# Patient Record
Sex: MALE | Race: WHITE | Employment: FULL TIME | URBAN - METROPOLITAN AREA
[De-identification: names, ages, dates, MRNs, and addresses within clinical notes are randomized per-mention and may not be internally consistent; named-entity substitution may affect disease eponyms.]

---

## 2019-04-23 ENCOUNTER — HOSPITAL ENCOUNTER (EMERGENCY)
Age: 44
Discharge: HOME | End: 2019-04-23

## 2019-04-23 ENCOUNTER — APPOINTMENT (OUTPATIENT)
Dept: GENERAL RADIOLOGY | Age: 44
End: 2019-04-23

## 2019-04-23 VITALS
RESPIRATION RATE: 16 BRPM | HEIGHT: 68 IN | DIASTOLIC BLOOD PRESSURE: 83 MMHG | TEMPERATURE: 99.5 F | SYSTOLIC BLOOD PRESSURE: 125 MMHG | BODY MASS INDEX: 20.18 KG/M2 | OXYGEN SATURATION: 94 % | WEIGHT: 133.13 LBS | HEART RATE: 91 BPM

## 2019-04-23 DIAGNOSIS — J18.9 PNEUMONIA DUE TO ORGANISM: Primary | ICD-10-CM

## 2019-04-23 LAB
ALBUMIN SERPL-MCNC: 4.8 G/DL (ref 3.5–5.2)
ALP BLD-CCNC: 107 U/L (ref 40–129)
ALT SERPL-CCNC: 15 U/L (ref 0–40)
ANION GAP SERPL CALCULATED.3IONS-SCNC: 11 MMOL/L (ref 7–16)
AST SERPL-CCNC: 22 U/L (ref 0–39)
BASOPHILS ABSOLUTE: 0.1 E9/L (ref 0–0.2)
BASOPHILS RELATIVE PERCENT: 0.9 % (ref 0–2)
BILIRUB SERPL-MCNC: 0.4 MG/DL (ref 0–1.2)
BUN BLDV-MCNC: 11 MG/DL (ref 6–20)
CALCIUM SERPL-MCNC: 9.3 MG/DL (ref 8.6–10.2)
CHLORIDE BLD-SCNC: 100 MMOL/L (ref 98–107)
CO2: 28 MMOL/L (ref 22–29)
CREAT SERPL-MCNC: 0.9 MG/DL (ref 0.7–1.2)
EOSINOPHILS ABSOLUTE: 0.1 E9/L (ref 0.05–0.5)
EOSINOPHILS RELATIVE PERCENT: 0.9 % (ref 0–6)
GFR AFRICAN AMERICAN: >60
GFR NON-AFRICAN AMERICAN: >60 ML/MIN/1.73
GLUCOSE BLD-MCNC: 105 MG/DL (ref 74–99)
HCT VFR BLD CALC: 43.3 % (ref 37–54)
HEMOGLOBIN: 14.6 G/DL (ref 12.5–16.5)
LACTIC ACID: 0.8 MMOL/L (ref 0.5–2.2)
LYMPHOCYTES ABSOLUTE: 0.99 E9/L (ref 1.5–4)
LYMPHOCYTES RELATIVE PERCENT: 8.7 % (ref 20–42)
MCH RBC QN AUTO: 31.7 PG (ref 26–35)
MCHC RBC AUTO-ENTMCNC: 33.7 % (ref 32–34.5)
MCV RBC AUTO: 94.1 FL (ref 80–99.9)
MONOCYTES ABSOLUTE: 0.44 E9/L (ref 0.1–0.95)
MONOCYTES RELATIVE PERCENT: 3.5 % (ref 2–12)
NEUTROPHILS ABSOLUTE: 9.46 E9/L (ref 1.8–7.3)
NEUTROPHILS RELATIVE PERCENT: 86.1 % (ref 43–80)
PDW BLD-RTO: 13.2 FL (ref 11.5–15)
PLATELET # BLD: 191 E9/L (ref 130–450)
PMV BLD AUTO: 11.1 FL (ref 7–12)
POTASSIUM SERPL-SCNC: 4.4 MMOL/L (ref 3.5–5)
RBC # BLD: 4.6 E12/L (ref 3.8–5.8)
SODIUM BLD-SCNC: 139 MMOL/L (ref 132–146)
TOTAL PROTEIN: 8.4 G/DL (ref 6.4–8.3)
TROPONIN: <0.01 NG/ML (ref 0–0.03)
WBC # BLD: 11 E9/L (ref 4.5–11.5)

## 2019-04-23 PROCEDURE — 80053 COMPREHEN METABOLIC PANEL: CPT

## 2019-04-23 PROCEDURE — 84484 ASSAY OF TROPONIN QUANT: CPT

## 2019-04-23 PROCEDURE — 96365 THER/PROPH/DIAG IV INF INIT: CPT

## 2019-04-23 PROCEDURE — 99284 EMERGENCY DEPT VISIT MOD MDM: CPT

## 2019-04-23 PROCEDURE — 87040 BLOOD CULTURE FOR BACTERIA: CPT

## 2019-04-23 PROCEDURE — 6370000000 HC RX 637 (ALT 250 FOR IP): Performed by: PHYSICIAN ASSISTANT

## 2019-04-23 PROCEDURE — 83605 ASSAY OF LACTIC ACID: CPT

## 2019-04-23 PROCEDURE — 2580000003 HC RX 258: Performed by: PHYSICIAN ASSISTANT

## 2019-04-23 PROCEDURE — 36415 COLL VENOUS BLD VENIPUNCTURE: CPT

## 2019-04-23 PROCEDURE — 71045 X-RAY EXAM CHEST 1 VIEW: CPT

## 2019-04-23 PROCEDURE — 85025 COMPLETE CBC W/AUTO DIFF WBC: CPT

## 2019-04-23 PROCEDURE — 96366 THER/PROPH/DIAG IV INF ADDON: CPT

## 2019-04-23 PROCEDURE — 6360000002 HC RX W HCPCS: Performed by: PHYSICIAN ASSISTANT

## 2019-04-23 PROCEDURE — 93005 ELECTROCARDIOGRAM TRACING: CPT

## 2019-04-23 PROCEDURE — 96375 TX/PRO/DX INJ NEW DRUG ADDON: CPT

## 2019-04-23 RX ORDER — BENZONATATE 100 MG/1
100 CAPSULE ORAL 3 TIMES DAILY PRN
Qty: 21 CAPSULE | Refills: 0 | Status: SHIPPED | OUTPATIENT
Start: 2019-04-23 | End: 2019-04-30

## 2019-04-23 RX ORDER — CEPHALEXIN 500 MG/1
500 CAPSULE ORAL 3 TIMES DAILY
Qty: 21 CAPSULE | Refills: 0 | Status: SHIPPED | OUTPATIENT
Start: 2019-04-23 | End: 2019-04-30

## 2019-04-23 RX ORDER — 0.9 % SODIUM CHLORIDE 0.9 %
2000 INTRAVENOUS SOLUTION INTRAVENOUS ONCE
Status: COMPLETED | OUTPATIENT
Start: 2019-04-23 | End: 2019-04-23

## 2019-04-23 RX ORDER — ACETAMINOPHEN 500 MG
1000 TABLET ORAL EVERY 6 HOURS PRN
Qty: 120 TABLET | Refills: 3 | Status: SHIPPED | OUTPATIENT
Start: 2019-04-23 | End: 2019-04-30

## 2019-04-23 RX ORDER — IBUPROFEN 800 MG/1
800 TABLET ORAL EVERY 8 HOURS PRN
Qty: 21 TABLET | Refills: 3 | Status: SHIPPED | OUTPATIENT
Start: 2019-04-23 | End: 2019-04-30

## 2019-04-23 RX ORDER — GUAIFENESIN AND DEXTROMETHORPHAN HYDROBROMIDE 1200; 60 MG/1; MG/1
1 TABLET, EXTENDED RELEASE ORAL EVERY 12 HOURS PRN
Qty: 28 TABLET | Refills: 0 | Status: SHIPPED | OUTPATIENT
Start: 2019-04-23 | End: 2020-05-28

## 2019-04-23 RX ORDER — AZITHROMYCIN 500 MG/1
500 TABLET, FILM COATED ORAL DAILY
Qty: 7 TABLET | Refills: 0 | Status: SHIPPED | OUTPATIENT
Start: 2019-04-23 | End: 2019-04-30

## 2019-04-23 RX ORDER — ACETAMINOPHEN 500 MG
1000 TABLET ORAL ONCE
Status: COMPLETED | OUTPATIENT
Start: 2019-04-23 | End: 2019-04-23

## 2019-04-23 RX ADMIN — ACETAMINOPHEN 1000 MG: 500 TABLET, FILM COATED ORAL at 20:24

## 2019-04-23 RX ADMIN — WATER 1 G: 1 INJECTION INTRAMUSCULAR; INTRAVENOUS; SUBCUTANEOUS at 20:22

## 2019-04-23 RX ADMIN — SODIUM CHLORIDE 2000 ML: 9 INJECTION, SOLUTION INTRAVENOUS at 20:20

## 2019-04-23 RX ADMIN — AZITHROMYCIN MONOHYDRATE 500 MG: 500 INJECTION, POWDER, LYOPHILIZED, FOR SOLUTION INTRAVENOUS at 20:27

## 2019-04-23 ASSESSMENT — PAIN - FUNCTIONAL ASSESSMENT: PAIN_FUNCTIONAL_ASSESSMENT: PREVENTS OR INTERFERES SOME ACTIVE ACTIVITIES AND ADLS

## 2019-04-23 ASSESSMENT — PAIN DESCRIPTION - PAIN TYPE: TYPE: ACUTE PAIN

## 2019-04-23 ASSESSMENT — PAIN DESCRIPTION - ORIENTATION: ORIENTATION: RIGHT

## 2019-04-23 ASSESSMENT — PAIN DESCRIPTION - LOCATION: LOCATION: CHEST;RIB CAGE

## 2019-04-23 ASSESSMENT — PAIN SCALES - GENERAL
PAINLEVEL_OUTOF10: 6
PAINLEVEL_OUTOF10: 5
PAINLEVEL_OUTOF10: 10

## 2019-04-23 ASSESSMENT — PAIN DESCRIPTION - FREQUENCY: FREQUENCY: INTERMITTENT

## 2019-04-23 ASSESSMENT — PAIN DESCRIPTION - DESCRIPTORS: DESCRIPTORS: DISCOMFORT

## 2019-04-23 NOTE — ED PROVIDER NOTES
[facility]     Department of Emergency Medicine   ED  Provider Note  Admit Date/RoomTime: 4/23/2019  7:17 PM  ED Room: 24/24   Chief Complaint   Cough (cough sore throat diff breathing today pain rt chest area)    History of Present Illness   Source of history provided by:  patient. History/Exam Limitations: none. Dante Ryan is a 38 y.o. old male who has a past medical history of: History reviewed. No pertinent past medical history. presents to the emergency department by private vehicle, with complaints of gradual onset, still present, constant right sided pleuritic chest pain which began 2 hour(s) prior to arrival.  The symptoms are associated with chills, fatigue, night sweats and cough all of which started yesterday. He also c/o SOB due to not being alexander to take a deep breath in. He denies abdominal pain, calf pain, dizziness, leg swelling, palpitations or syncope. He has prior history of no history of pneumonia or bronchitis in the past.  Since onset the symptoms have been persistent and worsening and moderate to severe in severity. The symptoms are aggravated by deep inspiration and relieved by nothing. PERC Rule for PE:      Age ? 50 negative     HR ? 100 positive     O2 Sat on Room Air < 95% negative     Prior History of DVT/PE negative     Recent Trauma or Surgery negative     Hemoptysis negative     Exogenous Estrogen/Hormone Use negative     Unilateral Extgremity Swelling negative     * If ANY Criteria are positive, the PERC rule is not satisfied and cannot be used to rule out PE in this patient.     Wells' Criteria for PE (possible score 0 to 12.5)       Clinical Signs & Symptoms of DVT   No      PE is #1 Dx or Equally Likely   No      Heart Rate >100   Yes (+1.5)      Immobillization at least 3 days or     Surgery in past 4 Weeks   No      Previous Diagnosed PE or DVT   No      Hemoptysis   No      Malignancy w/Tx in Past 6 Months or     Palliative Tx   No      TOTAL SCORE 1.5     Low Risk Group: 0-1.5 =  1.3-3 % incidence of PE  Mod Risk Group: 2-3.5 =  16.2 % Incidence of PE  Mod Risk Group: > 4 = 28% Incidence of PE  High Risk Group >5 = 40.6 Incidence of    ROS   Pertinent positives and negatives are stated within HPI, all other systems reviewed and are negative. History reviewed. No pertinent surgical history. Social History:  reports that he has been smoking. He has been smoking about 1.00 pack per day. He has never used smokeless tobacco. He reports that he has current or past drug history. Drugs: Opiates  and IV. He reports that he does not drink alcohol. Family History: family history is not on file. Allergies: Patient has no known allergies. Physical Exam           ED Triage Vitals [04/23/19 1914]   BP Temp Temp Source Pulse Resp SpO2 Height Weight   132/74 101.2 °F (38.4 °C) Oral 104 16 95 % 5' 8\" (1.727 m) 133 lb 2 oz (60.4 kg)      Oxygen Saturation Interpretation: Normal.    Constitutional:  Alert, development consistent with age. HEENT:  NC/NT. Airway patent. Neck:  Normal ROM. Supple. Respiratory:  Breath sounds: equal bilaterally. Lung sounds: rales- 1/4 of the way up on the right. CV:  Regular rate and rhythm, normal heart sounds, without pathological murmurs, ectopy, gallops, or rubs. .  GI:  Abdomen Soft, nontender, good bowel sounds. No firm or pulsatile mass. Integument:  Normal turgor. Warm, dry, without visible rash. Lymphatic:  Edema:  none Bilateral lower extremity(s). Neurological:  Oriented. Motor functions intact.     Lab / Imaging Results   (All laboratory and radiology results have been personally reviewed by myself)  Labs:  Results for orders placed or performed during the hospital encounter of 04/23/19   CBC Auto Differential   Result Value Ref Range    WBC 11.0 4.5 - 11.5 E9/L    RBC 4.60 3.80 - 5.80 E12/L    Hemoglobin 14.6 12.5 - 16.5 g/dL    Hematocrit 43.3 37.0 - 54.0 %    MCV 94.1 80.0 - 99.9 fL    MCH 31.7 26.0 - 35.0 pg    MCHC 33.7 32.0 - 34.5 %    RDW 13.2 11.5 - 15.0 fL    Platelets 205 164 - 297 E9/L    MPV 11.1 7.0 - 12.0 fL    Neutrophils % 86.1 (H) 43.0 - 80.0 %    Lymphocytes % 8.7 (L) 20.0 - 42.0 %    Monocytes % 3.5 2.0 - 12.0 %    Eosinophils % 0.9 0.0 - 6.0 %    Basophils % 0.9 0.0 - 2.0 %    Neutrophils # 9.46 (H) 1.80 - 7.30 E9/L    Lymphocytes # 0.99 (L) 1.50 - 4.00 E9/L    Monocytes # 0.44 0.10 - 0.95 E9/L    Eosinophils # 0.10 0.05 - 0.50 E9/L    Basophils # 0.10 0.00 - 0.20 E9/L   Comprehensive Metabolic Panel   Result Value Ref Range    Sodium 139 132 - 146 mmol/L    Potassium 4.4 3.5 - 5.0 mmol/L    Chloride 100 98 - 107 mmol/L    CO2 28 22 - 29 mmol/L    Anion Gap 11 7 - 16 mmol/L    Glucose 105 (H) 74 - 99 mg/dL    BUN 11 6 - 20 mg/dL    CREATININE 0.9 0.7 - 1.2 mg/dL    GFR Non-African American >60 >=60 mL/min/1.73    GFR African American >60     Calcium 9.3 8.6 - 10.2 mg/dL    Total Protein 8.4 (H) 6.4 - 8.3 g/dL    Alb 4.8 3.5 - 5.2 g/dL    Total Bilirubin 0.4 0.0 - 1.2 mg/dL    Alkaline Phosphatase 107 40 - 129 U/L    ALT 15 0 - 40 U/L    AST 22 0 - 39 U/L   Troponin   Result Value Ref Range    Troponin <0.01 0.00 - 0.03 ng/mL   Lactic Acid, Plasma   Result Value Ref Range    Lactic Acid 0.8 0.5 - 2.2 mmol/L   EKG 12 Lead   Result Value Ref Range    Ventricular Rate 93 BPM    Atrial Rate 93 BPM    P-R Interval 142 ms    QRS Duration 72 ms    Q-T Interval 328 ms    QTc Calculation (Bazett) 407 ms    P Axis 50 degrees    R Axis 5 degrees    T Axis 18 degrees     Imaging: All Radiology results interpreted by Radiologist unless otherwise noted. XR CHEST PORTABLE   Final Result   1. Right basilar airspace disease is likely infectious.            ED Course / Medical Decision Making     Medications   azithromycin (ZITHROMAX) 500 mg in D5W 250ml Vial Mate (500 mg Intravenous New Bag 4/23/19 2027)   acetaminophen (TYLENOL) tablet 1,000 mg (1,000 mg Oral Given 4/23/19 2024)   0.9 % sodium chloride bolus (2,000 BENZONATATE (TESSALON PERLES) 100 MG CAPSULE    Take 1 capsule by mouth 3 times daily as needed for Cough    CEPHALEXIN (KEFLEX) 500 MG CAPSULE    Take 1 capsule by mouth 3 times daily for 7 days    DEXTROMETHORPHAN-GUAIFENESIN (MUCINEX DM MAXIMUM STRENGTH)  MG TB12    Take 1 tablet by mouth every 12 hours as needed (cough and congestion)    IBUPROFEN (ADVIL;MOTRIN) 800 MG TABLET    Take 1 tablet by mouth every 8 hours as needed for Pain or Fever     Electronically signed by Robert Lucia PA-C   DD: 4/23/19  **This report was transcribed using voice recognition software. Every effort was made to ensure accuracy; however, inadvertent computerized transcription errors may be present.   END OF ED PROVIDER NOTE        Robert Lucia PA-C  04/23/19 [id number]

## 2019-04-23 NOTE — ED NOTES
Pt. C/o cough and sore throat since yesterday. Pt. C/o chest pain and difficulty breathing today.      Tomi Stark RN  04/23/19 2050

## 2019-04-27 LAB
EKG ATRIAL RATE: 93 BPM
EKG P AXIS: 50 DEGREES
EKG P-R INTERVAL: 142 MS
EKG Q-T INTERVAL: 328 MS
EKG QRS DURATION: 72 MS
EKG QTC CALCULATION (BAZETT): 407 MS
EKG R AXIS: 5 DEGREES
EKG T AXIS: 18 DEGREES
EKG VENTRICULAR RATE: 93 BPM

## 2019-04-29 LAB
BLOOD CULTURE, ROUTINE: NORMAL
CULTURE, BLOOD 2: NORMAL

## 2020-04-09 ENCOUNTER — HOSPITAL ENCOUNTER (EMERGENCY)
Age: 45
Discharge: HOME | End: 2020-04-09
Attending: EMERGENCY MEDICINE
Payer: MEDICAID

## 2020-04-09 VITALS
BODY MASS INDEX: 20.16 KG/M2 | SYSTOLIC BLOOD PRESSURE: 119 MMHG | HEIGHT: 68 IN | TEMPERATURE: 97.4 F | DIASTOLIC BLOOD PRESSURE: 81 MMHG | RESPIRATION RATE: 15 BRPM | OXYGEN SATURATION: 96 % | HEART RATE: 90 BPM | WEIGHT: 133 LBS

## 2020-04-09 PROCEDURE — 99284 EMERGENCY DEPT VISIT MOD MDM: CPT

## 2020-04-09 ASSESSMENT — PAIN SCALES - GENERAL: PAINLEVEL_OUTOF10: 10

## 2020-04-09 ASSESSMENT — PAIN DESCRIPTION - LOCATION: LOCATION: ARM

## 2020-04-09 ASSESSMENT — PAIN DESCRIPTION - ORIENTATION: ORIENTATION: RIGHT

## 2020-04-09 ASSESSMENT — PAIN DESCRIPTION - PAIN TYPE: TYPE: ACUTE PAIN

## 2020-04-09 NOTE — CARE COORDINATION
Pt presents to the ED due to a drug overdose. He is a police hold. Bear Barnes from [facility] will meet with pt to provide emotional support and substance abuse resources.     Electronically signed by CARLOS Alvarado, GREGGW on 4/9/2020 at 1:36 PM

## 2020-04-09 NOTE — ED NOTES
Patient verbalized understanding of discharge instructions and will follow up with PCP/peer recovery support as needed or return to ED if symptoms worsen     Savannah Lozano RN  04/09/20 [id number]

## 2020-04-09 NOTE — ED PROVIDER NOTES
and/or participated in the history, exam, medical decision making, and procedures and agree with all pertinent clinical information unless otherwise noted. I have also reviewed and agree with the past medical, family and social history unless otherwise noted. I have discussed this patient in detail with the resident and provided the instruction and education regarding the evidence-based evaluation and treatment of [unfilled]  History: patient unintentionally overdosed on heroin. He admits to mild SOB and nausea. He denies SI/HI. My findings: Isela Wilcox is a 54 y.o. male whom is in no distress. Physical exam reveals well appearing. Heart RRR, lungs CTA, abdomen is soft and nontender. No peripheral edema. Ill kempt. My plan: Symptomatic and supportive care. Patient given narcan in route. Will continue to monitor while in the ED. Electronically signed by [id number] DO Maria C on 4/9/20 at 1:52 PM EDT        [JS]   [phone] Patient reassessed. He is resting comfortably in bed and states that he feels no drowsiness or excessive sleepiness. He has maintained an adequate SpO2 since arrival and he has not required any further doses of Narcan. He complains of some persistent pain in his right lateral upper arm and wonders if when he overdosed if he was laying on his right arm. He is completely able to move the right arm and has no erythema or edema. Patient is in no distress, again denies suicidal or homicidal ideation, and is stable for discharge. He is a police hold and they will be contacted. [BM]      ED Course User Index  [BM] Harrison Gates DO  [JS] [address] DO Maria C         Medical Decision Making:    Patient presents for accidental overdose on heroin. Denies SI/HI and denies any other drug or alcohol abuse. He is alert and oriented x 3 at time of exam and is protecting his airway.  He will be kept on telemetry monitoring for at least an hour to ensure no rebound narcosis. Counseling: The emergency provider has spoken with the patient and discussed todays results, in addition to providing specific details for the plan of care and counseling regarding the diagnosis and prognosis. Questions are answered at this time and they are agreeable with the plan.      --------------------------------- IMPRESSION AND DISPOSITION ---------------------------------    IMPRESSION  1.  Accidental overdose of heroin, initial encounter [facility])        DISPOSITION  Disposition: Discharge to [facility]  Patient condition is stable                 Marilyn Levine DO  04/09/20 1441

## 2020-05-28 ENCOUNTER — HOSPITAL ENCOUNTER (EMERGENCY)
Age: 45
Discharge: HOME | End: 2020-05-28
Payer: MEDICAID

## 2020-05-28 VITALS
TEMPERATURE: 97.7 F | DIASTOLIC BLOOD PRESSURE: 85 MMHG | OXYGEN SATURATION: 99 % | HEART RATE: 99 BPM | RESPIRATION RATE: 18 BRPM | SYSTOLIC BLOOD PRESSURE: 130 MMHG

## 2020-05-28 PROCEDURE — 12002 RPR S/N/AX/GEN/TRNK2.6-7.5CM: CPT

## 2020-05-28 PROCEDURE — 90471 IMMUNIZATION ADMIN: CPT | Performed by: STUDENT IN AN ORGANIZED HEALTH CARE EDUCATION/TRAINING PROGRAM

## 2020-05-28 PROCEDURE — 6360000002 HC RX W HCPCS: Performed by: STUDENT IN AN ORGANIZED HEALTH CARE EDUCATION/TRAINING PROGRAM

## 2020-05-28 PROCEDURE — 90715 TDAP VACCINE 7 YRS/> IM: CPT | Performed by: STUDENT IN AN ORGANIZED HEALTH CARE EDUCATION/TRAINING PROGRAM

## 2020-05-28 PROCEDURE — 99282 EMERGENCY DEPT VISIT SF MDM: CPT

## 2020-05-28 RX ADMIN — TETANUS TOXOID, REDUCED DIPHTHERIA TOXOID AND ACELLULAR PERTUSSIS VACCINE, ADSORBED 0.5 ML: 5; 2.5; 8; 8; 2.5 SUSPENSION INTRAMUSCULAR at 21:55

## 2020-05-28 ASSESSMENT — ENCOUNTER SYMPTOMS
VOMITING: 0
ABDOMINAL PAIN: 0
PHOTOPHOBIA: 0
CHEST TIGHTNESS: 0
SHORTNESS OF BREATH: 0
NAUSEA: 0

## 2020-05-28 ASSESSMENT — PAIN DESCRIPTION - DESCRIPTORS: DESCRIPTORS: ACHING

## 2020-05-28 ASSESSMENT — PAIN DESCRIPTION - ORIENTATION: ORIENTATION: LEFT

## 2020-05-28 ASSESSMENT — PAIN SCALES - GENERAL: PAINLEVEL_OUTOF10: 6

## 2020-05-28 ASSESSMENT — PAIN DESCRIPTION - LOCATION: LOCATION: ARM

## 2020-05-28 ASSESSMENT — PAIN DESCRIPTION - FREQUENCY: FREQUENCY: CONTINUOUS

## 2020-05-28 ASSESSMENT — PAIN DESCRIPTION - PROGRESSION: CLINICAL_PROGRESSION: NOT CHANGED

## 2020-05-28 ASSESSMENT — PAIN DESCRIPTION - PAIN TYPE: TYPE: ACUTE PAIN

## 2020-05-29 NOTE — ED PROVIDER NOTES
patient tolerated the procedure well. Complications: None    MDM  Number of Diagnoses or Management Options  Laceration of right forearm, initial encounter:   Diagnosis management comments: Patient is a 79-year-old male who presents to the emergency department for evaluation of laceration. Patient resting comfortably and in no apparent distress on exam.  3.5 cm laceration to the right midforearm with a small amount of venous and slightly pulsatile bleeding in the superficial edge of the wound. Pulsatile bleeding was controlled with one deep stitch. Wound was closed as described in procedure note above. Tetanus was updated. Symptoms for return to the emergency room were discussed with patient. Discharged home in stable condition.              --------------------------------------------- PAST HISTORY ---------------------------------------------  Past Medical History:  has no past medical history on file. Past Surgical History:  has no past surgical history on file. Social History:  reports that he has been smoking. He has been smoking about 1.00 pack per day. He has never used smokeless tobacco. He reports current drug use. Drugs: Opiates  and IV. He reports that he does not drink alcohol. Family History: family history is not on file. The patients home medications have been reviewed. Allergies: Patient has no known allergies. -------------------------------------------------- RESULTS -------------------------------------------------  Labs:  No results found for this visit on 05/28/20. Radiology:  No orders to display       ------------------------- NURSING NOTES AND VITALS REVIEWED ---------------------------  Date / Time Roomed:  5/28/2020  9:37 PM  ED Bed Assignment:  18/18    The nursing notes within the ED encounter and vital signs as below have been reviewed.    /85   Pulse 99   Temp 97.7 °F (36.5 °C) (Oral)   Resp 18   SpO2 99%   Oxygen Saturation Interpretation:

## 2020-06-25 ENCOUNTER — HOSPITAL ENCOUNTER (INPATIENT)
Age: 45
LOS: 8 days | Discharge: HOME | DRG: 469 | End: 2020-07-03
Attending: EMERGENCY MEDICINE | Admitting: INTERNAL MEDICINE
Payer: MEDICAID

## 2020-06-25 ENCOUNTER — APPOINTMENT (OUTPATIENT)
Dept: INTERVENTIONAL RADIOLOGY/VASCULAR | Age: 45
DRG: 469 | End: 2020-06-25
Payer: MEDICAID

## 2020-06-25 ENCOUNTER — APPOINTMENT (OUTPATIENT)
Dept: CT IMAGING | Age: 45
DRG: 469 | End: 2020-06-25
Payer: MEDICAID

## 2020-06-25 PROBLEM — F19.91 HISTORY OF DRUG USE: Status: ACTIVE | Noted: 2020-06-25

## 2020-06-25 PROBLEM — M62.82 NON-TRAUMATIC RHABDOMYOLYSIS: Status: ACTIVE | Noted: 2020-06-25

## 2020-06-25 PROBLEM — E87.5 HYPERKALEMIA: Status: ACTIVE | Noted: 2020-06-25

## 2020-06-25 PROBLEM — N17.9 ACUTE RENAL FAILURE (ARF) (HCC): Status: ACTIVE | Noted: 2020-06-25

## 2020-06-25 LAB
AMPHETAMINE SCREEN, URINE: NOT DETECTED
ANION GAP SERPL CALCULATED.3IONS-SCNC: 20 MMOL/L (ref 7–16)
ANION GAP SERPL CALCULATED.3IONS-SCNC: 20 MMOL/L (ref 7–16)
BACTERIA: NORMAL /HPF
BARBITURATE SCREEN URINE: NOT DETECTED
BASOPHILS ABSOLUTE: 0.02 E9/L (ref 0–0.2)
BASOPHILS RELATIVE PERCENT: 0.2 % (ref 0–2)
BENZODIAZEPINE SCREEN, URINE: NOT DETECTED
BILIRUBIN URINE: NEGATIVE
BLOOD, URINE: ABNORMAL
BUN BLDV-MCNC: 141 MG/DL (ref 6–20)
BUN BLDV-MCNC: 143 MG/DL (ref 6–20)
C-REACTIVE PROTEIN: 0.9 MG/DL (ref 0–0.4)
CALCIUM SERPL-MCNC: 8.7 MG/DL (ref 8.6–10.2)
CALCIUM SERPL-MCNC: 8.9 MG/DL (ref 8.6–10.2)
CANNABINOID SCREEN URINE: NOT DETECTED
CHLORIDE BLD-SCNC: 93 MMOL/L (ref 98–107)
CHLORIDE BLD-SCNC: 96 MMOL/L (ref 98–107)
CHLORIDE URINE RANDOM: 52 MMOL/L
CHP ED QC CHECK: YES
CLARITY: CLEAR
CO2: 18 MMOL/L (ref 22–29)
CO2: 18 MMOL/L (ref 22–29)
COCAINE METABOLITE SCREEN URINE: POSITIVE
COLOR: YELLOW
CREAT SERPL-MCNC: 10.8 MG/DL (ref 0.7–1.2)
CREAT SERPL-MCNC: 10.8 MG/DL (ref 0.7–1.2)
CREATININE URINE: 61 MG/DL (ref 40–278)
EOSINOPHIL, URINE: 0 % (ref 0–1)
EOSINOPHILS ABSOLUTE: 0.09 E9/L (ref 0.05–0.5)
EOSINOPHILS RELATIVE PERCENT: 1.1 % (ref 0–6)
FENTANYL SCREEN, URINE: POSITIVE
GFR AFRICAN AMERICAN: 6
GFR AFRICAN AMERICAN: 6
GFR NON-AFRICAN AMERICAN: 5 ML/MIN/1.73
GFR NON-AFRICAN AMERICAN: 5 ML/MIN/1.73
GLUCOSE BLD-MCNC: 100 MG/DL
GLUCOSE BLD-MCNC: 104 MG/DL (ref 74–99)
GLUCOSE BLD-MCNC: 109 MG/DL (ref 74–99)
GLUCOSE URINE: 100 MG/DL
HCT VFR BLD CALC: 42.6 % (ref 37–54)
HEMOGLOBIN: 14.9 G/DL (ref 12.5–16.5)
IMMATURE GRANULOCYTES #: 0.08 E9/L
IMMATURE GRANULOCYTES %: 0.9 % (ref 0–5)
INR BLD: 0.9
KETONES, URINE: NEGATIVE MG/DL
LEUKOCYTE ESTERASE, URINE: NEGATIVE
LYMPHOCYTES ABSOLUTE: 0.73 E9/L (ref 1.5–4)
LYMPHOCYTES RELATIVE PERCENT: 8.6 % (ref 20–42)
Lab: ABNORMAL
MCH RBC QN AUTO: 34.3 PG (ref 26–35)
MCHC RBC AUTO-ENTMCNC: 35 % (ref 32–34.5)
MCV RBC AUTO: 97.9 FL (ref 80–99.9)
METER GLUCOSE: 100 MG/DL (ref 74–99)
METER GLUCOSE: 107 MG/DL (ref 74–99)
METHADONE SCREEN, URINE: NOT DETECTED
MONOCYTES ABSOLUTE: 0.86 E9/L (ref 0.1–0.95)
MONOCYTES RELATIVE PERCENT: 10.1 % (ref 2–12)
NEUTROPHILS ABSOLUTE: 6.73 E9/L (ref 1.8–7.3)
NEUTROPHILS RELATIVE PERCENT: 79.1 % (ref 43–80)
NITRITE, URINE: NEGATIVE
OPIATE SCREEN URINE: NOT DETECTED
OSMOLALITY URINE: 342 MOSM/KG (ref 300–900)
OXYCODONE URINE: NOT DETECTED
PDW BLD-RTO: 13.1 FL (ref 11.5–15)
PH UA: 5.5 (ref 5–9)
PHENCYCLIDINE SCREEN URINE: NOT DETECTED
PLATELET # BLD: 127 E9/L (ref 130–450)
PMV BLD AUTO: 11.4 FL (ref 7–12)
POTASSIUM SERPL-SCNC: 7.1 MMOL/L (ref 3.5–5)
POTASSIUM SERPL-SCNC: 7.4 MMOL/L (ref 3.5–5)
PROTEIN PROTEIN: 20 MG/DL (ref 0–12)
PROTEIN UA: NEGATIVE MG/DL
PROTHROMBIN TIME: 10.4 SEC (ref 9.3–12.4)
RBC # BLD: 4.35 E12/L (ref 3.8–5.8)
RBC UA: NORMAL /HPF (ref 0–2)
SEDIMENTATION RATE, ERYTHROCYTE: 20 MM/HR (ref 0–15)
SODIUM BLD-SCNC: 131 MMOL/L (ref 132–146)
SODIUM BLD-SCNC: 134 MMOL/L (ref 132–146)
SODIUM URINE: 72 MMOL/L
SPECIFIC GRAVITY UA: 1.01 (ref 1–1.03)
TOTAL CK: ABNORMAL U/L (ref 20–200)
UROBILINOGEN, URINE: 0.2 E.U./DL
WBC # BLD: 8.5 E9/L (ref 4.5–11.5)
WBC UA: NORMAL /HPF (ref 0–5)

## 2020-06-25 PROCEDURE — 94640 AIRWAY INHALATION TREATMENT: CPT

## 2020-06-25 PROCEDURE — 02HV33Z INSERTION OF INFUSION DEVICE INTO SUPERIOR VENA CAVA, PERCUTANEOUS APPROACH: ICD-10-PCS | Performed by: INTERNAL MEDICINE

## 2020-06-25 PROCEDURE — 76937 US GUIDE VASCULAR ACCESS: CPT

## 2020-06-25 PROCEDURE — 85025 COMPLETE CBC W/AUTO DIFF WBC: CPT

## 2020-06-25 PROCEDURE — 84156 ASSAY OF PROTEIN URINE: CPT

## 2020-06-25 PROCEDURE — 84100 ASSAY OF PHOSPHORUS: CPT

## 2020-06-25 PROCEDURE — 77001 FLUOROGUIDE FOR VEIN DEVICE: CPT

## 2020-06-25 PROCEDURE — 83735 ASSAY OF MAGNESIUM: CPT

## 2020-06-25 PROCEDURE — 6360000002 HC RX W HCPCS: Performed by: INTERNAL MEDICINE

## 2020-06-25 PROCEDURE — 87040 BLOOD CULTURE FOR BACTERIA: CPT

## 2020-06-25 PROCEDURE — 2000000000 HC ICU R&B

## 2020-06-25 PROCEDURE — 82570 ASSAY OF URINE CREATININE: CPT

## 2020-06-25 PROCEDURE — 96375 TX/PRO/DX INJ NEW DRUG ADDON: CPT

## 2020-06-25 PROCEDURE — 87205 SMEAR GRAM STAIN: CPT

## 2020-06-25 PROCEDURE — 2500000003 HC RX 250 WO HCPCS: Performed by: EMERGENCY MEDICINE

## 2020-06-25 PROCEDURE — 6360000002 HC RX W HCPCS: Performed by: EMERGENCY MEDICINE

## 2020-06-25 PROCEDURE — 36415 COLL VENOUS BLD VENIPUNCTURE: CPT

## 2020-06-25 PROCEDURE — C1769 GUIDE WIRE: HCPCS

## 2020-06-25 PROCEDURE — 99223 1ST HOSP IP/OBS HIGH 75: CPT | Performed by: INTERNAL MEDICINE

## 2020-06-25 PROCEDURE — 82436 ASSAY OF URINE CHLORIDE: CPT

## 2020-06-25 PROCEDURE — 96374 THER/PROPH/DIAG INJ IV PUSH: CPT

## 2020-06-25 PROCEDURE — 84300 ASSAY OF URINE SODIUM: CPT

## 2020-06-25 PROCEDURE — 2580000003 HC RX 258: Performed by: EMERGENCY MEDICINE

## 2020-06-25 PROCEDURE — 86225 DNA ANTIBODY NATIVE: CPT

## 2020-06-25 PROCEDURE — 6370000000 HC RX 637 (ALT 250 FOR IP): Performed by: INTERNAL MEDICINE

## 2020-06-25 PROCEDURE — 93005 ELECTROCARDIOGRAM TRACING: CPT | Performed by: INTERNAL MEDICINE

## 2020-06-25 PROCEDURE — 83874 ASSAY OF MYOGLOBIN: CPT

## 2020-06-25 PROCEDURE — 99285 EMERGENCY DEPT VISIT HI MDM: CPT

## 2020-06-25 PROCEDURE — 82962 GLUCOSE BLOOD TEST: CPT

## 2020-06-25 PROCEDURE — 5A1D70Z PERFORMANCE OF URINARY FILTRATION, INTERMITTENT, LESS THAN 6 HOURS PER DAY: ICD-10-PCS | Performed by: INTERNAL MEDICINE

## 2020-06-25 PROCEDURE — 85651 RBC SED RATE NONAUTOMATED: CPT

## 2020-06-25 PROCEDURE — 94644 CONT INHLJ TX 1ST HOUR: CPT

## 2020-06-25 PROCEDURE — 2580000003 HC RX 258: Performed by: INTERNAL MEDICINE

## 2020-06-25 PROCEDURE — 83605 ASSAY OF LACTIC ACID: CPT

## 2020-06-25 PROCEDURE — 80048 BASIC METABOLIC PNL TOTAL CA: CPT

## 2020-06-25 PROCEDURE — B5181ZZ FLUOROSCOPY OF SUPERIOR VENA CAVA USING LOW OSMOLAR CONTRAST: ICD-10-PCS | Performed by: INTERNAL MEDICINE

## 2020-06-25 PROCEDURE — 80307 DRUG TEST PRSMV CHEM ANLYZR: CPT

## 2020-06-25 PROCEDURE — 83935 ASSAY OF URINE OSMOLALITY: CPT

## 2020-06-25 PROCEDURE — 81001 URINALYSIS AUTO W/SCOPE: CPT

## 2020-06-25 PROCEDURE — 86140 C-REACTIVE PROTEIN: CPT

## 2020-06-25 PROCEDURE — 85610 PROTHROMBIN TIME: CPT

## 2020-06-25 PROCEDURE — 36556 INSERT NON-TUNNEL CV CATH: CPT

## 2020-06-25 PROCEDURE — 82010 KETONE BODYS QUAN: CPT

## 2020-06-25 PROCEDURE — 72192 CT PELVIS W/O DYE: CPT

## 2020-06-25 PROCEDURE — 90935 HEMODIALYSIS ONE EVALUATION: CPT

## 2020-06-25 PROCEDURE — 82550 ASSAY OF CK (CPK): CPT

## 2020-06-25 PROCEDURE — 6370000000 HC RX 637 (ALT 250 FOR IP): Performed by: EMERGENCY MEDICINE

## 2020-06-25 RX ORDER — SODIUM CHLORIDE 0.9 % (FLUSH) 0.9 %
10 SYRINGE (ML) INJECTION EVERY 12 HOURS SCHEDULED
Status: DISCONTINUED | OUTPATIENT
Start: 2020-06-25 | End: 2020-07-03 | Stop reason: HOSPADM

## 2020-06-25 RX ORDER — DEXTROSE MONOHYDRATE 25 G/50ML
12.5 INJECTION, SOLUTION INTRAVENOUS PRN
Status: DISCONTINUED | OUTPATIENT
Start: 2020-06-25 | End: 2020-07-03 | Stop reason: HOSPADM

## 2020-06-25 RX ORDER — POLYETHYLENE GLYCOL 3350 17 G/17G
17 POWDER, FOR SOLUTION ORAL DAILY PRN
Status: DISCONTINUED | OUTPATIENT
Start: 2020-06-25 | End: 2020-07-03 | Stop reason: HOSPADM

## 2020-06-25 RX ORDER — CALCIUM GLUCONATE 94 MG/ML
1 INJECTION, SOLUTION INTRAVENOUS ONCE
Status: DISCONTINUED | OUTPATIENT
Start: 2020-06-25 | End: 2020-06-25 | Stop reason: CLARIF

## 2020-06-25 RX ORDER — NICOTINE POLACRILEX 4 MG
15 LOZENGE BUCCAL PRN
Status: DISCONTINUED | OUTPATIENT
Start: 2020-06-25 | End: 2020-07-03 | Stop reason: HOSPADM

## 2020-06-25 RX ORDER — PROMETHAZINE HYDROCHLORIDE 25 MG/1
12.5 TABLET ORAL EVERY 6 HOURS PRN
Status: DISCONTINUED | OUTPATIENT
Start: 2020-06-25 | End: 2020-07-03 | Stop reason: HOSPADM

## 2020-06-25 RX ORDER — ACETAMINOPHEN 325 MG/1
650 TABLET ORAL EVERY 6 HOURS PRN
Status: DISCONTINUED | OUTPATIENT
Start: 2020-06-25 | End: 2020-07-03 | Stop reason: HOSPADM

## 2020-06-25 RX ORDER — SODIUM CHLORIDE 0.9 % (FLUSH) 0.9 %
10 SYRINGE (ML) INJECTION PRN
Status: DISCONTINUED | OUTPATIENT
Start: 2020-06-25 | End: 2020-07-03 | Stop reason: HOSPADM

## 2020-06-25 RX ORDER — ACETAMINOPHEN 650 MG/1
650 SUPPOSITORY RECTAL EVERY 6 HOURS PRN
Status: DISCONTINUED | OUTPATIENT
Start: 2020-06-25 | End: 2020-07-03 | Stop reason: HOSPADM

## 2020-06-25 RX ORDER — ONDANSETRON 2 MG/ML
4 INJECTION INTRAMUSCULAR; INTRAVENOUS EVERY 6 HOURS PRN
Status: DISCONTINUED | OUTPATIENT
Start: 2020-06-25 | End: 2020-07-03 | Stop reason: HOSPADM

## 2020-06-25 RX ORDER — DEXTROSE MONOHYDRATE 50 MG/ML
100 INJECTION, SOLUTION INTRAVENOUS PRN
Status: DISCONTINUED | OUTPATIENT
Start: 2020-06-25 | End: 2020-07-03 | Stop reason: HOSPADM

## 2020-06-25 RX ORDER — DEXTROSE MONOHYDRATE 25 G/50ML
25 INJECTION, SOLUTION INTRAVENOUS ONCE
Status: COMPLETED | OUTPATIENT
Start: 2020-06-25 | End: 2020-06-25

## 2020-06-25 RX ORDER — HEPARIN SODIUM 5000 [USP'U]/ML
5000 INJECTION, SOLUTION INTRAVENOUS; SUBCUTANEOUS 2 TIMES DAILY
Status: DISCONTINUED | OUTPATIENT
Start: 2020-06-25 | End: 2020-07-03 | Stop reason: HOSPADM

## 2020-06-25 RX ORDER — FENTANYL CITRATE 50 UG/ML
50 INJECTION, SOLUTION INTRAMUSCULAR; INTRAVENOUS
Status: DISCONTINUED | OUTPATIENT
Start: 2020-06-25 | End: 2020-06-28

## 2020-06-25 RX ORDER — SODIUM CHLORIDE 9 MG/ML
INJECTION, SOLUTION INTRAVENOUS EVERY 12 HOURS
Status: DISCONTINUED | OUTPATIENT
Start: 2020-06-26 | End: 2020-07-03 | Stop reason: HOSPADM

## 2020-06-25 RX ORDER — SODIUM CHLORIDE 0.9 % (FLUSH) 0.9 %
10 SYRINGE (ML) INJECTION PRN
Status: CANCELLED | OUTPATIENT
Start: 2020-06-25

## 2020-06-25 RX ORDER — SODIUM CHLORIDE 9 MG/ML
INJECTION, SOLUTION INTRAVENOUS CONTINUOUS
Status: DISCONTINUED | OUTPATIENT
Start: 2020-06-25 | End: 2020-07-02

## 2020-06-25 RX ORDER — SODIUM POLYSTYRENE SULFONATE 15 G/60ML
15 SUSPENSION ORAL; RECTAL ONCE
Status: COMPLETED | OUTPATIENT
Start: 2020-06-25 | End: 2020-06-25

## 2020-06-25 RX ADMIN — HEPARIN SODIUM 5000 UNITS: 5000 INJECTION, SOLUTION INTRAVENOUS; SUBCUTANEOUS at 21:58

## 2020-06-25 RX ADMIN — ACETAMINOPHEN 650 MG: 325 TABLET, FILM COATED ORAL at 21:56

## 2020-06-25 RX ADMIN — FENTANYL CITRATE 50 MCG: 50 INJECTION, SOLUTION INTRAMUSCULAR; INTRAVENOUS at 13:16

## 2020-06-25 RX ADMIN — SODIUM BICARBONATE 50 MEQ: 84 INJECTION, SOLUTION INTRAVENOUS at 16:15

## 2020-06-25 RX ADMIN — ALBUTEROL SULFATE 10 MG: 2.5 SOLUTION RESPIRATORY (INHALATION) at 16:31

## 2020-06-25 RX ADMIN — FENTANYL CITRATE 50 MCG: 50 INJECTION, SOLUTION INTRAMUSCULAR; INTRAVENOUS at 21:59

## 2020-06-25 RX ADMIN — SODIUM CHLORIDE: 9 INJECTION, SOLUTION INTRAVENOUS at 20:06

## 2020-06-25 RX ADMIN — DEXTROSE MONOHYDRATE 25 G: 25 INJECTION, SOLUTION INTRAVENOUS at 16:09

## 2020-06-25 RX ADMIN — PIPERACILLIN AND TAZOBACTAM 3.38 G: 3; .375 INJECTION, POWDER, FOR SOLUTION INTRAVENOUS at 21:58

## 2020-06-25 RX ADMIN — INSULIN HUMAN 10 UNITS: 100 INJECTION, SOLUTION PARENTERAL at 16:12

## 2020-06-25 RX ADMIN — CALCIUM GLUCONATE 1 G: 98 INJECTION, SOLUTION INTRAVENOUS at 16:22

## 2020-06-25 RX ADMIN — SODIUM POLYSTYRENE SULFONATE 15 G: 15 SUSPENSION ORAL; RECTAL at 16:25

## 2020-06-25 RX ADMIN — ONDANSETRON 4 MG: 2 INJECTION INTRAMUSCULAR; INTRAVENOUS at 13:15

## 2020-06-25 RX ADMIN — VANCOMYCIN HYDROCHLORIDE 1000 MG: 1 INJECTION, POWDER, LYOPHILIZED, FOR SOLUTION INTRAVENOUS at 20:11

## 2020-06-25 RX ADMIN — Medication 10 ML: at 20:06

## 2020-06-25 ASSESSMENT — PAIN DESCRIPTION - PROGRESSION
CLINICAL_PROGRESSION: NOT CHANGED
CLINICAL_PROGRESSION: NOT CHANGED

## 2020-06-25 ASSESSMENT — PAIN SCALES - GENERAL
PAINLEVEL_OUTOF10: 6
PAINLEVEL_OUTOF10: 6
PAINLEVEL_OUTOF10: 8
PAINLEVEL_OUTOF10: 7
PAINLEVEL_OUTOF10: 3
PAINLEVEL_OUTOF10: 8

## 2020-06-25 ASSESSMENT — PAIN DESCRIPTION - ORIENTATION: ORIENTATION: LEFT

## 2020-06-25 ASSESSMENT — PAIN DESCRIPTION - DESCRIPTORS: DESCRIPTORS: ACHING

## 2020-06-25 ASSESSMENT — PAIN DESCRIPTION - LOCATION: LOCATION: HIP

## 2020-06-25 ASSESSMENT — PAIN DESCRIPTION - FREQUENCY: FREQUENCY: CONTINUOUS

## 2020-06-25 ASSESSMENT — PAIN DESCRIPTION - ONSET: ONSET: ON-GOING

## 2020-06-25 NOTE — BRIEF OP NOTE
Brief Postoperative Note      Patient: Renea Ledesma  YOB: 1975  MRN: [id number]    Date of Procedure: 6/25/2020    Pre-Op Diagnosis: Renal Failure    Post-Op Diagnosis: Same       Procedure: Insert temporary hemodialysis catheter. Assistant:  IR tech   Anesthesia: local    Estimated Blood Loss (mL): minimal    Complications: none    Specimens:   none  Implants:  14 Fr by 20 cm length temporary hemodialysis catheter      Drains: * No LDAs found *    Findings: Catheter is ready for hemodialysis now.     Electronically signed by Radha Perry on 6/25/2020 at 4:52 PM
Allergy;

## 2020-06-25 NOTE — ED PROVIDER NOTES
History of Present Illness     Patient Identification  Boone Tran is a 54 y.o. male. Patient information was obtained from patient. History/Exam limitations: none. Patient presented to the Emergency Department by private vehicle. Chief Complaint   Hip Pain (pain started saturday and also has foot pain with swelling)      Patient presents for evaluation of a possible skin infection. Onset of symptoms was gradual 5 days ago, with gradually worsening symptoms since that time. Symptoms include chills. Patient reports increasing pain and swelling. There is not a history of trauma to the area. Treatment to date has included OTC analgesics with no relief. Patient is a heroin addict. He denies injecting heroin to  his buttock. Reports generalized malaise and fatigue as well. History reviewed. No pertinent past medical history. History reviewed. No pertinent family history.   Current Facility-Administered Medications   Medication Dose Route Frequency Provider Last Rate Last Dose    fentaNYL (SUBLIMAZE) injection 50 mcg  50 mcg Intravenous Q2H PRN Guy López MD   50 mcg at 06/25/20 1316    ondansetron (ZOFRAN) injection 4 mg  4 mg Intravenous Q6H PRN Guy López MD   4 mg at 06/25/20 1315    insulin regular (HUMULIN R;NOVOLIN R) injection 10 Units  10 Units Intravenous Once Guy López MD        And    dextrose 50 % IV solution  25 g Intravenous Once Guy López MD        glucose (GLUTOSE) 40 % oral gel 15 g  15 g Oral PRN Guy López MD        dextrose 50 % IV solution  12.5 g Intravenous PRN Guy López MD        glucagon (rDNA) injection 1 mg  1 mg Intramuscular PRN Guy López MD        dextrose 5 % solution  100 mL/hr Intravenous PRN Guy López MD        sodium bicarbonate 8.4 % injection 50 mEq  50 mEq Intravenous Once Guy López MD        albuterol (PROVENTIL) nebulizer solution 10 mg  10 mg Nebulization Once Summer Hess MD        sodium polystyrene (KAYEXALATE) 15 GM/60ML suspension 15 g  15 g Oral Once Summer Hess MD         No current outpatient medications on file. No Known Allergies  Social History     Socioeconomic History    Marital status: Single     Spouse name: Not on file    Number of children: Not on file    Years of education: Not on file    Highest education level: Not on file   Occupational History    Not on file   Social Needs    Financial resource strain: Not on file    Food insecurity     Worry: Not on file     Inability: Not on file    Transportation needs     Medical: Not on file     Non-medical: Not on file   Tobacco Use    Smoking status: Current Every Day Smoker     Packs/day: 1.00    Smokeless tobacco: Never Used   Substance and Sexual Activity    Alcohol use: No    Drug use: Yes     Types: Opiates , IV     Comment: heroin    Sexual activity: Not on file   Lifestyle    Physical activity     Days per week: Not on file     Minutes per session: Not on file    Stress: Not on file   Relationships    Social connections     Talks on phone: Not on file     Gets together: Not on file     Attends Evangelical service: Not on file     Active member of club or organization: Not on file     Attends meetings of clubs or organizations: Not on file     Relationship status: Not on file    Intimate partner violence     Fear of current or ex partner: Not on file     Emotionally abused: Not on file     Physically abused: Not on file     Forced sexual activity: Not on file   Other Topics Concern    Not on file   Social History Narrative    Not on file       Review of Systems  Pertinent items are noted in HPI.      Physical Exam     /85   Pulse 67   Temp 98.5 °F (36.9 °C) (Oral)   Resp 16   SpO2 100%   /85   Pulse 67   Temp 98.5 °F (36.9 °C) (Oral)   Resp 16   SpO2 100%     General Appearance:    Alert, cooperative, no distress, appears stated age   Head: Normocephalic, without obvious abnormality, atraumatic   Eyes:    PERRL, conjunctiva/corneas clear, EOM's intact, fundi     benign, both eyes        Ears:    Normal TM's and external ear canals, both ears   Nose:   Nares normal, septum midline, mucosa normal, no drainage    or sinus tenderness   Throat:   Lips, mucosa, and tongue normal; teeth and gums normal   Neck:   Supple, symmetrical, trachea midline, no adenopathy;        thyroid:  No enlargement/tenderness/nodules; no carotid    bruit or JVD   Back:     Symmetric, no curvature, ROM normal, no CVA tenderness   Lungs:     Clear to auscultation bilaterally, respirations unlabored   Chest wall:    No tenderness or deformity   Heart:    Regular rate and rhythm, S1 and S2 normal, no murmur, rub   or gallop   Abdomen:     Soft, non-tender, bowel sounds active all four quadrants,     no masses, no organomegaly           Extremities:   Extremities normal, atraumatic, no cyanosis or edema, tenderness and swelling to left buttock without warmth or erythema. Pulses:   2+ and symmetric all extremities   Skin:   Skin color, texture, turgor normal, no rashes or lesions   Lymph nodes:   Cervical, supraclavicular, and axillary nodes normal   Neurologic:   CNII-XII intact. Normal strength, sensation and reflexes       throughout       ED Course          --------------------------------------------- PAST HISTORY ---------------------------------------------  Past Medical History:  has no past medical history on file. Past Surgical History:  has no past surgical history on file. Social History:  reports that he has been smoking. He has been smoking about 1.00 pack per day. He has never used smokeless tobacco. He reports current drug use. Drugs: Opiates  and IV. He reports that he does not drink alcohol. Family History: family history is not on file. The patients home medications have been reviewed.     Allergies: Patient has no known allergies. -------------------------------------------------- RESULTS -------------------------------------------------    Lab  Results for orders placed or performed during the hospital encounter of 06/25/20   CBC Auto Differential   Result Value Ref Range    WBC 8.5 4.5 - 11.5 E9/L    RBC 4.35 3.80 - 5.80 E12/L    Hemoglobin 14.9 12.5 - 16.5 g/dL    Hematocrit 42.6 37.0 - 54.0 %    MCV 97.9 80.0 - 99.9 fL    MCH 34.3 26.0 - 35.0 pg    MCHC 35.0 (H) 32.0 - 34.5 %    RDW 13.1 11.5 - 15.0 fL    Platelets 346 (L) 374 - 450 E9/L    MPV 11.4 7.0 - 12.0 fL    Neutrophils % 79.1 43.0 - 80.0 %    Immature Granulocytes % 0.9 0.0 - 5.0 %    Lymphocytes % 8.6 (L) 20.0 - 42.0 %    Monocytes % 10.1 2.0 - 12.0 %    Eosinophils % 1.1 0.0 - 6.0 %    Basophils % 0.2 0.0 - 2.0 %    Neutrophils Absolute 6.73 1.80 - 7.30 E9/L    Immature Granulocytes # 0.08 E9/L    Lymphocytes Absolute 0.73 (L) 1.50 - 4.00 E9/L    Monocytes Absolute 0.86 0.10 - 0.95 E9/L    Eosinophils Absolute 0.09 0.05 - 0.50 E9/L    Basophils Absolute 0.02 0.00 - 0.20 E3/A   Basic Metabolic Panel   Result Value Ref Range    Sodium 131 (L) 132 - 146 mmol/L    Potassium 7.4 (HH) 3.5 - 5.0 mmol/L    Chloride 93 (L) 98 - 107 mmol/L    CO2 18 (L) 22 - 29 mmol/L    Anion Gap 20 (H) 7 - 16 mmol/L    Glucose 109 (H) 74 - 99 mg/dL     (H) 6 - 20 mg/dL    CREATININE 10.8 (HH) 0.7 - 1.2 mg/dL    GFR Non-African American 5 >=60 mL/min/1.73    GFR African American 6     Calcium 8.9 8.6 - 10.2 mg/dL   Sedimentation Rate   Result Value Ref Range    Sed Rate 20 (H) 0 - 15 mm/Hr       Radiology  CT PELVIS WO CONTRAST Additional Contrast? None   Final Result   1. No significant change in subcutaneous stranding involving the left flank,   left groin, left hip, left buttock, and proximal left thigh. Considerations   include cellulitis, edema, and hemorrhage.    2. Increased stranding or fluid in the presacral fascia and inferior left   posterior pararenal fascia, likely

## 2020-06-25 NOTE — H&P
[address] [place], [place] [address]ist Group   History and Physical      CHIEF COMPLAINT: Left buttock pain    History of Present Illness:  54 y.o. male with a history of heroin abuse presents with left buttock pain. Six days ago he experienced left leg numbness, right arm pain, which has subsequently resolved. Over the last day he has noticed left buttock pain. In the emergency department, labs notable for creatinine of 10.8, potassium 7.4 (moderately hemolyzed), CK 11,090. Informant(s) for H&P:patient    REVIEW OF SYSTEMS:  no fevers, chills, cp, sob, n/v, ha, vision/hearing changes, wt changes, hot/cold flashes, other open skin lesions, diarrhea, constipation, dysuria/hematuria unless noted in HPI. Complete ROS performed with the patient and is otherwise negative. PMH:  History reviewed. No pertinent past medical history. Surgical History:  History reviewed. No pertinent surgical history. Medications Prior to Admission:    Prior to Admission medications    Not on File       Allergies:    Patient has no known allergies. Social History:    reports that he has been smoking. He has been smoking about 1.00 pack per day. He has never used smokeless tobacco. He reports current drug use. Drugs: Opiates  and IV. He reports that he does not drink alcohol. Family History:   family history includes Diabetes in his mother.      PHYSICAL EXAM:  Vitals:  BP (!) 149/84   Pulse 68   Temp 97.6 °F (36.4 °C) (Oral)   Resp 18   SpO2 100%     General Appearance: alert and oriented to person, place and time and in no acute distress  Skin: warm and dry, scratches on the back, no rash  Head: normocephalic and atraumatic  Eyes: pupils equal, round, and reactive to light, extraocular eye movements intact, conjunctivae normal  Neck: neck supple and non tender without mass   Pulmonary/Chest: clear to auscultation bilaterally- no wheezes, rales or rhonchi, normal air movement, no respiratory distress  Cardiovascular: normal rate, normal S1 and S2 and no carotid bruits  Abdomen: soft, non-tender, non-distended, normal bowel sounds, no masses or organomegaly  Extremities: left buttock erythematous, indurated, tender  Neurologic: no cranial nerve deficit and speech normal    LABS:  Recent Labs     06/25/20  1253 06/25/20  1505 06/25/20  1513   *  --  134   K 7.4*  --  7.1*   CL 93*  --  96*   CO2 18*  --  18*   *  --  143*   CREATININE 10.8*  --  10.8*   GLUCOSE 109* 100 104*   CALCIUM 8.9  --  8.7       Recent Labs     06/25/20  1253   WBC 8.5   RBC 4.35   HGB 14.9   HCT 42.6   MCV 97.9   MCH 34.3   MCHC 35.0*   RDW 13.1   *   MPV 11.4       No results for input(s): POCGLU in the last 72 hours. CBC:   Lab Results   Component Value Date    WBC 8.5 06/25/2020    RBC 4.35 06/25/2020    HGB 14.9 06/25/2020    HCT 42.6 06/25/2020    MCV 97.9 06/25/2020    MCH 34.3 06/25/2020    MCHC 35.0 06/25/2020    RDW 13.1 06/25/2020     06/25/2020    MPV 11.4 06/25/2020     CMP:    Lab Results   Component Value Date     06/25/2020    K 7.1 06/25/2020    CL 96 06/25/2020    CO2 18 06/25/2020     06/25/2020    CREATININE 10.8 06/25/2020    GFRAA 6 06/25/2020    LABGLOM 5 06/25/2020    GLUCOSE 104 06/25/2020    PROT 8.4 04/23/2019    LABALBU 4.8 04/23/2019    CALCIUM 8.7 06/25/2020    BILITOT 0.4 04/23/2019    ALKPHOS 107 04/23/2019    AST 22 04/23/2019    ALT 15 04/23/2019     CK 11,090    Radiology: No results found. EKG: sinus rhythm with peaked T waves in the precordial leads     ASSESSMENT/PLAN:      Principal Problem:    Acute renal failure (ARF) (HCC)  Active Problems:    Non-traumatic rhabdomyolysis    Hyperkalemia    History of drug use  Resolved Problems:    * No resolved hospital problems. *      1.  Acute renal failure secondary to nontraumatic rhabdomyolysis   -admit to ICU  -bicarbonate drip   -serial chemistries   -nephrology consulted for urgent dialysis, spoke with Dr. Kelly Whitfield    2. Nontraumatic rhabdomyolysis   -as above    3. Hyperkalemia with EKG changes   -insulin protocol started  -nephrology consulted for dialysis     4. Left hip erythema and induration   -concern for intramuscular abscess on CT  -general surgery consulted     5. Drug abuse  -urine toxicology screen, patient is not very forthcoming about use history, states he hasn't used in [place] long time\"     Code Status: FULL CODE  DVT prophylaxis: subcutaneous heparin    NOTE: This report was transcribed using voice recognition software.  Every effort was made to ensure accuracy; however, inadvertent computerized transcription errors may be present.     Electronically signed by Kanika Leigh DO on 6/25/2020 at 5:19 PM

## 2020-06-25 NOTE — CONSULTS
Patient seen and examined. Consult dictated. Patient is a 41-year-old gentleman with history of intravenous drug use including intravenous heroin and cocaine who now presents with pain in the left gluteal region with possible abscess. Patient also with acute kidney injury, , hyperkalemia and metabolic acidosis with rhabdomyolysis. CPK level of 11,090 is not typical of the higher levels which are usually associated with acute kidney injury secondary to pigment induced renal failure. Patient has been using large amount of nonsteroidal anti-inflammatory drugs at home in the form of ibuprofen over-the-counter 4 tablets 4 times a day and obviously is volume depleted and that may have played a role in the patient's renal failure. In addition we need to consider possibility of other entities which could lead to renal failure in this patient with intravenous drug use such as immune mediated glomerulonephritis as well as amyloidosis. Unfortunately patient has not given us a urine sample. Patient to be started on hemodialysis because of severe hyperkalemia and renal failure. Dr. Antoinette Sims , Thank You for allowing me to participate in the care of this patient. Will follow the patient with you.     Imtiaz Barrientos MD  Nephrology    Electronically signed by Petrona Wilcox MD on 6/25/2020 at 6:03 PM

## 2020-06-25 NOTE — LETTER
[facility] 4 MED SURG TELE  [address] [facility]  Phone: [phone]    No name on file. July 3, 2020     Patient: Pamela Christopher   YOB: 1975   Date of Visit: 6/25/2020       To Whom It May Concern:     Waqas Leung was admitted into the hospital 6/25/20 and discharged 7/3/20    If you have any questions or concerns, please don't hesitate to call.     Sincerely,        Jasvir Abdi RN

## 2020-06-25 NOTE — PROGRESS NOTES
Pharmacy Consultation Note  (Antibiotic Dosing and Monitoring)    Initial consult date:   Consulting physician: Dr Adolfo Xiong  Drug(s): Vancomycin IV  Indication: Intramuscular abscess in IVDA    Ht Readings from Last 1 Encounters:   20 5' 8\" (1.727 m)     Wt Readings from Last 1 Encounters:   20 133 lb (60.3 kg)       Age/  Gender ActualBW IBW  Allergy Information   54 y.o.     male 68 kg 68.4 kg  Patient has no known allergies. Date  WBC BUN/CR UOP Drug/Dose Time   Given Level(s)   (Time) Comments     (#1) 8.5 143/10.8 -- Vancomycin 1,000 mg one time during last hour of dialysis <2000>       (#2)      Random AM level =       (#3)            (#4)            (#5)            (#6)            (#7)            CrCl cannot be calculated (Unknown ideal weight.). UOP over the past 24 hours:     No intake or output data in the 24 hours ending 20 1825    Temp max: Temp (24hrs), Av.9 °F (36.6 °C), Min:97.5 °F (36.4 °C), Max:98.5 °F (36.9 °C)      Antibiotic Regimen:  Antibiotic Dose Date Initiated   Zosyn   3.375 g IV Q12H      Cultures:  available culture and sensitivity results were reviewed in EPIC  Cultures sent and are pending. Culture Date Result    Blood cx #1    In process   Blood cx #2    In process     Assessment:  · Consulted by Dr. Adolfo Xiong to dose/monitor vancomycin  · Goal trough level:  15-20 mcg/mL  · Pt is a 55 y/o M who presented with concern of a possible skin infection to the buttock. Reports gradually worsening pain and swelling at site and some chills. Hx drug abuse but reports no recent use. Denies injecting into the buttock. Ct concerning for intramuscular abscess.  No history of renal disease; presenting in ARF secondary to rhabdo  · Serum creatinine today: 10.8 ;CrCl ~ 8 mL/min; baseline Scr ~ 0.9  · : nephrology consulted; patient receiving 3 hours HD    Plan:  · Vancomycin 1000 mg one time; dose by levels  · Random level with morning labs  · Follow renal function  · Pharmacist will follow HD schedule and monitor/adjust dosing as necessary      Thank you for the consult,    Destin Day PharmD 6/25/2020 6:57 PM   [phone]

## 2020-06-25 NOTE — LETTER
[facility] 4 MED SURG TELE  [address] [address]  Phone: [phone]          June 29, 2020      Christiane Reddy is a patient admitted under my care. He will be in the hospital for the unseeable future. Please excuse from any court related process at this time.       Sincerely,        Celanese CorporationDINH.

## 2020-06-25 NOTE — LETTER
[facility] 4 MED SURG TELE  [address] [facility]  Phone: [phone]    No name on file. June 29, 2020      Demetra Wagner is a patient admitted under my care. He will be in the hospital for the foreseeable future. Please excuse him from any court related process at this time.                Sincerely,        Alix Sneed D.O.

## 2020-06-26 LAB
ALBUMIN SERPL-MCNC: 3.2 G/DL (ref 3.5–5.2)
ALP BLD-CCNC: 64 U/L (ref 40–129)
ALT SERPL-CCNC: 265 U/L (ref 0–40)
ANION GAP SERPL CALCULATED.3IONS-SCNC: 11 MMOL/L (ref 7–16)
ANION GAP SERPL CALCULATED.3IONS-SCNC: 13 MMOL/L (ref 7–16)
ANION GAP SERPL CALCULATED.3IONS-SCNC: 14 MMOL/L (ref 7–16)
ANION GAP SERPL CALCULATED.3IONS-SCNC: 15 MMOL/L (ref 7–16)
AST SERPL-CCNC: 226 U/L (ref 0–39)
BASOPHILS ABSOLUTE: 0.03 E9/L (ref 0–0.2)
BASOPHILS RELATIVE PERCENT: 0.4 % (ref 0–2)
BETA-HYDROXYBUTYRATE: 0.06 MMOL/L (ref 0.02–0.27)
BILIRUB SERPL-MCNC: 0.5 MG/DL (ref 0–1.2)
BUN BLDV-MCNC: 40 MG/DL (ref 6–20)
BUN BLDV-MCNC: 63 MG/DL (ref 6–20)
BUN BLDV-MCNC: 65 MG/DL (ref 6–20)
BUN BLDV-MCNC: 66 MG/DL (ref 6–20)
C3 COMPLEMENT: 88 MG/DL (ref 90–180)
C4 COMPLEMENT: 27 MG/DL (ref 10–40)
CALCIUM SERPL-MCNC: 8 MG/DL (ref 8.6–10.2)
CALCIUM SERPL-MCNC: 8.2 MG/DL (ref 8.6–10.2)
CALCIUM SERPL-MCNC: 8.3 MG/DL (ref 8.6–10.2)
CALCIUM SERPL-MCNC: 8.3 MG/DL (ref 8.6–10.2)
CHLORIDE BLD-SCNC: 102 MMOL/L (ref 98–107)
CHLORIDE BLD-SCNC: 96 MMOL/L (ref 98–107)
CHLORIDE BLD-SCNC: 96 MMOL/L (ref 98–107)
CHLORIDE BLD-SCNC: 97 MMOL/L (ref 98–107)
CO2: 25 MMOL/L (ref 22–29)
CO2: 26 MMOL/L (ref 22–29)
CREAT SERPL-MCNC: 4.8 MG/DL (ref 0.7–1.2)
CREAT SERPL-MCNC: 6.2 MG/DL (ref 0.7–1.2)
CREAT SERPL-MCNC: 6.6 MG/DL (ref 0.7–1.2)
CREAT SERPL-MCNC: 6.7 MG/DL (ref 0.7–1.2)
EOSINOPHILS ABSOLUTE: 0.12 E9/L (ref 0.05–0.5)
EOSINOPHILS RELATIVE PERCENT: 1.7 % (ref 0–6)
GFR AFRICAN AMERICAN: 11
GFR AFRICAN AMERICAN: 11
GFR AFRICAN AMERICAN: 12
GFR AFRICAN AMERICAN: 16
GFR NON-AFRICAN AMERICAN: 10 ML/MIN/1.73
GFR NON-AFRICAN AMERICAN: 13 ML/MIN/1.73
GFR NON-AFRICAN AMERICAN: 9 ML/MIN/1.73
GFR NON-AFRICAN AMERICAN: 9 ML/MIN/1.73
GLUCOSE BLD-MCNC: 109 MG/DL (ref 74–99)
GLUCOSE BLD-MCNC: 192 MG/DL (ref 74–99)
GLUCOSE BLD-MCNC: 90 MG/DL (ref 74–99)
GLUCOSE BLD-MCNC: 96 MG/DL (ref 74–99)
HCT VFR BLD CALC: 36.6 % (ref 37–54)
HEMOGLOBIN: 13.3 G/DL (ref 12.5–16.5)
IMMATURE GRANULOCYTES #: 0.08 E9/L
IMMATURE GRANULOCYTES %: 1.1 % (ref 0–5)
LACTIC ACID: 1.4 MMOL/L (ref 0.5–2.2)
LYMPHOCYTES ABSOLUTE: 0.93 E9/L (ref 1.5–4)
LYMPHOCYTES RELATIVE PERCENT: 12.8 % (ref 20–42)
MAGNESIUM: 2.1 MG/DL (ref 1.6–2.6)
MAGNESIUM: 2.1 MG/DL (ref 1.6–2.6)
MCH RBC QN AUTO: 34.5 PG (ref 26–35)
MCHC RBC AUTO-ENTMCNC: 36.3 % (ref 32–34.5)
MCV RBC AUTO: 94.8 FL (ref 80–99.9)
MONOCYTES ABSOLUTE: 0.9 E9/L (ref 0.1–0.95)
MONOCYTES RELATIVE PERCENT: 12.4 % (ref 2–12)
NEUTROPHILS ABSOLUTE: 5.21 E9/L (ref 1.8–7.3)
NEUTROPHILS RELATIVE PERCENT: 71.6 % (ref 43–80)
PDW BLD-RTO: 12.9 FL (ref 11.5–15)
PHOSPHORUS: 4.8 MG/DL (ref 2.5–4.5)
PHOSPHORUS: 6.6 MG/DL (ref 2.5–4.5)
PLATELET # BLD: 125 E9/L (ref 130–450)
PMV BLD AUTO: 11.3 FL (ref 7–12)
POTASSIUM SERPL-SCNC: 3.8 MMOL/L (ref 3.5–5)
POTASSIUM SERPL-SCNC: 4.5 MMOL/L (ref 3.5–5)
POTASSIUM SERPL-SCNC: 4.6 MMOL/L (ref 3.5–5)
POTASSIUM SERPL-SCNC: 5.1 MMOL/L (ref 3.5–5)
RBC # BLD: 3.86 E12/L (ref 3.8–5.8)
SODIUM BLD-SCNC: 135 MMOL/L (ref 132–146)
SODIUM BLD-SCNC: 135 MMOL/L (ref 132–146)
SODIUM BLD-SCNC: 137 MMOL/L (ref 132–146)
SODIUM BLD-SCNC: 138 MMOL/L (ref 132–146)
TOTAL CK: 5652 U/L (ref 20–200)
TOTAL CK: 6656 U/L (ref 20–200)
TOTAL PROTEIN: 5.7 G/DL (ref 6.4–8.3)
VANCOMYCIN RANDOM: 14.7 MCG/ML (ref 5–40)
WBC # BLD: 7.3 E9/L (ref 4.5–11.5)

## 2020-06-26 PROCEDURE — 36415 COLL VENOUS BLD VENIPUNCTURE: CPT

## 2020-06-26 PROCEDURE — 2580000003 HC RX 258: Performed by: INTERNAL MEDICINE

## 2020-06-26 PROCEDURE — 80074 ACUTE HEPATITIS PANEL: CPT

## 2020-06-26 PROCEDURE — 6370000000 HC RX 637 (ALT 250 FOR IP): Performed by: INTERNAL MEDICINE

## 2020-06-26 PROCEDURE — 86705 HEP B CORE ANTIBODY IGM: CPT

## 2020-06-26 PROCEDURE — 82550 ASSAY OF CK (CPK): CPT

## 2020-06-26 PROCEDURE — 80048 BASIC METABOLIC PNL TOTAL CA: CPT

## 2020-06-26 PROCEDURE — 86704 HEP B CORE ANTIBODY TOTAL: CPT

## 2020-06-26 PROCEDURE — 6360000002 HC RX W HCPCS: Performed by: INTERNAL MEDICINE

## 2020-06-26 PROCEDURE — 87340 HEPATITIS B SURFACE AG IA: CPT

## 2020-06-26 PROCEDURE — 86160 COMPLEMENT ANTIGEN: CPT

## 2020-06-26 PROCEDURE — 86706 HEP B SURFACE ANTIBODY: CPT

## 2020-06-26 PROCEDURE — 80053 COMPREHEN METABOLIC PANEL: CPT

## 2020-06-26 PROCEDURE — 99254 IP/OBS CNSLTJ NEW/EST MOD 60: CPT | Performed by: SURGERY

## 2020-06-26 PROCEDURE — 86038 ANTINUCLEAR ANTIBODIES: CPT

## 2020-06-26 PROCEDURE — 2060000000 HC ICU INTERMEDIATE R&B

## 2020-06-26 PROCEDURE — 86255 FLUORESCENT ANTIBODY SCREEN: CPT

## 2020-06-26 PROCEDURE — 90935 HEMODIALYSIS ONE EVALUATION: CPT

## 2020-06-26 PROCEDURE — 83735 ASSAY OF MAGNESIUM: CPT

## 2020-06-26 PROCEDURE — 83516 IMMUNOASSAY NONANTIBODY: CPT

## 2020-06-26 PROCEDURE — 84100 ASSAY OF PHOSPHORUS: CPT

## 2020-06-26 PROCEDURE — 86803 HEPATITIS C AB TEST: CPT

## 2020-06-26 PROCEDURE — 85025 COMPLETE CBC W/AUTO DIFF WBC: CPT

## 2020-06-26 PROCEDURE — 86162 COMPLEMENT TOTAL (CH50): CPT

## 2020-06-26 PROCEDURE — 99232 SBSQ HOSP IP/OBS MODERATE 35: CPT | Performed by: INTERNAL MEDICINE

## 2020-06-26 PROCEDURE — 80202 ASSAY OF VANCOMYCIN: CPT

## 2020-06-26 PROCEDURE — 6360000002 HC RX W HCPCS: Performed by: EMERGENCY MEDICINE

## 2020-06-26 RX ORDER — NICOTINE 21 MG/24HR
1 PATCH, TRANSDERMAL 24 HOURS TRANSDERMAL DAILY
Status: DISCONTINUED | OUTPATIENT
Start: 2020-06-26 | End: 2020-07-03 | Stop reason: HOSPADM

## 2020-06-26 RX ADMIN — SODIUM CHLORIDE: 9 INJECTION, SOLUTION INTRAVENOUS at 12:29

## 2020-06-26 RX ADMIN — FENTANYL CITRATE 50 MCG: 50 INJECTION, SOLUTION INTRAMUSCULAR; INTRAVENOUS at 17:13

## 2020-06-26 RX ADMIN — PIPERACILLIN AND TAZOBACTAM 3.38 G: 3; .375 INJECTION, POWDER, FOR SOLUTION INTRAVENOUS at 08:07

## 2020-06-26 RX ADMIN — ACETAMINOPHEN 650 MG: 325 TABLET, FILM COATED ORAL at 12:37

## 2020-06-26 RX ADMIN — VANCOMYCIN HYDROCHLORIDE 750 MG: 1 INJECTION, POWDER, LYOPHILIZED, FOR SOLUTION INTRAVENOUS at 16:34

## 2020-06-26 RX ADMIN — PIPERACILLIN AND TAZOBACTAM 3.38 G: 3; .375 INJECTION, POWDER, FOR SOLUTION INTRAVENOUS at 21:32

## 2020-06-26 RX ADMIN — ACETAMINOPHEN 650 MG: 325 TABLET, FILM COATED ORAL at 05:57

## 2020-06-26 RX ADMIN — FENTANYL CITRATE 50 MCG: 50 INJECTION, SOLUTION INTRAMUSCULAR; INTRAVENOUS at 05:57

## 2020-06-26 RX ADMIN — Medication 10 ML: at 20:13

## 2020-06-26 RX ADMIN — Medication 10 ML: at 08:06

## 2020-06-26 RX ADMIN — FENTANYL CITRATE 50 MCG: 50 INJECTION, SOLUTION INTRAMUSCULAR; INTRAVENOUS at 22:45

## 2020-06-26 RX ADMIN — FENTANYL CITRATE 50 MCG: 50 INJECTION, SOLUTION INTRAMUSCULAR; INTRAVENOUS at 12:28

## 2020-06-26 RX ADMIN — ACETAMINOPHEN 650 MG: 325 TABLET, FILM COATED ORAL at 19:58

## 2020-06-26 RX ADMIN — FENTANYL CITRATE 50 MCG: 50 INJECTION, SOLUTION INTRAMUSCULAR; INTRAVENOUS at 19:58

## 2020-06-26 RX ADMIN — HEPARIN SODIUM 5000 UNITS: 5000 INJECTION, SOLUTION INTRAVENOUS; SUBCUTANEOUS at 08:07

## 2020-06-26 RX ADMIN — HEPARIN SODIUM 5000 UNITS: 5000 INJECTION, SOLUTION INTRAVENOUS; SUBCUTANEOUS at 21:30

## 2020-06-26 ASSESSMENT — PAIN SCALES - GENERAL
PAINLEVEL_OUTOF10: 4
PAINLEVEL_OUTOF10: 0
PAINLEVEL_OUTOF10: 7
PAINLEVEL_OUTOF10: 7
PAINLEVEL_OUTOF10: 0
PAINLEVEL_OUTOF10: 4
PAINLEVEL_OUTOF10: 7
PAINLEVEL_OUTOF10: 4
PAINLEVEL_OUTOF10: 7
PAINLEVEL_OUTOF10: 7
PAINLEVEL_OUTOF10: 0
PAINLEVEL_OUTOF10: 7
PAINLEVEL_OUTOF10: 0

## 2020-06-26 ASSESSMENT — PAIN DESCRIPTION - LOCATION: LOCATION: HIP;BUTTOCKS

## 2020-06-26 ASSESSMENT — PAIN DESCRIPTION - PAIN TYPE: TYPE: ACUTE PAIN

## 2020-06-26 ASSESSMENT — PAIN DESCRIPTION - ONSET: ONSET: ON-GOING

## 2020-06-26 ASSESSMENT — PAIN DESCRIPTION - ORIENTATION: ORIENTATION: LEFT

## 2020-06-26 ASSESSMENT — PAIN DESCRIPTION - FREQUENCY: FREQUENCY: CONTINUOUS

## 2020-06-26 ASSESSMENT — PAIN DESCRIPTION - PROGRESSION: CLINICAL_PROGRESSION: NOT CHANGED

## 2020-06-26 ASSESSMENT — PAIN DESCRIPTION - DESCRIPTORS: DESCRIPTORS: SORE

## 2020-06-26 NOTE — CONSULTS
[address]                                  CONSULTATION    PATIENT NAME: Licha Lovett               :        1975  MED REC NO:   [id number]                            ROOM:       06  ACCOUNT NO:   [de-identified]                           ADMIT DATE: 2020  PROVIDER:     Rodriguez Iyer MD    CONSULT DATE:  2020    ADMITTING PHYSICIAN:  Alma Worley DO    REASON FOR CONSULTATION:  Acute kidney injury and hyperkalemia. HISTORY OF PRESENT ILLNESS:  The patient is being seen in consultation  at the request of Dr. Yoan Topete. The patient is a 43-year-old gentleman  with a prior history of heroin and cocaine use. He presents to the  emergency room at [facility] with chief  complaint of pain in the left gluteal region. The patient states that  he has been having progressively increasing pain in the left gluteal  region and he also believes that he may have a low-grade fever. He  states that he was taking four over-the-counter ibuprofen tablets four  times a day over the last many days prior to presentation. He is not a  very good historian. He does admit to having poor oral intake for the  last one week. Upon presentation, the patient was found to have a serum  potassium of 7.4 mmol/L with a BUN and creatinine of 141 mg/dL and 10.8  mg/dL respectively. His baseline serum creatinine is 0.9 mg/dL from  last year. He does have history of heroin and cocaine use, but states  that he has not used cocaine or heroin for the last few weeks. His CPK  total was 11,090. He has limited urinary output. He is refusing to  have the Ortiz catheter placed. He did have a CT scan of the abdomen  and pelvis without contrast, which revealed enlarged left gluteus  muscle. Chest x-ray was unremarkable.   It is of note that the patient  had presented last month with the fall, had laceration, but at that time  labs were not done. PAST MEDICAL HISTORY:  Essentially unremarkable except for a history of  drug abuse. PAST SURGICAL HISTORY:  Negative. HOME MEDICATIONS:  None except for over-the-counter ibuprofen four  tablets four times a day. ALLERGIES:  No known drug allergies. SOCIAL HISTORY:  The patient has been a smoker and smoke one-pack of  cigarettes a day for the last 20 years. He does use heroin and cocaine  intravenously. He denies any alcohol use. FAMILY HISTORY:  Significant for presence of diabetes mellitus in his  mother who is living. His father is , though the patient is  unaware of his father's health history. REVIEW OF SYSTEMS:  Significant for fever as mentioned above. Denies  any chest pain or palpitations. No shortness of breath. No cough or  wheezing. No hemoptysis. No skin rashes. No abdominal pain,  constipation, or diarrhea, but he has had limited intake and poor  appetite. No dysuria. He has not noticed changes in his urinary output  or frequency. He states that he last passed urine in the emergency  room. He has had pain in the left gluteal region. Rest of the review  of system is negative. PHYSICAL EXAMINATION:  GENERAL:  The patient is awake and alert, in no acute distress. VITAL SIGNS:  Blood pressure 130/84, pulse is 73, respiratory rate is  14, and temperature 98.1 degrees Fahrenheit. HEENT:  Head is normocephalic and atraumatic. Eyes:  Sclerae  nonicteric. Pupils are equal and reactive to light and accommodation. Fundus examination deferred. Mouth and throat:  Dry oral mucosa without  any mucosal ulceration or exudate. NECK:  Supple. No distention. No carotid bruits. No palpable masses. CHEST:  Symmetrical.  LUNGS:  Vesicular breath sounds equal bilaterally. No rales or rhonchi. HEART:  Regular rate and rhythm without any pericardial rub or gallop.   ABDOMEN:  Soft, nondistended, nontender, normal bowel sounds, no rebound  tenderness. No palpable masses. EXTREMITIES:  The patient has no pedal edema. The patient has an  indurated tender area in the left gluteal region. LABORATORY DATA:  From today, sodium 134 mmol/L, potassium 7.1 mmol/L,  chloride 96 mmol/L, CO2 18 mmol/L.  mg/dL, creatinine 10.8  mg/dL. Calcium 8.6 mg/dL. Hemoglobin 14.9 gm and hematocrit 42.6%. Total CPK 11,090. Urine studies have not been done. IMPRESSION:  1. Acute kidney injury. Acute kidney injury in this patient probably  secondary to unknown factors, but probably secondary to volume depletion  with significant use of nonsteroidal antiinflammatory drugs. In  addition, the patient does have rhabdomyolysis, which needs to be  considered possible factor in the patient's renal failure, although the  magnitude of the rhabdomyolysis is usually much more severe than the  current CPK level when associated with renal failure unless we are  catching the current CPK levels after a peak has been reached. We will  avoid volume removal, hydrate the patient. Follow serial CPK levels. In the meantime, we will check serum serology. Eventually, the patient  may need renal biopsy if the renal function does not improve. The  patient does have history of intravenous drug use, which can be  associated with various renal pathologies leading to renal failure  including acute glomerulonephritis mediated through an immune mechanism  as well as possibility of an entity such as amyloidosis. This was  discussed with the patient in detail. 2.  Severe hyperkalemia. Hyperkalemia is secondary to acute kidney  injury. We will intervene with dialysis. 3.  Metabolic acidosis. The patient with increased anion gap metabolic  acidosis with a delta anion gap to delta bicarbonate ratio close to 1. We will adjust dialysate. Check beta-hydroxybutyrate and CPK and  phenylacetic acid level. Follow serial bicarbonate levels. 4.  Rhabdomyolysis. Rhabdomyolysis of unknown etiology but given his   history of heroin use however suspect this is related to heroin use. He does not give any history of injury. We may be having this current   CPK level after a peak. We will follow serial CPK levels. Hydrate the  patient. No fluid removal on dialysis. PLAN:  Plan is to proceed with renal replacement therapy. Follow serial  electrolytes, serum serology to be checked. Check beta-hydroxybutyrate  and lactic acid levels. Rest of the plan as per orders. The patient's  condition also discussed with Dr. Denice Harrington, the intensivist and Dr. Loida Segura. Thank you for allowing me to participate in the care of your patient. We will follow the patient with you.         Sarah Lovell MD    D: 06/25/2020 18:13:45       T: 06/25/2020 19:40:22     [email or web address]  Job#: [id number]     Doc#: [id number]    CC:

## 2020-06-26 NOTE — PROGRESS NOTES
Bailee Briscoe MD  Nephrology    Hemodialysis/Progress note. Patient seen and examined on hemodialyisis. He is feeling better. Main complaint remains pain in the left gluteal region. He has no shortness of breath. No nausea vomiting or dysgeusia. He has a good urinary output. His drug screen was positive for cocaine and fentanyl. Awake and alert . In no acute distress. Vital SignsBP 121/79   Pulse 72   Temp 98.2 °F (36.8 °C) (Oral)   Resp 16   Ht 5' 8\" (1.727 m)   Wt 160 lb 0.9 oz (72.6 kg)   SpO2 96%   BMI 24.34 kg/m²   24HR INTAKE/OUTPUT:    Intake/Output Summary (Last 24 hours) at 6/26/2020 1022  Last data filed at 6/26/2020 0545  Gross per 24 hour   Intake 920 ml   Output 1180 ml   Net -260 ml          Physical  Exam      Neck: No JVD  Lungs: Breath sounds decreased at the bases. No rales or ronchi. Heart: Regular rate and rhythm. No S3 gallop. No murmrur. Abdomen: Soft non distended, non tender and normal bowel sounds. Extremeties: No pedal edema.   There is erythematous indurated and tender area in the left gluteal region        Medications:  Current Facility-Administered Medications   Medication Dose Route Frequency Provider Last Rate Last Dose    fentaNYL (SUBLIMAZE) injection 50 mcg  50 mcg Intravenous Q2H PRN Kari Dove MD   50 mcg at 06/26/20 0557    ondansetron (ZOFRAN) injection 4 mg  4 mg Intravenous Q6H PRN Kari Dove MD   4 mg at 06/25/20 1315    glucose (GLUTOSE) 40 % oral gel 15 g  15 g Oral PRN Kari Dove MD        dextrose 50 % IV solution  12.5 g Intravenous PRN Kari Dove MD        glucagon (rDNA) injection 1 mg  1 mg Intramuscular PRDOUG Dove MD        dextrose 5 % solution  100 mL/hr Intravenous PRDOUG Dove MD        sodium chloride flush 0.9 % injection 10 mL  10 mL Intravenous 2 times per day Rashard Worley, DO   10 mL at 06/26/20 0806    sodium chloride flush 0.9 % injection 10 mL  10 mL Intravenous PRN [employer], DO        acetaminophen (TYLENOL) tablet 650 mg  650 mg Oral Q6H PRN Rashard Worley, DO   650 mg at 06/26/20 9636    Or    acetaminophen (TYLENOL) suppository 650 mg  650 mg Rectal Q6H PRN Rashard Worley, DO        polyethylene glycol (GLYCOLAX) packet 17 g  17 g Oral Daily PRN Rashard Worley, DO        promethazine (PHENERGAN) tablet 12.5 mg  12.5 mg Oral Q6H PRN Rashard Worley, DO        Or    ondansetron (ZOFRAN) injection 4 mg  4 mg Intravenous Q6H PRN Rashard Worley, DO        heparin (porcine) injection 5,000 Units  5,000 Units Subcutaneous BID Rashard Worley, DO   5,000 Units at 06/26/20 0807    0.9 % sodium chloride infusion   Intravenous Continuous Efrain Rise MD Mustapha 70 mL/hr at 06/26/20 0800      vancomycin (VANCOCIN) intermittent dosing (placeholder)   Other RX Placeholder Rashard Worley, DO        piperacillin-tazobactam (ZOSYN) 3.375 g in dextrose 5 % 50 mL IVPB extended infusion (mini-bag)  3.375 g Intravenous Q12H Rashard Worley, DO 12.5 mL/hr at 06/26/20 0807 3.375 g at 06/26/20 9120    And    0.9 % sodium chloride infusion   Intravenous Q12H Rashard Worley, DO           Labs:    CBC:   Recent Labs     06/25/20  1253 06/26/20  0539   WBC 8.5 7.3   HGB 14.9 13.3   * 125*       BMP:   Recent Labs     06/25/20  1513 06/25/20  2353 06/26/20  0539    135 135  137   K 7.1* 3.8 4.6  4.5   CL 96* 96* 96*  97*   CO2 18* 25 26 25   * 63* 65*  66*   CREATININE 10.8* 6.2* 6.7*  6.6*   GLUCOSE 104* 192* 96  90       Phos and Calcium:  Lab Results   Component Value Date    CALCIUM 8.2 (L) 06/26/2020    CALCIUM 8.3 (L) 06/26/2020    PHOS 6.6 (H) 06/26/2020          Hemodialysis Note  Procedure: Hemodialysis  Access:  R IJ Temporary Hemodialysis Catheter  Dialysate: 3  K     2.5  Ca  Indication: Acute kidney injury and hyperkalemia   Goal: Diffuse dialytic support and ultrafilteration    Assessment/Plan:    1.    Acute kidney injury. Acute kidney injury secondary to multiple factors including volume depletion in the setting of use of large amount of nonsteroidal anti-inflammatory drugs along with possible component of rhabdomyolysis contributing the patient's renal failure. Patient has had improved urinary output. Will reassess tomorrow morning for further need for ongoing hemodialysis. 2.   Hyperkalemia. Hyperkalemia secondary to acute kidney injury. Improved. .    3.   Metabolic acidosis. Patient with increased anion gap metabolic acidosis with a delta anion gap to delta bicarbonate ratio close to 1. This was secondary to acute kidney injury. Bicarbonate levels are now normal..    4.   Rhabdomyolysis. Rhabdomyolysis most likely due to his cocaine use. CPK levels lower. 5.  Hyperphosphatemia. This reflects acute kidney injury and rhabdomyolysis. Expect phosphorus levels to improve with increased solute clearance. No indication for use of binders at this time. Discussed with intensivist Dr. Maryann Blackwell. Vallorie Krabbe MD  Nephrology  Electronically signed by Vallorie Krabbe, MD on 6/26/2020 at 10:20 AM    Note: This report was completed utilizing computer voice recognition software. Every effort has been made to ensure accuracy, however; inadvertent computerized transcription errors may be present.

## 2020-06-26 NOTE — CONSULTS
1 Saurabh Myles MD    Patient's Name/Date of Birth: Nighat Mcallister / 1975    Date: June 26, 2020     Consulting Surgeon: Dewey Cerda MD    PCP: Sb Whitehead DO     Chief Complaint: L buttock erythema    HPI:   Nighat Mcallister is a 54 y.o. male who presents for evaluation of rhabdomyolysis. CT suggested possible intramuscular left buttock abscess. I was consulted for evaluation and possible I&D. Patient complains of some mild tenderness on the buttock. He was started on vancomycin in his symptoms are improving. Patient Active Problem List   Diagnosis    Acute renal failure (ARF) ([facility] [address]    History of drug use    Non-traumatic rhabdomyolysis    Hyperkalemia       No Known Allergies    History reviewed. No pertinent past medical history. History reviewed. No pertinent surgical history.     Social History     Socioeconomic History    Marital status: Single     Spouse name: Not on file    Number of children: Not on file    Years of education: Not on file    Highest education level: Not on file   Occupational History    Not on file   Social Needs    Financial resource strain: Not on file    Food insecurity     Worry: Not on file     Inability: Not on file    Transportation needs     Medical: Not on file     Non-medical: Not on file   Tobacco Use    Smoking status: Current Every Day Smoker     Packs/day: 1.00    Smokeless tobacco: Never Used   Substance and Sexual Activity    Alcohol use: No    Drug use: Yes     Types: Opiates , IV     Comment: heroin    Sexual activity: Not on file   Lifestyle    Physical activity     Days per week: Not on file     Minutes per session: Not on file    Stress: Not on file   Relationships    Social connections     Talks on phone: Not on file     Gets together: Not on file     Attends Synagogue service: Not on file     Active member of club or organization: Not on file     Attends meetings of clubs or organizations: Not on file     Relationship status: Not on file    Intimate partner violence     Fear of current or ex partner: Not on file     Emotionally abused: Not on file     Physically abused: Not on file     Forced sexual activity: Not on file   Other Topics Concern    Not on file   Social History Narrative    Not on file       The patient has a family history that is negative for severe cardiovascular or respiratory issues, negative for reaction to anesthesia. Recent Labs     06/25/20  1253 06/26/20  0539   WBC 8.5 7.3   HGB 14.9 13.3   HCT 42.6 36.6*   MCV 97.9 94.8   * 125*       Recent Labs     06/25/20  1513 06/25/20  2353 06/26/20  0539    135 135  137   K 7.1* 3.8 4.6  4.5   CO2 18* 25 26 25   PHOS  --  4.8* 6.6*   * 63* 65*  66*   CREATININE 10.8* 6.2* 6.7*  6.6*       Recent Labs     06/25/20  1513 06/26/20  0539   PROT  --  5.7*   INR 0.9  --          Intake/Output Summary (Last 24 hours) at 6/26/2020 1514  Last data filed at 6/26/2020 1418  Gross per 24 hour   Intake 1445 ml   Output 1830 ml   Net -385 ml       I have reviewed relevant labs from this admission and interpretation is included in my assessment and plan      Review of Systems  A complete 10 system review was performed and are otherwise negative unless mentioned in the above HPI. Specific negatives are listed below but may not include all those reviewed.     General ROS: negative obtundation, AMS  ENT ROS: negative rhinorrhea, epistaxis  Allergy and Immunology ROS: negative itchy/watery eyes or nasal congestion  Hematological and Lymphatic ROS: negative spontaneous bleeding or bruising  Endocrine ROS: negative  lethargy, mood swings, palpitations or polydipsia/polyuria  Respiratory ROS: negative sputum changes, stridor, tachypnea or wheezing  Cardiovascular ROS: negative for - loss of consciousness, murmur or orthopnea  Gastrointestinal ROS: negative for - hematochezia or hematemesis  Genito-Urinary ROS: negative for -  genital discharge or hematuria  Musculoskeletal ROS: negative for - focal weakness, gangrene  Psych/Neuro ROS: negative for - visual or auditory hallucinations, suicidal ideation      Physical exam:   BP (!) 105/54   Pulse 73   Temp 98.2 °F (36.8 °C) (Oral)   Resp 14   Ht 5' 8\" (1.727 m)   Wt 159 lb 13.3 oz (72.5 kg)   SpO2 98%   BMI 24.30 kg/m²   General appearance:  NAD, appears stated age  Head: NCAT, PERRLA, EOMI, red conjunctiva  Neck: supple, no masses, trachea midline  Lungs: Equal chest rise bilateral, no retractions, no wheezing  Heart: Reg rate  Abdomen: soft, NT, ND  Skin; warm and dry, no cyanosis. No induration or fluctuance in the buttocks. Mild erythema. Gu: no cva tenderness  Extremities: atraumatic, no focal motor deficits, no open wounds  Psych: No tremor, visual hallucinations    Pathology: n/a    Radiology: I reviewed relevant abdominal imaging from this admission and that available in the EMR including CT pel from 6/25/20. My assessment is left buttock stranding    Assessment:  Antoni Rojas is a 54 y.o. male with left buttock erythema, rule out abscess    Patient Active Problem List   Diagnosis    [facility] failure ([facility]F) ([facility] [address]    History of drug use    Non-traumatic rhabdomyolysis    Hyperkalemia       Plan:  No clinical evidence of abscess at this time. We will continue antibiotics and reexamine the patient over the upcoming days. Will follow   Time spent reviewing past medical, surgical, social and family history, vitals, nursing assessment and images. Time spent face to face with patient and family counciling and discussing care exceeded 50% of the time of the consult. Additional time spent reviewing images and labs, discussing case with nursing, support staff and other physicians; as well as coordinating care.         Physician Signature: Electronically signed by Dr. Joesph Han

## 2020-06-26 NOTE — FLOWSHEET NOTE
06/25/20 2119   Vital Signs   /72   Temp 98.3 °F (36.8 °C)   Pulse 75   Resp 21   SpO2 100 %   Weight 159 lb 13.3 oz (72.5 kg)   Weight Method Bedside scale   Percent Weight Change 6.56   Post-Hemodialysis Assessment   Post-Treatment Procedures Blood returned;Catheter capped, clamped and heparinized x 2 ports   Machine Disinfection Process Acid/Vinegar Clean;Exterior Machine Disinfection;Bleach   Rinseback Volume (ml) 200 ml   Dialyzer Clearance Lightly streaked   Duration of Treatment (minutes) 180 minutes   NET Removed (ml) 0 ml   Tolerated Treatment Good   Patient Response to Treatment Tolerated well   Bilateral Breath Sounds Clear   Edema Generalized

## 2020-06-26 NOTE — FLOWSHEET NOTE
06/26/20 1157   Vital Signs   /84   Temp 98.2 °F (36.8 °C)   Pulse 69   Resp 14   Weight 159 lb 13.3 oz (72.5 kg)   Weight Method Bed scale   Percent Weight Change -0.14   Pain Assessment   Pain Assessment 0-10   Pain Level 0   Post-Hemodialysis Assessment   Post-Treatment Procedures Blood returned;Catheter capped, clamped and heparinized x 2 ports   Machine Disinfection Process Acid/Vinegar Clean;Bleach; Exterior Machine Disinfection   Rinseback Volume (ml) 300 ml   Duration of Treatment (minutes) 180 minutes   NET Removed (ml) 394 ml   Tolerated Treatment Good   Patient Response to Treatment tolerated well   Bilateral Breath Sounds Clear   Edema None   Edema Generalized None

## 2020-06-26 NOTE — CARE COORDINATION
SS NOTE: SW met with pt today. Pt relates that he resides with his Aunts- sometimes with Kelsey Villeda and other times with his [address] in their homes. Pt relates that he was I pta with adls and ialds. Pt admits to the cocaine and heroin addictions. He relates to having been to detox in March of 2020 at [facility]. He is ready to try Recovery again and approves speaking with Chelsea Walker of [facility]. Pt related that before his relapse he was planning to go to NA meetings- but never started them. Pt at this time does not have a PCP and will need choice and set up for one at [facility]. He approves using the [facility] PCP program.   SW made contact with Chelsea Walker of [facility] and he will see pt for support and recovery programming. Alex Hooks. 6/26/2020.1:56 PM.

## 2020-06-26 NOTE — PROGRESS NOTES
[address] [place], [place] [address]ist   Progress Note    Admitting Date and Time: 6/25/2020 10:54 AM  Admit Dx: Acute renal failure (ARF) (HCC) [N17.9]  Acute renal failure (ARF) (HCC) [N17.9]    Subjective:    Pt feels well this morning. Still has soreness of his left buttock. Had dialysis yesterday evening, tolerated well. This am BUN 65, creatinine 6.7, potassium 4.6, CK 5,652. ROS: denies fever, chills, cp, sob, n/v, HA unless stated above.      sodium chloride flush  10 mL Intravenous 2 times per day    heparin (porcine)  5,000 Units Subcutaneous BID    vancomycin (VANCOCIN) intermittent dosing (placeholder)   Other RX Placeholder    piperacillin-tazobactam  3.375 g Intravenous Q12H     fentanNYL, 50 mcg, Q2H PRN  ondansetron, 4 mg, Q6H PRN  glucose, 15 g, PRN  dextrose, 12.5 g, PRN  glucagon (rDNA), 1 mg, PRN  dextrose, 100 mL/hr, PRN  sodium chloride flush, 10 mL, PRN  acetaminophen, 650 mg, Q6H PRN    Or  acetaminophen, 650 mg, Q6H PRN  polyethylene glycol, 17 g, Daily PRN  promethazine, 12.5 mg, Q6H PRN    Or  ondansetron, 4 mg, Q6H PRN         Objective:    /75   Pulse 68   Temp 98.2 °F (36.8 °C) (Oral)   Resp 16   Ht 5' 8\" (1.727 m)   Wt 160 lb 15 oz (73 kg)   SpO2 96%   BMI 24.47 kg/m²   General Appearance: alert and oriented to person, place and time and in no acute distress  Skin: warm and dry, scratches on the back, no rash  Head: normocephalic and atraumatic  Eyes: pupils equal, round, and reactive to light, extraocular eye movements intact, conjunctivae normal  Neck: neck supple and non tender without mass   Pulmonary/Chest: clear to auscultation bilaterally- no wheezes, rales or rhonchi, normal air movement, no respiratory distress  Cardiovascular: normal rate, normal S1 and S2 and no carotid bruits  Abdomen: soft, non-tender, non-distended, normal bowel sounds, no masses or organomegaly  Extremities: left buttock erythematous, indurated, tender  Neurologic: no cranial nerve deficit and speech normal         Recent Labs     06/25/20  1513 06/25/20  2353 06/26/20  0539    135 135  137   K 7.1* 3.8 4.6  4.5   CL 96* 96* 96*  97*   CO2 18* 25 26 25   * 63* 65*  66*   CREATININE 10.8* 6.2* 6.7*  6.6*   GLUCOSE 104* 192* 96  90   CALCIUM 8.7 8.3* 8.2*  8.3*       Recent Labs     06/26/20  0539   ALKPHOS 64   PROT 5.7*   LABALBU 3.2*   BILITOT 0.5   *   *       Recent Labs     06/25/20  1253 06/26/20  0539   WBC 8.5 7.3   RBC 4.35 3.86   HGB 14.9 13.3   HCT 42.6 36.6*   MCV 97.9 94.8   MCH 34.3 34.5   MCHC 35.0* 36.3*   RDW 13.1 12.9   * 125*   MPV 11.4 11.3       CBC:   Lab Results   Component Value Date    WBC 7.3 06/26/2020    RBC 3.86 06/26/2020    HGB 13.3 06/26/2020    HCT 36.6 06/26/2020    MCV 94.8 06/26/2020    MCH 34.5 06/26/2020    MCHC 36.3 06/26/2020    RDW 12.9 06/26/2020     06/26/2020    MPV 11.3 06/26/2020     BMP:    Lab Results   Component Value Date     06/26/2020     06/26/2020    K 4.6 06/26/2020    K 4.5 06/26/2020    CL 96 06/26/2020    CL 97 06/26/2020    CO2 26 06/26/2020    CO2 25 06/26/2020    BUN 65 06/26/2020    BUN 66 06/26/2020    LABALBU 3.2 06/26/2020    CREATININE 6.7 06/26/2020    CREATININE 6.6 06/26/2020    CALCIUM 8.2 06/26/2020    CALCIUM 8.3 06/26/2020    GFRAA 11 06/26/2020    GFRAA 11 06/26/2020    LABGLOM 9 06/26/2020    LABGLOM 9 06/26/2020    GLUCOSE 96 06/26/2020    GLUCOSE 90 06/26/2020      CK 5,652     Radiology:   CT PELVIS WO CONTRAST Additional Contrast? None   Final Result   Limited evaluation without intravenous contrast.      Left buttock inflammatory changes. No subcutaneous discrete fluid collection. Enlarged left gluteus beverly muscle likely related to infection. An   intramuscular abscess is not excluded.          IR FLUORO GUIDED CVA DEVICE PLMT/REPLACE/REMOVAL   Final Result   Satisfactory insertion of a temporary hemodialysis catheter; the catheter is ready for use. IR ULTRASOUND GUIDANCE VASCULAR ACCESS   Final Result   Satisfactory insertion of a temporary hemodialysis catheter; the catheter is   ready for use. Assessment/Plan:  Principal Problem:    Acute renal failure (ARF) (HCC)  Active Problems:    Non-traumatic rhabdomyolysis    Hyperkalemia    History of drug use  Resolved Problems:    * No resolved hospital problems. *      1. CHERY  -admit to ICU  -IVF NS at 70mL/hr per nephrology   -serial chemistries   -intermittent HD per nephrology   -probable component of rhabdomyolysis and other possible etiologies  -MEEK, ANCA, total complement, anti GBM per nephrology     2. Nontraumatic rhabdomyolysis   -as above     3. Hyperkalemia with EKG changes   -improved to 4.6 with HD     4. Left buttock erythema and induration   -concern for intramuscular abscess on CT  -on empiric IV vancomycin and Zosyn  -general surgery consulted      5. Drug abuse  -urine toxicology screen, patient is not very forthcoming about use history, states he hasn't used in [place] long time\"   -urine toxicology positive for cocaine and fentanyl   -counseled about drug use     NOTE: This report was transcribed using voice recognition software. Every effort was made to ensure accuracy; however, inadvertent computerized transcription errors may be present.      Electronically signed by Celanese CorporationDO on 6/26/2020 at 8:06 AM

## 2020-06-26 NOTE — CONSULTS
file   Lifestyle    Physical activity     Days per week: Not on file     Minutes per session: Not on file    Stress: Not on file   Relationships    Social connections     Talks on phone: Not on file     Gets together: Not on file     Attends Druze service: Not on file     Active member of club or organization: Not on file     Attends meetings of clubs or organizations: Not on file     Relationship status: Not on file    Intimate partner violence     Fear of current or ex partner: Not on file     Emotionally abused: Not on file     Physically abused: Not on file     Forced sexual activity: Not on file   Other Topics Concern    Not on file   Social History Narrative    Not on file       MEDICAL HISTORY:  History reviewed. No pertinent past medical history. MEDICATIONS:   vancomycin  750 mg Intravenous Once    sodium chloride flush  10 mL Intravenous 2 times per day    heparin (porcine)  5,000 Units Subcutaneous BID    vancomycin (VANCOCIN) intermittent dosing (placeholder)   Other RX Placeholder    piperacillin-tazobactam  3.375 g Intravenous Q12H      dextrose      sodium chloride 70 mL/hr at 06/26/20 1229    sodium chloride       fentanNYL, ondansetron, glucose, dextrose, glucagon (rDNA), dextrose, sodium chloride flush, acetaminophen **OR** acetaminophen, polyethylene glycol, promethazine **OR** ondansetron    REVIEW OF SYSTEMS:  Constitutional: Denies fever, weight loss, night sweats, and fatigue  Skin: Denies pigmentation, dark lesions, and rashes   HEENT: Denies hearing loss, tinnitus, ear drainage, epistaxis, sore throat, and hoarseness. Cardiovascular: Denies palpitations, chest pain, and chest pressure. Respiratory: Denies cough, dyspnea at rest, hemoptysis, apnea, and choking.   Gastrointestinal: Denies nausea, vomiting, poor appetite, diarrhea, heartburn or reflux  Genitourinary: Denies dysuria, frequency, urgency or hematuria  Musculoskeletal: Denies myalgias, muscle weakness, and bone pain  Neurological: Denies dizziness, vertigo, headache, and focal weakness  Psychological: Denies anxiety and depression  Endocrine: Denies heat intolerance and cold intolerance  Hematopoietic/Lymphatic: Denies bleeding problems and blood transfusions    PHYSICAL EXAM:  Vitals:    06/26/20 1145   BP: 137/73   Pulse: 70   Resp:    Temp:    SpO2:            O2 Device: None (Room air)    Constitutional: No fever, chills, diaphoresis  Skin: No skin rash, no skin breakdown. There is no area of distinct fluctuance at the left buttocks but there is some slight firmness there. HEENT: Unremarkable  Neck: No JVD, lymphadenopathy, thyromegaly  Cardiovascular: S1, S2 normal.  No S3, S4 murmurs or rubs present  Respiratory: Clear to auscultation bilaterally  Gastrointestinal: Soft, flat, nontender  Genitourinary: No CVA tenderness, urine slightly pink-tinged  Extremities: No clubbing, cyanosis, or edema  Neurological:, Alert, oriented x3. No evidence of focal motor or sensory deficits.   No evidence of asterixis  Psychological: Affect somewhat flat but no evidence of confusion or delirium    LABS:  WBC   Date Value Ref Range Status   06/26/2020 7.3 4.5 - 11.5 E9/L Final   06/25/2020 8.5 4.5 - 11.5 E9/L Final   04/23/2019 11.0 4.5 - 11.5 E9/L Final     Hemoglobin   Date Value Ref Range Status   06/26/2020 13.3 12.5 - 16.5 g/dL Final   06/25/2020 14.9 12.5 - 16.5 g/dL Final   04/23/2019 14.6 12.5 - 16.5 g/dL Final     Hematocrit   Date Value Ref Range Status   06/26/2020 36.6 (L) 37.0 - 54.0 % Final   06/25/2020 42.6 37.0 - 54.0 % Final   04/23/2019 43.3 37.0 - 54.0 % Final     MCV   Date Value Ref Range Status   06/26/2020 94.8 80.0 - 99.9 fL Final   06/25/2020 97.9 80.0 - 99.9 fL Final   04/23/2019 94.1 80.0 - 99.9 fL Final     Platelets   Date Value Ref Range Status   06/26/2020 125 (L) 130 - 450 E9/L Final   06/25/2020 127 (L) 130 - 450 E9/L Final   04/23/2019 191 130 - 450 E9/L Final     Sodium   Date Value Ref Range Status   06/26/2020 135 132 - 146 mmol/L Final   06/26/2020 137 132 - 146 mmol/L Final   06/25/2020 135 132 - 146 mmol/L Final     Potassium   Date Value Ref Range Status   06/26/2020 4.6 3.5 - 5.0 mmol/L Final   06/26/2020 4.5 3.5 - 5.0 mmol/L Final   06/25/2020 3.8 3.5 - 5.0 mmol/L Final     Chloride   Date Value Ref Range Status   06/26/2020 96 (L) 98 - 107 mmol/L Final   06/26/2020 97 (L) 98 - 107 mmol/L Final   06/25/2020 96 (L) 98 - 107 mmol/L Final     CO2   Date Value Ref Range Status   06/26/2020 26 22 - 29 mmol/L Final   06/26/2020 25 22 - 29 mmol/L Final   06/25/2020 25 22 - 29 mmol/L Final     BUN   Date Value Ref Range Status   06/26/2020 65 (H) 6 - 20 mg/dL Final   06/26/2020 66 (H) 6 - 20 mg/dL Final   06/25/2020 63 (H) 6 - 20 mg/dL Final     CREATININE   Date Value Ref Range Status   06/26/2020 6.7 (H) 0.7 - 1.2 mg/dL Final   06/26/2020 6.6 (H) 0.7 - 1.2 mg/dL Final   06/25/2020 6.2 (H) 0.7 - 1.2 mg/dL Final     Glucose   Date Value Ref Range Status   06/26/2020 96 74 - 99 mg/dL Final   06/26/2020 90 74 - 99 mg/dL Final   06/25/2020 192 (H) 74 - 99 mg/dL Final     Calcium   Date Value Ref Range Status   06/26/2020 8.2 (L) 8.6 - 10.2 mg/dL Final   06/26/2020 8.3 (L) 8.6 - 10.2 mg/dL Final   06/25/2020 8.3 (L) 8.6 - 10.2 mg/dL Final     Total Protein   Date Value Ref Range Status   06/26/2020 5.7 (L) 6.4 - 8.3 g/dL Final   04/23/2019 8.4 (H) 6.4 - 8.3 g/dL Final     Alb   Date Value Ref Range Status   06/26/2020 3.2 (L) 3.5 - 5.2 g/dL Final   04/23/2019 4.8 3.5 - 5.2 g/dL Final     Total Bilirubin   Date Value Ref Range Status   06/26/2020 0.5 0.0 - 1.2 mg/dL Final   04/23/2019 0.4 0.0 - 1.2 mg/dL Final     Alkaline Phosphatase   Date Value Ref Range Status   06/26/2020 64 40 - 129 U/L Final   04/23/2019 107 40 - 129 U/L Final     AST   Date Value Ref Range Status   06/26/2020 226 (H) 0 - 39 U/L Final   04/23/2019 22 0 - 39 U/L Final     ALT   Date Value Ref Range Status   06/26/2020 265 (H) 0 - 40 U/L Final   04/23/2019 15 0 - 40 U/L Final     GFR Non-   Date Value Ref Range Status   06/26/2020 9 >=60 mL/min/1.73 Final     Comment:     Chronic Kidney Disease: less than 60 ml/min/1.73 sq.m. Kidney Failure: less than 15 ml/min/1.73 sq.m. Results valid for patients 18 years and older. 06/26/2020 9 >=60 mL/min/1.73 Final     Comment:     Chronic Kidney Disease: less than 60 ml/min/1.73 sq.m. Kidney Failure: less than 15 ml/min/1.73 sq.m. Results valid for patients 18 years and older. 06/25/2020 10 >=60 mL/min/1.73 Final     Comment:     Chronic Kidney Disease: less than 60 ml/min/1.73 sq.m. Kidney Failure: less than 15 ml/min/1.73 sq.m. Results valid for patients 18 years and older. GFR    Date Value Ref Range Status   06/26/2020 11  Final   06/26/2020 11  Final   06/25/2020 12  Final     Magnesium   Date Value Ref Range Status   06/26/2020 2.1 1.6 - 2.6 mg/dL Final   06/25/2020 2.1 1.6 - 2.6 mg/dL Final     Phosphorus   Date Value Ref Range Status   06/26/2020 6.6 (H) 2.5 - 4.5 mg/dL Final   06/25/2020 4.8 (H) 2.5 - 4.5 mg/dL Final     No results for input(s): PH, PO2, PCO2, HCO3, BE, O2SAT in the last 72 hours. RADIOLOGY:  CT PELVIS WO CONTRAST Additional Contrast? None   Final Result   Limited evaluation without intravenous contrast.      Left buttock inflammatory changes. No subcutaneous discrete fluid collection. Enlarged left gluteus beverly muscle likely related to infection. An   intramuscular abscess is not excluded. IR FLUORO GUIDED CVA DEVICE PLMT/REPLACE/REMOVAL   Final Result   Satisfactory insertion of a temporary hemodialysis catheter; the catheter is   ready for use. IR ULTRASOUND GUIDANCE VASCULAR ACCESS   Final Result   Satisfactory insertion of a temporary hemodialysis catheter; the catheter is   ready for use. IMPRESSION:  1.  Acute kidney injury possibly secondary to rhabdomyolysis  2. Extensive history of drug abuse including fentanyl, cocaine, and perhaps more remotely heroin  3. Left buttocks contusion and/or early abscess possibly secondary to drug use  4. Elevated liver enzymes-must consider the possibility of hepatitis C  5. Nicotine addiction    PLAN:  1. Continue to dialyze per nephrology service  2. Dose of vancomycin after dialysis  3. May transfer to regular medical floor  4. Monitor renal and hepatic function closely    ATTESTATION:  ICU Staff Physician note of personal involvement in Care  As the attending physician, I certify that I personally reviewed the patients history and personally examined the patient to confirm the physical findings described above,  And that I reviewed the relevant imaging studies and available reports. I also discussed the differential diagnosis and all of the proposed management plans with the patient and individuals accompanying the patient to this visit. They had the opportunity to ask questions about the proposed management plans and to have those questions answered. This patient has a high probability of sudden, clinically significant deterioration, which requires the highest level of physician preparedness to intervene urgently. I managed/supervised life or organ supporting interventions that required frequent physician assessment. I devoted my full attention to the direct care of this patient for the amount of time indicated below. Time I spent with the family or surrogate(s) is included only if the patient was incapable of providing the necessary information or participating in medical decisions - Time devoted to teaching and to any procedures I billed separately is not included.     CRITICAL CARE TIME:  33 minutes    Electronically signed by Juana Malik MD on 6/26/2020 at 12:49 PM

## 2020-06-26 NOTE — PROGRESS NOTES
Pharmacy Consultation Note  (Antibiotic Dosing and Monitoring)    Initial consult date:   Consulting physician: Dr Jasvir Girard  Drug(s): Vancomycin IV  Indication: Intramuscular abscess in IVDA    Ht Readings from Last 1 Encounters:   20 5' 8\" (1.727 m)     Wt Readings from Last 1 Encounters:   20 160 lb 0.9 oz (72.6 kg)       Age/  Gender ActualBW IBW  Allergy Information   54 y.o.     male 68 kg 68.4 kg  Patient has no known allergies. Date  WBC BUN/CR UOP Drug/Dose Time   Given Level(s)   (Time) Comments     (#1) 8.5 143/10.8 -- Vancomycin 1,000 mg one time during last hour of dialysis <2000>       (#2) 7.3 65/6.7 0.68 mL/kg/hr Vancomycin 750 mg IV once <1300> Random AM level = 14.7 mcg/mL       (#3)            (#4)            (#5)            (#6)            (#7)            Estimated Creatinine Clearance: 13 mL/min (A) (based on SCr of 6.7 mg/dL (H)). UOP over the past 24 hours:       Intake/Output Summary (Last 24 hours) at 2020 1203  Last data filed at 2020 0545  Gross per 24 hour   Intake 920 ml   Output 1180 ml   Net -260 ml       Temp max: Temp (24hrs), Av.9 °F (36.6 °C), Min:96.8 °F (36 °C), Max:98.3 °F (36.8 °C)      Antibiotic Regimen:  Antibiotic Dose Date Initiated   Zosyn   3.375 g IV Q12H      Cultures:  available culture and sensitivity results were reviewed in EPIC  Cultures sent and are pending. Culture Date Result    Blood cx #1    In process   Blood cx #2    In process     Assessment:  · Consulted by Dr. Jasvir Girard to dose/monitor vancomycin  · Goal trough level:  15-20 mcg/mL  · Pt is a 55 y/o M who presented with concern of a possible skin infection to the buttock. Reports gradually worsening pain and swelling at site and some chills. Hx drug abuse but reports no recent use. Denies injecting into the buttock. Ct concerning for intramuscular abscess.  No history of renal disease; presenting in ARF secondary to rhabdo  · Serum creatinine today: 6.7 ;CrCl ~ 13 mL/min; baseline Scr ~ 0.9  · 6/25: nephrology consulted; patient receiving 3 hours HD  · 6/26: Pre-dialysis level = 14.7 mcg/mL; receiving 3 hours HD.  Patient is producing urine as well    Plan:  · Vancomycin 750 mg one time  · Repeat random level tomorrow with morning labs  · Follow renal function  · Pharmacist will follow HD schedule and monitor/adjust dosing as necessary      Thank you for the consult,    Joaquim Hernández, PharmD 6/26/2020 12:03 PM   [phone]

## 2020-06-27 ENCOUNTER — APPOINTMENT (OUTPATIENT)
Dept: CT IMAGING | Age: 45
DRG: 469 | End: 2020-06-27
Payer: MEDICAID

## 2020-06-27 LAB
ALBUMIN SERPL-MCNC: 3.3 G/DL (ref 3.5–5.2)
ALP BLD-CCNC: 59 U/L (ref 40–129)
ALT SERPL-CCNC: 199 U/L (ref 0–40)
ANION GAP SERPL CALCULATED.3IONS-SCNC: 12 MMOL/L (ref 7–16)
ANION GAP SERPL CALCULATED.3IONS-SCNC: 13 MMOL/L (ref 7–16)
AST SERPL-CCNC: 131 U/L (ref 0–39)
BASOPHILS ABSOLUTE: 0.05 E9/L (ref 0–0.2)
BASOPHILS RELATIVE PERCENT: 0.5 % (ref 0–2)
BILIRUB SERPL-MCNC: 0.6 MG/DL (ref 0–1.2)
BUN BLDV-MCNC: 32 MG/DL (ref 6–20)
BUN BLDV-MCNC: 43 MG/DL (ref 6–20)
CALCIUM SERPL-MCNC: 8.3 MG/DL (ref 8.6–10.2)
CALCIUM SERPL-MCNC: 8.6 MG/DL (ref 8.6–10.2)
CHLORIDE BLD-SCNC: 101 MMOL/L (ref 98–107)
CHLORIDE BLD-SCNC: 103 MMOL/L (ref 98–107)
CO2: 25 MMOL/L (ref 22–29)
CO2: 25 MMOL/L (ref 22–29)
CREAT SERPL-MCNC: 4 MG/DL (ref 0.7–1.2)
CREAT SERPL-MCNC: 5.1 MG/DL (ref 0.7–1.2)
EKG ATRIAL RATE: 59 BPM
EKG P AXIS: 50 DEGREES
EKG P-R INTERVAL: 156 MS
EKG Q-T INTERVAL: 378 MS
EKG QRS DURATION: 80 MS
EKG QTC CALCULATION (BAZETT): 374 MS
EKG R AXIS: 42 DEGREES
EKG T AXIS: 16 DEGREES
EKG VENTRICULAR RATE: 59 BPM
EOSINOPHILS ABSOLUTE: 0.27 E9/L (ref 0.05–0.5)
EOSINOPHILS RELATIVE PERCENT: 2.9 % (ref 0–6)
GFR AFRICAN AMERICAN: 15
GFR AFRICAN AMERICAN: 20
GFR NON-AFRICAN AMERICAN: 12 ML/MIN/1.73
GFR NON-AFRICAN AMERICAN: 16 ML/MIN/1.73
GLUCOSE BLD-MCNC: 96 MG/DL (ref 74–99)
GLUCOSE BLD-MCNC: 98 MG/DL (ref 74–99)
HCT VFR BLD CALC: 39.7 % (ref 37–54)
HEMOGLOBIN: 13.8 G/DL (ref 12.5–16.5)
IMMATURE GRANULOCYTES #: 0.12 E9/L
IMMATURE GRANULOCYTES %: 1.3 % (ref 0–5)
LYMPHOCYTES ABSOLUTE: 1.23 E9/L (ref 1.5–4)
LYMPHOCYTES RELATIVE PERCENT: 13.3 % (ref 20–42)
MAGNESIUM: 1.9 MG/DL (ref 1.6–2.6)
MCH RBC QN AUTO: 34.1 PG (ref 26–35)
MCHC RBC AUTO-ENTMCNC: 34.8 % (ref 32–34.5)
MCV RBC AUTO: 98 FL (ref 80–99.9)
MONOCYTES ABSOLUTE: 1.32 E9/L (ref 0.1–0.95)
MONOCYTES RELATIVE PERCENT: 14.2 % (ref 2–12)
NEUTROPHILS ABSOLUTE: 6.28 E9/L (ref 1.8–7.3)
NEUTROPHILS RELATIVE PERCENT: 67.8 % (ref 43–80)
PDW BLD-RTO: 13.3 FL (ref 11.5–15)
PHOSPHORUS: 5.1 MG/DL (ref 2.5–4.5)
PLATELET # BLD: 127 E9/L (ref 130–450)
PMV BLD AUTO: 10.3 FL (ref 7–12)
POTASSIUM SERPL-SCNC: 4.4 MMOL/L (ref 3.5–5)
POTASSIUM SERPL-SCNC: 5.1 MMOL/L (ref 3.5–5)
RBC # BLD: 4.05 E12/L (ref 3.8–5.8)
SODIUM BLD-SCNC: 138 MMOL/L (ref 132–146)
SODIUM BLD-SCNC: 141 MMOL/L (ref 132–146)
TOTAL CK: 2365 U/L (ref 20–200)
TOTAL PROTEIN: 5.6 G/DL (ref 6.4–8.3)
VANCOMYCIN RANDOM: 20.9 MCG/ML (ref 5–40)
WBC # BLD: 9.3 E9/L (ref 4.5–11.5)

## 2020-06-27 PROCEDURE — 99232 SBSQ HOSP IP/OBS MODERATE 35: CPT | Performed by: SURGERY

## 2020-06-27 PROCEDURE — 6360000002 HC RX W HCPCS: Performed by: INTERNAL MEDICINE

## 2020-06-27 PROCEDURE — 1200000000 HC SEMI PRIVATE

## 2020-06-27 PROCEDURE — 2580000003 HC RX 258: Performed by: INTERNAL MEDICINE

## 2020-06-27 PROCEDURE — 80202 ASSAY OF VANCOMYCIN: CPT

## 2020-06-27 PROCEDURE — 6360000002 HC RX W HCPCS: Performed by: EMERGENCY MEDICINE

## 2020-06-27 PROCEDURE — 83735 ASSAY OF MAGNESIUM: CPT

## 2020-06-27 PROCEDURE — 36415 COLL VENOUS BLD VENIPUNCTURE: CPT

## 2020-06-27 PROCEDURE — 99233 SBSQ HOSP IP/OBS HIGH 50: CPT | Performed by: INTERNAL MEDICINE

## 2020-06-27 PROCEDURE — 80053 COMPREHEN METABOLIC PANEL: CPT

## 2020-06-27 PROCEDURE — 80048 BASIC METABOLIC PNL TOTAL CA: CPT

## 2020-06-27 PROCEDURE — 90935 HEMODIALYSIS ONE EVALUATION: CPT | Performed by: INTERNAL MEDICINE

## 2020-06-27 PROCEDURE — 6370000000 HC RX 637 (ALT 250 FOR IP): Performed by: INTERNAL MEDICINE

## 2020-06-27 PROCEDURE — 84100 ASSAY OF PHOSPHORUS: CPT

## 2020-06-27 PROCEDURE — 72192 CT PELVIS W/O DYE: CPT

## 2020-06-27 PROCEDURE — 85025 COMPLETE CBC W/AUTO DIFF WBC: CPT

## 2020-06-27 PROCEDURE — 82550 ASSAY OF CK (CPK): CPT

## 2020-06-27 PROCEDURE — 93010 ELECTROCARDIOGRAM REPORT: CPT | Performed by: INTERNAL MEDICINE

## 2020-06-27 RX ADMIN — SODIUM CHLORIDE: 9 INJECTION, SOLUTION INTRAVENOUS at 01:53

## 2020-06-27 RX ADMIN — FENTANYL CITRATE 50 MCG: 50 INJECTION, SOLUTION INTRAMUSCULAR; INTRAVENOUS at 01:12

## 2020-06-27 RX ADMIN — FENTANYL CITRATE 50 MCG: 50 INJECTION, SOLUTION INTRAMUSCULAR; INTRAVENOUS at 05:46

## 2020-06-27 RX ADMIN — FENTANYL CITRATE 50 MCG: 50 INJECTION, SOLUTION INTRAMUSCULAR; INTRAVENOUS at 14:47

## 2020-06-27 RX ADMIN — PIPERACILLIN AND TAZOBACTAM 3.38 G: 3; .375 INJECTION, POWDER, FOR SOLUTION INTRAVENOUS at 10:17

## 2020-06-27 RX ADMIN — FENTANYL CITRATE 50 MCG: 50 INJECTION, SOLUTION INTRAMUSCULAR; INTRAVENOUS at 07:53

## 2020-06-27 RX ADMIN — FENTANYL CITRATE 50 MCG: 50 INJECTION, SOLUTION INTRAMUSCULAR; INTRAVENOUS at 03:28

## 2020-06-27 RX ADMIN — PIPERACILLIN AND TAZOBACTAM 3.38 G: 3; .375 INJECTION, POWDER, FOR SOLUTION INTRAVENOUS at 21:29

## 2020-06-27 RX ADMIN — HEPARIN SODIUM 5000 UNITS: 5000 INJECTION, SOLUTION INTRAVENOUS; SUBCUTANEOUS at 08:23

## 2020-06-27 RX ADMIN — Medication 10 ML: at 20:09

## 2020-06-27 RX ADMIN — SODIUM CHLORIDE: 9 INJECTION, SOLUTION INTRAVENOUS at 01:09

## 2020-06-27 RX ADMIN — SODIUM CHLORIDE: 9 INJECTION, SOLUTION INTRAVENOUS at 14:47

## 2020-06-27 RX ADMIN — FENTANYL CITRATE 50 MCG: 50 INJECTION, SOLUTION INTRAMUSCULAR; INTRAVENOUS at 18:43

## 2020-06-27 RX ADMIN — SODIUM CHLORIDE: 9 INJECTION, SOLUTION INTRAVENOUS at 20:09

## 2020-06-27 RX ADMIN — FENTANYL CITRATE 50 MCG: 50 INJECTION, SOLUTION INTRAMUSCULAR; INTRAVENOUS at 23:58

## 2020-06-27 RX ADMIN — FENTANYL CITRATE 50 MCG: 50 INJECTION, SOLUTION INTRAMUSCULAR; INTRAVENOUS at 21:24

## 2020-06-27 RX ADMIN — FENTANYL CITRATE 50 MCG: 50 INJECTION, SOLUTION INTRAMUSCULAR; INTRAVENOUS at 12:26

## 2020-06-27 RX ADMIN — Medication 10 ML: at 09:00

## 2020-06-27 RX ADMIN — FENTANYL CITRATE 50 MCG: 50 INJECTION, SOLUTION INTRAMUSCULAR; INTRAVENOUS at 10:22

## 2020-06-27 RX ADMIN — VANCOMYCIN HYDROCHLORIDE 750 MG: 1 INJECTION, POWDER, LYOPHILIZED, FOR SOLUTION INTRAVENOUS at 17:04

## 2020-06-27 RX ADMIN — HEPARIN SODIUM 5000 UNITS: 5000 INJECTION, SOLUTION INTRAVENOUS; SUBCUTANEOUS at 21:24

## 2020-06-27 ASSESSMENT — PAIN DESCRIPTION - ONSET
ONSET: ON-GOING

## 2020-06-27 ASSESSMENT — PAIN DESCRIPTION - ORIENTATION
ORIENTATION: LEFT

## 2020-06-27 ASSESSMENT — PAIN DESCRIPTION - LOCATION
LOCATION: BUTTOCKS;HIP
LOCATION: BUTTOCKS;HIP
LOCATION: HIP
LOCATION: BUTTOCKS;HIP
LOCATION: BUTTOCKS;HIP

## 2020-06-27 ASSESSMENT — PAIN DESCRIPTION - PROGRESSION
CLINICAL_PROGRESSION: GRADUALLY IMPROVING
CLINICAL_PROGRESSION: GRADUALLY WORSENING
CLINICAL_PROGRESSION: GRADUALLY WORSENING
CLINICAL_PROGRESSION: NOT CHANGED
CLINICAL_PROGRESSION: GRADUALLY IMPROVING

## 2020-06-27 ASSESSMENT — PAIN SCALES - GENERAL
PAINLEVEL_OUTOF10: 7
PAINLEVEL_OUTOF10: 5
PAINLEVEL_OUTOF10: 7
PAINLEVEL_OUTOF10: 4
PAINLEVEL_OUTOF10: 7
PAINLEVEL_OUTOF10: 6
PAINLEVEL_OUTOF10: 7
PAINLEVEL_OUTOF10: 8
PAINLEVEL_OUTOF10: 7

## 2020-06-27 ASSESSMENT — PAIN DESCRIPTION - PAIN TYPE
TYPE: ACUTE PAIN

## 2020-06-27 ASSESSMENT — PAIN DESCRIPTION - FREQUENCY
FREQUENCY: CONTINUOUS

## 2020-06-27 ASSESSMENT — PAIN DESCRIPTION - DESCRIPTORS
DESCRIPTORS: SORE
DESCRIPTORS: SORE
DESCRIPTORS: DISCOMFORT
DESCRIPTORS: SORE

## 2020-06-27 ASSESSMENT — PAIN - FUNCTIONAL ASSESSMENT: PAIN_FUNCTIONAL_ASSESSMENT: PREVENTS OR INTERFERES SOME ACTIVE ACTIVITIES AND ADLS

## 2020-06-27 NOTE — PROGRESS NOTES
Progress Note  6/27/2020 11:21 AM  Subjective:   Admit Date: 6/25/2020  PCP: Alejo Heredia,   Interval History: Patient examined doing well feels ok     Diet: DIET RENAL;    Data:   Scheduled Meds:   nicotine  1 patch Transdermal Daily    sodium chloride flush  10 mL Intravenous 2 times per day    heparin (porcine)  5,000 Units Subcutaneous BID    vancomycin (VANCOCIN) intermittent dosing (placeholder)   Other RX Placeholder    piperacillin-tazobactam  3.375 g Intravenous Q12H     Continuous Infusions:   dextrose      sodium chloride 70 mL/hr at 06/27/20 0800    sodium chloride Stopped (06/27/20 0354)     PRN Meds:fentanNYL, ondansetron, glucose, dextrose, glucagon (rDNA), dextrose, sodium chloride flush, acetaminophen **OR** acetaminophen, polyethylene glycol, promethazine **OR** ondansetron  I/O last 3 completed shifts: In: 9111 [I.V.:1604; IV Piggyback:50]  Out: 2333 [Urine:1450]  I/O this shift:  In: 290 [P.O.:240; IV Piggyback:50]  Out: 400 [Urine:400]    Intake/Output Summary (Last 24 hours) at 6/27/2020 1121  Last data filed at 6/27/2020 1017  Gross per 24 hour   Intake 1944 ml   Output 1850 ml   Net 94 ml     CBC:   Recent Labs     06/25/20  1253 06/26/20  0539 06/27/20  0534   WBC 8.5 7.3 9.3   HGB 14.9 13.3 13.8   * 125* 127*     BMP:    Recent Labs     06/26/20  0539 06/26/20  2241 06/27/20  0534     137 138 141   K 4.6  4.5 5.1* 5.1*   CL 96*  97* 102 103   CO2 26 25 25 25   BUN 65*  66* 40* 43*   CREATININE 6.7*  6.6* 4.8* 5.1*   GLUCOSE 96  90 109* 98     Hepatic:   Recent Labs     06/26/20  0539 06/27/20  0534   * 131*   * 199*   BILITOT 0.5 0.6   ALKPHOS 64 59     Troponin: No results for input(s): TROPONINI in the last 72 hours. BNP: No results for input(s): BNP in the last 72 hours. Lipids: No results for input(s): CHOL, HDL in the last 72 hours.     Invalid input(s): LDLCALCU  ABGs: No results found for: PHART, PO2ART, [id number]  INR:   Recent Labs 06/25/20  1513   INR 0.9       -----------------------------------------------------------------  RAD: Ct Pelvis Wo Contrast Additional Contrast? None    Result Date: 6/25/2020  EXAMINATION: CT OF THE PELVIS WITHOUT CONTRAST, 6/25/2020 4:41 pm TECHNIQUE: CT of the pelvis was performed without the administration of intravenous contrast.  Multiplanar reformatted images are provided for review. Dose modulation, iterative reconstruction, and/or weight based adjustment of the mA/kV was utilized to reduce the radiation dose to as low as reasonably achievable. COMPARISON: None HISTORY: ORDERING SYSTEM PROVIDED HISTORY: Possible left buttock abscess. TECHNOLOGIST PROVIDED HISTORY: Reason for exam:->Possible left buttock abscess. Additional Contrast?->None FINDINGS: Evaluation is limited by motion. No free fluid within the pelvis. No bladder calculus. No evidence of bowel obstruction. Subcutaneous inflammatory changes along the left buttock region. No focal drainable subcutaneous fluid collection. Left gluteus beverly muscle is enlarged. Degenerative changes within the lower lumbar spine. Hip joint spaces are grossly preserved. Limited evaluation without intravenous contrast. Left buttock inflammatory changes. No subcutaneous discrete fluid collection. Enlarged left gluteus beverly muscle likely related to infection. An intramuscular abscess is not excluded. Ir Dean Irene Device Plmt/replace/removal    Result Date: 6/25/2020  PROCEDURE: IR FLUOROSCOPY GUIDED CENTRAL VENOUS ACCESS DEVICE PLACEMENT; US GUIDED VASCULAR ACCESS MODERATE CONSCIOUS SEDATION 6/25/2020 HISTORY: ORDERING SYSTEM PROVIDED HISTORY: temporary dialysis catheter placement TECHNOLOGIST PROVIDED HISTORY: Reason for exam:->temporary dialysis catheter placement renal failure with elevated creatinine and potassium. History of drug use.  TECHNIQUE: Ultrasound guided insertion of a temporary hemodialysis catheter via the right internal jugular vein. CONTRAST: No iodinated contrast was utilized during the study. SEDATION: No intravenous sedation. FLUOROSCOPY DOSE AND TYPE OR TIME AND EXPOSURES: 0.4 minutes of fluoroscopy time, DAP 4558 mGy cm squared. Air Kerma 12 mGy. DESCRIPTION OF PROCEDURE: I discussed the alternatives, benefits and risks of the procedure with Mr. Tricia Villegas; all of his questions were answered and he consents. We discussed the nature of kidney function, renal failure, and the need for hemodialysis currently. The patient was placed supine on the angiography table and patient identification time out was performed. The right side of the neck was evaluated, the internal jugular vein is widely patent and images were obtained; an appropriate area of the skin was marked, the skin was prepared and draped with sterile technique. Maximum sterile barrier technique was utilized. The skin and subcutaneous tissues were anesthetized with 1% lidocaine using a 25 gauge needle. A 3 mm skin nick was made. Using ultrasound guidance, a 21 gauge needle was placed into the right internal jugular vein and there was blood return. A 0.018 inch guidewire was inserted through the needle, fluoroscopy confirms satisfactory position into the superior vena cava. The needle was exchanged for a micropuncture kit. The wire was advanced to the SVC/RA junction and the appropriate length for a temporary hemodialysis catheter was measured at 20 cm. The catheter was exchanged over a 0.035 inch Bentson guidewire, the tract was dilated and a 14 [place] by 20 cm dual lumen hemodialysis catheter was inserted until its tip was in the central superior vena cava, near the junction with the right atrium. There was low resistance aspiration and injection of fluid, satisfactory flow for hemodialysis. Each lumen was flushed with 10 mL of heparinized saline then instilled with heparin 1000 units/mL: 1.5 mL into the \"arterial\" red lumen and 1.6 mL into the blue \"venous\" lumen. The catheter was sutured into place using 2 stitches of 2-0 Ethilon. There are no signs of swelling or bleeding at the puncture site. A dry sterile dressing was applied. Follow-up radiograph demonstrates satisfactory positioning of the catheter with the tip overlying the SVC/RA junction. No tortuosity or kinking detected. Mr. Yazmin Frias tolerated the procedure well and was transferred in satisfactory condition. FINDINGS: Widely patent right internal jugular vein. Follow-up fluoroscopic spot image demonstrates satisfactory positioning of the 14 [place] by 20 cm temporary hemodialysis catheter. Satisfactory insertion of a temporary hemodialysis catheter; the catheter is ready for use. Ir Ultrasound Guidance Vascular Access    Result Date: 6/25/2020  PROCEDURE: IR FLUOROSCOPY GUIDED CENTRAL VENOUS ACCESS DEVICE PLACEMENT; US GUIDED VASCULAR ACCESS MODERATE CONSCIOUS SEDATION 6/25/2020 HISTORY: ORDERING SYSTEM PROVIDED HISTORY: temporary dialysis catheter placement TECHNOLOGIST PROVIDED HISTORY: Reason for exam:->temporary dialysis catheter placement renal failure with elevated creatinine and potassium. History of drug use. TECHNIQUE: Ultrasound guided insertion of a temporary hemodialysis catheter via the right internal jugular vein. CONTRAST: No iodinated contrast was utilized during the study. SEDATION: No intravenous sedation. FLUOROSCOPY DOSE AND TYPE OR TIME AND EXPOSURES: 0.4 minutes of fluoroscopy time, DAP 4558 mGy cm squared. Air Kerma 12 mGy. DESCRIPTION OF PROCEDURE: I discussed the alternatives, benefits and risks of the procedure with Mr. Yazmin Frias; all of his questions were answered and he consents. We discussed the nature of kidney function, renal failure, and the need for hemodialysis currently. The patient was placed supine on the angiography table and patient identification time out was performed.   The right side of the neck was evaluated, the internal jugular vein is widely patent and images time.       Haroon Mercedes

## 2020-06-27 NOTE — PROGRESS NOTES
renal disease; presenting in ARF secondary to rhabdo  · Serum creatinine today: 5.1; CrCl ~ 18 mL/min; baseline Scr ~ 0.9  · 6/25: nephrology consulted; patient receiving 3 hours HD  · 6/26: Pre-dialysis level = 14.7 mcg/mL; receiving 3 hours HD. Patient is producing urine as well  · 6/27: Pre-dialysis level 20.9 mcg/mL; receiving another 3 hours dialysis today.     Plan:  · Vancomycin 750 mg one time; administer over last hour of dialysis  · Repeat random level tomorrow with morning labs  · Follow renal function  · Pharmacist will follow HD schedule and monitor/adjust dosing as necessary      Thank you for the consult,    Mary Collier, PharmD 6/27/2020 11:10 AM   [phone]

## 2020-06-27 NOTE — PROGRESS NOTES
[address] [place], [place] [address]ist   Progress Note    Admitting Date and Time: 6/25/2020 10:54 AM  Admit Dx: Acute renal failure (ARF) (HCC) [N17.9]  Acute renal failure (ARF) (HCC) [N17.9]    Subjective:    Pt feels well this morning. Still has soreness of his left buttock. Per general surgery repeat CT pelvis ordered. Creatinine 5.1, potassium 5.1 this morning. Patient to be transferred to floor. ROS: denies fever, chills, cp, sob, n/v, HA unless stated above.      nicotine  1 patch Transdermal Daily    sodium chloride flush  10 mL Intravenous 2 times per day    heparin (porcine)  5,000 Units Subcutaneous BID    vancomycin (VANCOCIN) intermittent dosing (placeholder)   Other RX Placeholder    piperacillin-tazobactam  3.375 g Intravenous Q12H     fentanNYL, 50 mcg, Q2H PRN  ondansetron, 4 mg, Q6H PRN  glucose, 15 g, PRN  dextrose, 12.5 g, PRN  glucagon (rDNA), 1 mg, PRN  dextrose, 100 mL/hr, PRN  sodium chloride flush, 10 mL, PRN  acetaminophen, 650 mg, Q6H PRN    Or  acetaminophen, 650 mg, Q6H PRN  polyethylene glycol, 17 g, Daily PRN  promethazine, 12.5 mg, Q6H PRN    Or  ondansetron, 4 mg, Q6H PRN         Objective:    /88   Pulse 83   Temp 98.6 °F (37 °C) (Oral)   Resp 17   Ht 5' 8\" (1.727 m)   Wt 160 lb 15 oz (73 kg)   SpO2 96%   BMI 24.47 kg/m²   General Appearance: alert and oriented to person, place and time and in no acute distress  Skin: warm and dry, scratches on the back, no rash  Head: normocephalic and atraumatic  Eyes: pupils equal, round, and reactive to light, extraocular eye movements intact, conjunctivae normal  Neck: neck supple and non tender without mass   Pulmonary/Chest: clear to auscultation bilaterally- no wheezes, rales or rhonchi, normal air movement, no respiratory distress  Cardiovascular: normal rate, normal S1 and S2 and no carotid bruits  Abdomen: soft, non-tender, non-distended, normal bowel sounds, no masses or organomegaly  Extremities: left buttock erythematous, indurated, tender  Neurologic: no cranial nerve deficit and speech normal         Recent Labs     06/26/20  0539 06/26/20  2241 06/27/20  0534     137 138 141   K 4.6  4.5 5.1* 5.1*   CL 96*  97* 102 103   CO2 26 25 25 25   BUN 65*  66* 40* 43*   CREATININE 6.7*  6.6* 4.8* 5.1*   GLUCOSE 96  90 109* 98   CALCIUM 8.2*  8.3* 8.0* 8.6       Recent Labs     06/26/20  0539 06/27/20  0534   ALKPHOS 64 59   PROT 5.7* 5.6*   LABALBU 3.2* 3.3*   BILITOT 0.5 0.6   * 131*   * 199*       Recent Labs     06/25/20  1253 06/26/20  0539 06/27/20  0534   WBC 8.5 7.3 9.3   RBC 4.35 3.86 4.05   HGB 14.9 13.3 13.8   HCT 42.6 36.6* 39.7   MCV 97.9 94.8 98.0   MCH 34.3 34.5 34.1   MCHC 35.0* 36.3* 34.8*   RDW 13.1 12.9 13.3   * 125* 127*   MPV 11.4 11.3 10.3       CBC:   Lab Results   Component Value Date    WBC 9.3 06/27/2020    RBC 4.05 06/27/2020    HGB 13.8 06/27/2020    HCT 39.7 06/27/2020    MCV 98.0 06/27/2020    MCH 34.1 06/27/2020    MCHC 34.8 06/27/2020    RDW 13.3 06/27/2020     06/27/2020    MPV 10.3 06/27/2020     BMP:    Lab Results   Component Value Date     06/27/2020    K 5.1 06/27/2020     06/27/2020    CO2 25 06/27/2020    BUN 43 06/27/2020    LABALBU 3.3 06/27/2020    CREATININE 5.1 06/27/2020    CALCIUM 8.6 06/27/2020    GFRAA 15 06/27/2020    LABGLOM 12 06/27/2020    GLUCOSE 98 06/27/2020      CK 5,652     Radiology:   CT PELVIS WO CONTRAST Additional Contrast? None   Final Result   Limited evaluation without intravenous contrast.      Left buttock inflammatory changes. No subcutaneous discrete fluid collection. Enlarged left gluteus beverly muscle likely related to infection. An   intramuscular abscess is not excluded. IR FLUORO GUIDED CVA DEVICE PLMT/REPLACE/REMOVAL   Final Result   Satisfactory insertion of a temporary hemodialysis catheter; the catheter is   ready for use.          IR ULTRASOUND GUIDANCE VASCULAR ACCESS Final Result   Satisfactory insertion of a temporary hemodialysis catheter; the catheter is   ready for use. CT PELVIS WO CONTRAST Additional Contrast? None    (Results Pending)       Assessment/Plan:  Principal Problem:    Acute renal failure (ARF) (HCC)  Active Problems:    Non-traumatic rhabdomyolysis    Hyperkalemia    History of drug use  Resolved Problems:    * No resolved hospital problems. *      1. CHERY  -improved with HD  -urine output  1,450mL yesterday  -nephrology to reassess dialysis need     2. Nontraumatic rhabdomyolysis   -as above     3. Hyperkalemia with EKG changes   -resolved with HD      4. Left buttock erythema and induration   -no clinical signs of abscess per general surgery   -on antibiotic coverage   -repeat CT for comparison per general surgery      5. Drug abuse  -urine toxicology screen, patient is not very forthcoming about use history, states he hasn't used in [place] long time\"   -urine toxicology positive for cocaine and fentanyl   -counseled about drug use     NOTE: This report was transcribed using voice recognition software. Every effort was made to ensure accuracy; however, inadvertent computerized transcription errors may be present.      Electronically signed by Silvia Storey DO on 6/27/2020 at 8:40 AM

## 2020-06-27 NOTE — H&P
General Surgery Progress Note  [facility]    Patient's Name/Date of Birth: Brittni Hanna / 1975    Date: June 27, 2020     Surgeon: Mirella Youssef MD    Chief Complaint: L buttock swelling    Patient Active Problem List   Diagnosis    Acute renal failure (ARF) ([address]    History of drug use    Non-traumatic rhabdomyolysis    Hyperkalemia       Subjective: Buttock feels the same but better overall    Objective:  /78   Pulse 78   Temp 98.6 °F (37 °C) (Oral)   Resp 13   Ht 5' 8\" (1.727 m)   Wt 160 lb 15 oz (73 kg)   SpO2 96%   BMI 24.47 kg/m²   Labs:  Recent Labs     06/25/20  1253 06/26/20  0539 06/27/20  0534   WBC 8.5 7.3 9.3   HGB 14.9 13.3 13.8   HCT 42.6 36.6* 39.7     Lab Results   Component Value Date    CREATININE 5.1 (H) 06/27/2020    BUN 43 (H) 06/27/2020     06/27/2020    K 5.1 (H) 06/27/2020     06/27/2020    CO2 25 06/27/2020     No results for input(s): LIPASE, AMYLASE in the last 72 hours. CBC with Differential:    Lab Results   Component Value Date    WBC 9.3 06/27/2020    RBC 4.05 06/27/2020    HGB 13.8 06/27/2020    HCT 39.7 06/27/2020     06/27/2020    MCV 98.0 06/27/2020    MCH 34.1 06/27/2020    MCHC 34.8 06/27/2020    RDW 13.3 06/27/2020    LYMPHOPCT 13.3 06/27/2020    MONOPCT 14.2 06/27/2020    BASOPCT 0.5 06/27/2020    MONOSABS 1.32 06/27/2020    LYMPHSABS 1.23 06/27/2020    EOSABS 0.27 06/27/2020    BASOSABS 0.05 06/27/2020       General appearance:  NAD  Head: NCAT, PERRLA, EOMI, red conjunctiva  Neck: supple, no masses  Lungs: CTAB, equal chest rise bilateral  Heart: Reg rate  Abdomen: soft, nondistended  Buttock: Persistent edema and induration of buttock without warmth, erythema or fluctuance.   Skin; no lesions  Gu: no cva tenderness  Extremities: extremities normal, atraumatic, no cyanosis or edema      Assessment/Plan:  Brittni Hanna is a 54 y.o. male with L buttock and hip swelling likely from rhabdomyolysis    CT pelvis

## 2020-06-27 NOTE — FLOWSHEET NOTE
06/27/20 1807   Vital Signs   /88   Temp 98.2 °F (36.8 °C)   Pulse 76   Resp 16   Weight 157 lb 13.6 oz (71.6 kg)   Weight Method Bed scale   Percent Weight Change -1.92   Post-Hemodialysis Assessment   Post-Treatment Procedures Blood returned;Catheter capped, clamped and heparinized x 2 ports   Machine Disinfection Process Acid/Vinegar Clean;Exterior Machine Disinfection;Machine Absence of Bleach Machine;Bleach   Rinseback Volume (ml) 300 ml   Total Liters Processed (l/min) 46.1 l/min   Dialyzer Clearance Lightly streaked   Duration of Treatment (minutes) 180 minutes   Heparin amount administered during treatment (units) 0 units   Hemodialysis Intake (ml) 900 ml   Hemodialysis Output (ml) 1400 ml   NET Removed (ml) 500 ml   Tolerated Treatment Good   Patient Response to Treatment Completed prescribed treatment w/o complications. Alert/responsive at tx's end, no c/o.

## 2020-06-27 NOTE — PROGRESS NOTES
Subjective:     CO left buttock pain for the last few days, worse with touch, better with analgesia, associated nausea, no fever, chills, sob      PMH,Social Hx and Family Hx : Reviewed; no changes from initial note      Review of Systems    All other systems reviewed and are otherwise negative. Objective:   Data Review:  Vital Signs: /88   Pulse 83   Temp 98.6 °F (37 °C) (Oral)   Resp 17   Ht 5' 8\" (1.727 m)   Wt 160 lb 15 oz (73 kg)   SpO2 96%   BMI 24.47 kg/m²     24 Hour I&O Review:     Intake/Output Summary (Last 24 hours) at 6/27/2020 0845  Last data filed at 6/27/2020 0830  Gross per 24 hour   Intake 1894 ml   Output 1850 ml   Net 44 ml       Physical Exam   VS: reviewed, trend noted  Constitutional: Alert and oriented. No distress. HENT: No oropharyngeal exudate. No ulceration . NC AT   Eyes: Pupils are equal, round, and reactive to light. Neck: Supple, No LAP. No tracheal deviation present. Cardiovascular: Normal rate, regular rhythm, S1&S2    Pulmonary/Chest: Effort normal .No stridor. No respiratory distress. No wheezes, + bibasilar crackles   Abdominal: Soft. Bowel sounds are normal, ND, no tenderness to palpation. Musculoskeletal: Normal range of motion. Left buttock/tenderness.    Neurological: Non focal. normal speech  Extremities:no edema BL LE no clubbing or cyanosis  Skin: warm and dry  PSY: no depression, normal affect      Continuous Infusions:   dextrose      sodium chloride 70 mL/hr at 06/27/20 0800    sodium chloride Stopped (06/27/20 0354)         Current Medications: Current Facility-Administered Medications: nicotine (NICODERM CQ) 21 MG/24HR 1 patch, 1 patch, Transdermal, Daily  fentaNYL (SUBLIMAZE) injection 50 mcg, 50 mcg, Intravenous, Q2H PRN  ondansetron (ZOFRAN) injection 4 mg, 4 mg, Intravenous, Q6H PRN  glucose (GLUTOSE) 40 % oral gel 15 g, 15 g, Oral, PRN  dextrose 50 % IV solution, 12.5 g, Intravenous, PRN  glucagon (rDNA) injection 1 mg, 1 mg, Intramuscular, PRN  dextrose 5 % solution, 100 mL/hr, Intravenous, PRN  sodium chloride flush 0.9 % injection 10 mL, 10 mL, Intravenous, 2 times per day  sodium chloride flush 0.9 % injection 10 mL, 10 mL, Intravenous, PRN  acetaminophen (TYLENOL) tablet 650 mg, 650 mg, Oral, Q6H PRN **OR** acetaminophen (TYLENOL) suppository 650 mg, 650 mg, Rectal, Q6H PRN  polyethylene glycol (GLYCOLAX) packet 17 g, 17 g, Oral, Daily PRN  promethazine (PHENERGAN) tablet 12.5 mg, 12.5 mg, Oral, Q6H PRN **OR** ondansetron (ZOFRAN) injection 4 mg, 4 mg, Intravenous, Q6H PRN  heparin (porcine) injection 5,000 Units, 5,000 Units, Subcutaneous, BID  0.9 % sodium chloride infusion, , Intravenous, Continuous  vancomycin (VANCOCIN) intermittent dosing (placeholder), , Other, RX Placeholder  piperacillin-tazobactam (ZOSYN) 3.375 g in dextrose 5 % 50 mL IVPB extended infusion (mini-bag), 3.375 g, Intravenous, Q12H **AND** 0.9 % sodium chloride infusion, , Intravenous, Q12H    Ventilator Settings:Vent Information  SpO2: 96 %   CBC:  Recent Labs     06/25/20  1253 06/26/20  0539 06/27/20  0534   WBC 8.5 7.3 9.3   RBC 4.35 3.86 4.05   HGB 14.9 13.3 13.8   HCT 42.6 36.6* 39.7   * 125* 127*   MCV 97.9 94.8 98.0   MCH 34.3 34.5 34.1   MCHC 35.0* 36.3* 34.8*   RDW 13.1 12.9 13.3      BMP:  Recent Labs     06/26/20  0539 06/26/20  2241 06/27/20  0534     137 138 141   K 4.6  4.5 5.1* 5.1*   CL 96*  97* 102 103   CO2 26  25 25 25   BUN 65*  66* 40* 43*   CREATININE 6.7*  6.6* 4.8* 5.1*   CALCIUM 8.2*  8.3* 8.0* 8.6   GLUCOSE 96  90 109* 98      ABG:  No results found for: PH, PCO2, PO2, HCO3, O2SAT    Cultures:  Recent Labs     06/25/20  1253   BC 24 Hours no growth     Recent Labs     06/25/20  1253   BLOODCULT2 24 Hours no growth     No results for input(s): CULTRESP in the last 72 hours. Radiology Review:  Pertinent images / reports were reviewed as a part of this visit.    CT PELVIS WO CONTRAST Additional Contrast? None Final Result   Limited evaluation without intravenous contrast.      Left buttock inflammatory changes. No subcutaneous discrete fluid collection. Enlarged left gluteus bevrely muscle likely related to infection. An   intramuscular abscess is not excluded. IR FLUORO GUIDED CVA DEVICE PLMT/REPLACE/REMOVAL   Final Result   Satisfactory insertion of a temporary hemodialysis catheter; the catheter is   ready for use. IR ULTRASOUND GUIDANCE VASCULAR ACCESS   Final Result   Satisfactory insertion of a temporary hemodialysis catheter; the catheter is   ready for use.          CT PELVIS WO CONTRAST Additional Contrast? None    (Results Pending)       Other Diagnostic Testing:      Assessment:   Rhabdomyolysis  Acute kidney failure requiring HD  Hyperkalemia  Left buttock pain/erythema 2/2 contusion, RO abscess      Plan:     Monitor UO, RFP  FU nephrology recs  Cont abx renally dose  FU surg recs    Discussed with RN re management        Electronically signed by Tonya Eli MD on 6/27/2020 at 8:45 AM

## 2020-06-27 NOTE — PROGRESS NOTES
Patients mom (cherelle) called in very upset that her son was in icu and that no physician had called her to provide her with an update regarding his failing kidneys. I explained to her about confidentiality and that her son was 54 yr/old and  has a phone available and could call her to provide her with an update whenever he wants. Sergio Johnston requested at that time to be transferred into the patients room to speak with him.

## 2020-06-28 LAB
ALBUMIN SERPL-MCNC: 3 G/DL (ref 3.5–5.2)
ALP BLD-CCNC: 54 U/L (ref 40–129)
ALT SERPL-CCNC: 139 U/L (ref 0–40)
ANION GAP SERPL CALCULATED.3IONS-SCNC: 12 MMOL/L (ref 7–16)
ANION GAP SERPL CALCULATED.3IONS-SCNC: 13 MMOL/L (ref 7–16)
AST SERPL-CCNC: 77 U/L (ref 0–39)
BASOPHILS ABSOLUTE: 0.08 E9/L (ref 0–0.2)
BASOPHILS RELATIVE PERCENT: 0.9 % (ref 0–2)
BILIRUB SERPL-MCNC: 0.5 MG/DL (ref 0–1.2)
BUN BLDV-MCNC: 37 MG/DL (ref 6–20)
BUN BLDV-MCNC: 43 MG/DL (ref 6–20)
CALCIUM SERPL-MCNC: 8.4 MG/DL (ref 8.6–10.2)
CALCIUM SERPL-MCNC: 8.5 MG/DL (ref 8.6–10.2)
CHLORIDE BLD-SCNC: 101 MMOL/L (ref 98–107)
CHLORIDE BLD-SCNC: 103 MMOL/L (ref 98–107)
CO2: 21 MMOL/L (ref 22–29)
CO2: 24 MMOL/L (ref 22–29)
COMPLEMENT ACTIVITY, TOTAL TURBIDIMETRIC: 78.6 U/ML (ref 38.7–89.9)
CREAT SERPL-MCNC: 4.6 MG/DL (ref 0.7–1.2)
CREAT SERPL-MCNC: 4.6 MG/DL (ref 0.7–1.2)
EOSINOPHILS ABSOLUTE: 0.33 E9/L (ref 0.05–0.5)
EOSINOPHILS RELATIVE PERCENT: 3.6 % (ref 0–6)
GFR AFRICAN AMERICAN: 17
GFR AFRICAN AMERICAN: 17
GFR NON-AFRICAN AMERICAN: 14 ML/MIN/1.73
GFR NON-AFRICAN AMERICAN: 14 ML/MIN/1.73
GLUCOSE BLD-MCNC: 125 MG/DL (ref 74–99)
GLUCOSE BLD-MCNC: 85 MG/DL (ref 74–99)
HCT VFR BLD CALC: 40.3 % (ref 37–54)
HEMOGLOBIN: 13.4 G/DL (ref 12.5–16.5)
IMMATURE GRANULOCYTES #: 0.15 E9/L
IMMATURE GRANULOCYTES %: 1.6 % (ref 0–5)
LYMPHOCYTES ABSOLUTE: 1.33 E9/L (ref 1.5–4)
LYMPHOCYTES RELATIVE PERCENT: 14.5 % (ref 20–42)
MAGNESIUM: 1.8 MG/DL (ref 1.6–2.6)
MCH RBC QN AUTO: 34 PG (ref 26–35)
MCHC RBC AUTO-ENTMCNC: 33.3 % (ref 32–34.5)
MCV RBC AUTO: 102.3 FL (ref 80–99.9)
METER GLUCOSE: 112 MG/DL (ref 74–99)
METER GLUCOSE: 123 MG/DL (ref 74–99)
METER GLUCOSE: 194 MG/DL (ref 74–99)
MONOCYTES ABSOLUTE: 1.13 E9/L (ref 0.1–0.95)
MONOCYTES RELATIVE PERCENT: 12.3 % (ref 2–12)
MYOGLOBIN URINE: <1 MG/L (ref 0–1)
NEUTROPHILS ABSOLUTE: 6.18 E9/L (ref 1.8–7.3)
NEUTROPHILS RELATIVE PERCENT: 67.1 % (ref 43–80)
PDW BLD-RTO: 13.5 FL (ref 11.5–15)
PHOSPHORUS: 4.8 MG/DL (ref 2.5–4.5)
PLATELET # BLD: 152 E9/L (ref 130–450)
PMV BLD AUTO: 11.3 FL (ref 7–12)
POTASSIUM SERPL-SCNC: 4.4 MMOL/L (ref 3.5–5)
POTASSIUM SERPL-SCNC: 4.7 MMOL/L (ref 3.5–5)
RBC # BLD: 3.94 E12/L (ref 3.8–5.8)
REASON FOR REJECTION: NORMAL
REJECTED TEST: NORMAL
SODIUM BLD-SCNC: 137 MMOL/L (ref 132–146)
SODIUM BLD-SCNC: 137 MMOL/L (ref 132–146)
TOTAL CK: 820 U/L (ref 20–200)
TOTAL PROTEIN: 5.2 G/DL (ref 6.4–8.3)
VANCOMYCIN RANDOM: 15.2 MCG/ML (ref 5–40)
WBC # BLD: 9.2 E9/L (ref 4.5–11.5)

## 2020-06-28 PROCEDURE — 6360000002 HC RX W HCPCS: Performed by: EMERGENCY MEDICINE

## 2020-06-28 PROCEDURE — 97161 PT EVAL LOW COMPLEX 20 MIN: CPT | Performed by: PHYSICAL THERAPIST

## 2020-06-28 PROCEDURE — 2580000003 HC RX 258: Performed by: INTERNAL MEDICINE

## 2020-06-28 PROCEDURE — 82550 ASSAY OF CK (CPK): CPT

## 2020-06-28 PROCEDURE — 83735 ASSAY OF MAGNESIUM: CPT

## 2020-06-28 PROCEDURE — 36415 COLL VENOUS BLD VENIPUNCTURE: CPT

## 2020-06-28 PROCEDURE — 6370000000 HC RX 637 (ALT 250 FOR IP): Performed by: INTERNAL MEDICINE

## 2020-06-28 PROCEDURE — 80048 BASIC METABOLIC PNL TOTAL CA: CPT

## 2020-06-28 PROCEDURE — 85025 COMPLETE CBC W/AUTO DIFF WBC: CPT

## 2020-06-28 PROCEDURE — 80202 ASSAY OF VANCOMYCIN: CPT

## 2020-06-28 PROCEDURE — 6360000002 HC RX W HCPCS: Performed by: INTERNAL MEDICINE

## 2020-06-28 PROCEDURE — 1200000000 HC SEMI PRIVATE

## 2020-06-28 PROCEDURE — 82962 GLUCOSE BLOOD TEST: CPT

## 2020-06-28 PROCEDURE — 84100 ASSAY OF PHOSPHORUS: CPT

## 2020-06-28 PROCEDURE — 99233 SBSQ HOSP IP/OBS HIGH 50: CPT | Performed by: INTERNAL MEDICINE

## 2020-06-28 PROCEDURE — 80053 COMPREHEN METABOLIC PANEL: CPT

## 2020-06-28 RX ORDER — FENTANYL CITRATE 50 UG/ML
50 INJECTION, SOLUTION INTRAMUSCULAR; INTRAVENOUS
Status: DISCONTINUED | OUTPATIENT
Start: 2020-06-28 | End: 2020-07-01

## 2020-06-28 RX ADMIN — HEPARIN SODIUM 5000 UNITS: 5000 INJECTION, SOLUTION INTRAVENOUS; SUBCUTANEOUS at 08:05

## 2020-06-28 RX ADMIN — FENTANYL CITRATE 50 MCG: 50 INJECTION, SOLUTION INTRAMUSCULAR; INTRAVENOUS at 05:34

## 2020-06-28 RX ADMIN — PIPERACILLIN AND TAZOBACTAM 3.38 G: 3; .375 INJECTION, POWDER, FOR SOLUTION INTRAVENOUS at 09:33

## 2020-06-28 RX ADMIN — FENTANYL CITRATE 50 MCG: 50 INJECTION, SOLUTION INTRAMUSCULAR; INTRAVENOUS at 10:19

## 2020-06-28 RX ADMIN — FENTANYL CITRATE 50 MCG: 50 INJECTION, SOLUTION INTRAMUSCULAR; INTRAVENOUS at 08:05

## 2020-06-28 RX ADMIN — FENTANYL CITRATE 50 MCG: 50 INJECTION INTRAMUSCULAR; INTRAVENOUS at 14:17

## 2020-06-28 RX ADMIN — HEPARIN SODIUM 5000 UNITS: 5000 INJECTION, SOLUTION INTRAVENOUS; SUBCUTANEOUS at 21:30

## 2020-06-28 RX ADMIN — FENTANYL CITRATE 50 MCG: 50 INJECTION INTRAMUSCULAR; INTRAVENOUS at 21:30

## 2020-06-28 RX ADMIN — FENTANYL CITRATE 50 MCG: 50 INJECTION INTRAMUSCULAR; INTRAVENOUS at 12:50

## 2020-06-28 RX ADMIN — Medication 10 ML: at 21:31

## 2020-06-28 RX ADMIN — FENTANYL CITRATE 50 MCG: 50 INJECTION INTRAMUSCULAR; INTRAVENOUS at 17:09

## 2020-06-28 RX ADMIN — FENTANYL CITRATE 50 MCG: 50 INJECTION INTRAMUSCULAR; INTRAVENOUS at 11:21

## 2020-06-28 RX ADMIN — FENTANYL CITRATE 50 MCG: 50 INJECTION INTRAMUSCULAR; INTRAVENOUS at 19:27

## 2020-06-28 RX ADMIN — FENTANYL CITRATE 50 MCG: 50 INJECTION INTRAMUSCULAR; INTRAVENOUS at 15:52

## 2020-06-28 RX ADMIN — FENTANYL CITRATE 50 MCG: 50 INJECTION, SOLUTION INTRAMUSCULAR; INTRAVENOUS at 02:59

## 2020-06-28 RX ADMIN — VANCOMYCIN HYDROCHLORIDE 750 MG: 10 INJECTION, POWDER, LYOPHILIZED, FOR SOLUTION INTRAVENOUS at 17:09

## 2020-06-28 RX ADMIN — PIPERACILLIN AND TAZOBACTAM 3.38 G: 3; .375 INJECTION, POWDER, FOR SOLUTION INTRAVENOUS at 21:30

## 2020-06-28 RX ADMIN — FENTANYL CITRATE 50 MCG: 50 INJECTION INTRAMUSCULAR; INTRAVENOUS at 22:53

## 2020-06-28 RX ADMIN — SODIUM CHLORIDE: 9 INJECTION, SOLUTION INTRAVENOUS at 09:34

## 2020-06-28 ASSESSMENT — PAIN SCALES - GENERAL
PAINLEVEL_OUTOF10: 0
PAINLEVEL_OUTOF10: 0
PAINLEVEL_OUTOF10: 7
PAINLEVEL_OUTOF10: 0
PAINLEVEL_OUTOF10: 7
PAINLEVEL_OUTOF10: 8
PAINLEVEL_OUTOF10: 7
PAINLEVEL_OUTOF10: 7
PAINLEVEL_OUTOF10: 0
PAINLEVEL_OUTOF10: 7
PAINLEVEL_OUTOF10: 5
PAINLEVEL_OUTOF10: 7
PAINLEVEL_OUTOF10: 7
PAINLEVEL_OUTOF10: 3
PAINLEVEL_OUTOF10: 7
PAINLEVEL_OUTOF10: 7
PAINLEVEL_OUTOF10: 0
PAINLEVEL_OUTOF10: 3
PAINLEVEL_OUTOF10: 4
PAINLEVEL_OUTOF10: 7
PAINLEVEL_OUTOF10: 7

## 2020-06-28 ASSESSMENT — PAIN DESCRIPTION - ORIENTATION: ORIENTATION: LEFT

## 2020-06-28 ASSESSMENT — PAIN DESCRIPTION - LOCATION: LOCATION: HIP

## 2020-06-28 ASSESSMENT — PAIN DESCRIPTION - PAIN TYPE: TYPE: ACUTE PAIN

## 2020-06-28 NOTE — PROGRESS NOTES
Physical Therapy    Physical Therapy Initial Evaluation    Room #:  3852/2858-60  Patient Name: Renea Ledesma  YOB: 1975  MRN: [id number]    Referring Provider: Pasty Schilder, DO    Date of Service: 6/28/2020    Evaluating Physical Therapist:  Seble Licea, PT  Lic. # [id number]      Diagnosis:   Acute renal failure (ARF) ([facility] [address] [N17.9]  Acute renal failure (ARF) ([facility]) [N17.9]   Admitted with left leg numbness, right arm pain  Hip pain and foot swelling, chills; rhabdomyolysis  Temporary hemodialysis catheter placed    Patient Active Problem List   Diagnosis    Acute renal failure (ARF) ([facility] [address]    History of drug use    Non-traumatic rhabdomyolysis    Hyperkalemia        Tentative placement recommendation: Home    Equipment recommendation: Patient has needed equipment       Prior Level of Function: Patient ambulated independently    Rehab Potential: good  for baseline    Past medical history:   History reviewed. No pertinent past medical history. History reviewed. No pertinent surgical history. Precautions: falls ,      SUBJECTIVE:    Social history: Patient lives with family  in a ranch home  with 4 steps  to enter with [address]: [employer],       [address]   How much difficulty turning over in bed?: None  How much difficulty sitting down on / standing up from a chair with arms?: None  How much difficulty moving from lying on back to sitting on side of bed?: None  How much help from another person moving to and from a bed to a chair?: None  How much help from another person needed to walk in hospital room?: None  How much help from another person for climbing 3-5 steps with a railing?: A Little  AM-PAC Inpatient Mobility Raw Score : 23  AM-PAC Inpatient T-Scale Score : 56.93  Mobility Inpatient CMS 0-100% Score: 11.2  Mobility Inpatient CMS G-Code Modifier : CI    Nursing cleared patient for PT evaluation.  The jake          At end of session, patient in bed with  call light and phone within reach,   all lines and tubes intact, nursing notified. Patient would benefit from continued skilled Physical Therapy to improve functional independence and quality of life. Patient's/ family goals   home        Patient and or family understand(s) diagnosis, prognosis, and plan of care. PLAN:    Physical Therapy care will be provided in accordance with the objectives noted above. Exercises and functional mobility practice will be used as well as appropriate assistive devices or modalities to obtain goals. Patient and family education will also be administered as needed. Frequency of treatments: Patient will be seen   One more time for steps  Time in  1111  Time out  120    Total Treatment Time  0minutes    Evaluation time includes thorough review of current medical information, gathering information on past medical history/social history and prior level of function, completion of standardized testing/informal observation of tasks, assessment of data, and development of Plan of care and goals.      CPT codes:  Low Complexity PT evaluation (18325)  No treatment    Greyson Kolb, PT

## 2020-06-28 NOTE — PLAN OF CARE
Problem: Pain:  Goal: Control of acute pain  Description: Control of acute pain  6/28/2020 1320 by Boone Gardiner RN  Outcome: Met This Shift     Problem: Falls - Risk of:  Goal: Will remain free from falls  Description: Will remain free from falls  6/28/2020 1320 by Boone Gardiner RN  Outcome: Met This Shift

## 2020-06-28 NOTE — PROGRESS NOTES
[address] [place], [place] [address]ist   Progress Note    Admitting Date and Time: 6/25/2020 10:54 AM  Admit Dx: Acute renal failure (ARF) (HCC) [N17.9]  Acute renal failure (ARF) (HCC) [N17.9]    Subjective:    Pt feels about the same this morning. He is having pain in his left buttock, receiving fentanyl q 2 hrs, only lasts for about 1 hour. Dialyzed yesterday, creatinine 4 today. ROS: denies fever, chills, cp, sob, n/v, HA unless stated above.      nicotine  1 patch Transdermal Daily    sodium chloride flush  10 mL Intravenous 2 times per day    heparin (porcine)  5,000 Units Subcutaneous BID    vancomycin (VANCOCIN) intermittent dosing (placeholder)   Other RX Placeholder    piperacillin-tazobactam  3.375 g Intravenous Q12H     fentanNYL, 50 mcg, Q1H PRN  ondansetron, 4 mg, Q6H PRN  glucose, 15 g, PRN  dextrose, 12.5 g, PRN  glucagon (rDNA), 1 mg, PRN  dextrose, 100 mL/hr, PRN  sodium chloride flush, 10 mL, PRN  acetaminophen, 650 mg, Q6H PRN    Or  acetaminophen, 650 mg, Q6H PRN  polyethylene glycol, 17 g, Daily PRN  promethazine, 12.5 mg, Q6H PRN    Or  ondansetron, 4 mg, Q6H PRN         Objective:    /69   Pulse 81   Temp 98 °F (36.7 °C) (Oral)   Resp 18   Ht 5' 8\" (1.727 m)   Wt 151 lb 9.6 oz (68.8 kg)   SpO2 97%   BMI 23.05 kg/m²   General Appearance: alert and oriented to person, place and time and in no acute distress  Skin: warm and dry, scratches on the back, no rash  Head: normocephalic and atraumatic  Eyes: pupils equal, round, and reactive to light, extraocular eye movements intact, conjunctivae normal  Neck: neck supple and non tender without mass   Pulmonary/Chest: clear to auscultation bilaterally- no wheezes, rales or rhonchi, normal air movement, no respiratory distress  Cardiovascular: normal rate, normal S1 and S2 and no carotid bruits  Abdomen: soft, non-tender, non-distended, normal bowel sounds, no masses or organomegaly  Extremities: left buttock erythematous, indurated, tender  Neurologic: no cranial nerve deficit and speech normal         Recent Labs     06/26/20  2241 06/27/20  0534 06/27/20  2258    141 138   K 5.1* 5.1* 4.4    103 101   CO2 25 25 25   BUN 40* 43* 32*   CREATININE 4.8* 5.1* 4.0*   GLUCOSE 109* 98 96   CALCIUM 8.0* 8.6 8.3*       Recent Labs     06/26/20  0539 06/27/20  0534   ALKPHOS 64 59   PROT 5.7* 5.6*   LABALBU 3.2* 3.3*   BILITOT 0.5 0.6   * 131*   * 199*       Recent Labs     06/26/20  0539 06/27/20  0534 06/28/20  0553   WBC 7.3 9.3 9.2   RBC 3.86 4.05 3.94   HGB 13.3 13.8 13.4   HCT 36.6* 39.7 40.3   MCV 94.8 98.0 102.3*   MCH 34.5 34.1 34.0   MCHC 36.3* 34.8* 33.3   RDW 12.9 13.3 13.5   * 127* 152   MPV 11.3 10.3 11.3       CBC:   Lab Results   Component Value Date    WBC 9.2 06/28/2020    RBC 3.94 06/28/2020    HGB 13.4 06/28/2020    HCT 40.3 06/28/2020    .3 06/28/2020    MCH 34.0 06/28/2020    MCHC 33.3 06/28/2020    RDW 13.5 06/28/2020     06/28/2020    MPV 11.3 06/28/2020     BMP:    Lab Results   Component Value Date     06/27/2020    K 4.4 06/27/2020     06/27/2020    CO2 25 06/27/2020    BUN 32 06/27/2020    LABALBU 3.3 06/27/2020    CREATININE 4.0 06/27/2020    CALCIUM 8.3 06/27/2020    GFRAA 20 06/27/2020    LABGLOM 16 06/27/2020    GLUCOSE 96 06/27/2020      CK 5,652     Radiology:   CT PELVIS WO CONTRAST Additional Contrast? None   Final Result   1. No significant change in subcutaneous stranding involving the left flank,   left groin, left hip, left buttock, and proximal left thigh. Considerations   include cellulitis, edema, and hemorrhage. 2. Increased stranding or fluid in the presacral fascia and inferior left   posterior pararenal fascia, likely related to the above process. 3. No significant change in asymmetric enlargement of the left gluteus   beverly muscle potentially due to myositis or intramuscular hematoma.    4. No evident loculated fluid to suggest

## 2020-06-28 NOTE — PROGRESS NOTES
Progress Note  6/28/2020 9:35 AM  Subjective:   Admit Date: 6/25/2020  PCP: Karina Ovalles DO  Interval History: Patient examined doing better feels ok no complains     Diet: DIET RENAL;    Data:   Scheduled Meds:   nicotine  1 patch Transdermal Daily    sodium chloride flush  10 mL Intravenous 2 times per day    heparin (porcine)  5,000 Units Subcutaneous BID    vancomycin (VANCOCIN) intermittent dosing (placeholder)   Other RX Placeholder    piperacillin-tazobactam  3.375 g Intravenous Q12H     Continuous Infusions:   dextrose      sodium chloride 70 mL/hr at 06/28/20 0934    sodium chloride Stopped (06/27/20 1701)     PRN Meds:fentanNYL, ondansetron, glucose, dextrose, glucagon (rDNA), dextrose, sodium chloride flush, acetaminophen **OR** acetaminophen, polyethylene glycol, promethazine **OR** ondansetron  I/O last 3 completed shifts: In: 2065 [P.O.:480; I.V.:585; IV Piggyback:100]  Out: 7290 [Urine:1725]  No intake/output data recorded. Intake/Output Summary (Last 24 hours) at 6/28/2020 0935  Last data filed at 6/28/2020 0533  Gross per 24 hour   Intake 1825 ml   Output 2725 ml   Net -900 ml     CBC:   Recent Labs     06/26/20  0539 06/27/20  0534 06/28/20  0553   WBC 7.3 9.3 9.2   HGB 13.3 13.8 13.4   * 127* 152     BMP:    Recent Labs     06/26/20  2241 06/27/20  0534 06/27/20  2258    141 138   K 5.1* 5.1* 4.4    103 101   CO2 25 25 25   BUN 40* 43* 32*   CREATININE 4.8* 5.1* 4.0*   GLUCOSE 109* 98 96     Hepatic:   Recent Labs     06/26/20  0539 06/27/20  0534   * 131*   * 199*   BILITOT 0.5 0.6   ALKPHOS 64 59     Troponin: No results for input(s): TROPONINI in the last 72 hours. BNP: No results for input(s): BNP in the last 72 hours. Lipids: No results for input(s): CHOL, HDL in the last 72 hours.     Invalid input(s): LDLCALCU  ABGs: No results found for: PHART, PO2ART, PZO0KTE  INR:   Recent Labs     06/25/20  1513   INR 0.9 -----------------------------------------------------------------  RAD: Ct Pelvis Wo Contrast Additional Contrast? None    Result Date: 6/25/2020  EXAMINATION: CT OF THE PELVIS WITHOUT CONTRAST, 6/25/2020 4:41 pm TECHNIQUE: CT of the pelvis was performed without the administration of intravenous contrast.  Multiplanar reformatted images are provided for review. Dose modulation, iterative reconstruction, and/or weight based adjustment of the mA/kV was utilized to reduce the radiation dose to as low as reasonably achievable. COMPARISON: None HISTORY: ORDERING SYSTEM PROVIDED HISTORY: Possible left buttock abscess. TECHNOLOGIST PROVIDED HISTORY: Reason for exam:->Possible left buttock abscess. Additional Contrast?->None FINDINGS: Evaluation is limited by motion. No free fluid within the pelvis. No bladder calculus. No evidence of bowel obstruction. Subcutaneous inflammatory changes along the left buttock region. No focal drainable subcutaneous fluid collection. Left gluteus beverly muscle is enlarged. Degenerative changes within the lower lumbar spine. Hip joint spaces are grossly preserved. Limited evaluation without intravenous contrast. Left buttock inflammatory changes. No subcutaneous discrete fluid collection. Enlarged left gluteus beverly muscle likely related to infection. An intramuscular abscess is not excluded. Ir Monica Fam Device Plmt/replace/removal    Result Date: 6/25/2020  PROCEDURE: IR FLUOROSCOPY GUIDED CENTRAL VENOUS ACCESS DEVICE PLACEMENT; US GUIDED VASCULAR ACCESS MODERATE CONSCIOUS SEDATION 6/25/2020 HISTORY: ORDERING SYSTEM PROVIDED HISTORY: temporary dialysis catheter placement TECHNOLOGIST PROVIDED HISTORY: Reason for exam:->temporary dialysis catheter placement renal failure with elevated creatinine and potassium. History of drug use. TECHNIQUE: Ultrasound guided insertion of a temporary hemodialysis catheter via the right internal jugular vein.  CONTRAST: No iodinated contrast was utilized during the study. SEDATION: No intravenous sedation. FLUOROSCOPY DOSE AND TYPE OR TIME AND EXPOSURES: 0.4 minutes of fluoroscopy time, DAP 4558 mGy cm squared. Air Kerma 12 mGy. DESCRIPTION OF PROCEDURE: I discussed the alternatives, benefits and risks of the procedure with Mr. Anna Serrano; all of his questions were answered and he consents. We discussed the nature of kidney function, renal failure, and the need for hemodialysis currently. The patient was placed supine on the angiography table and patient identification time out was performed. The right side of the neck was evaluated, the internal jugular vein is widely patent and images were obtained; an appropriate area of the skin was marked, the skin was prepared and draped with sterile technique. Maximum sterile barrier technique was utilized. The skin and subcutaneous tissues were anesthetized with 1% lidocaine using a 25 gauge needle. A 3 mm skin nick was made. Using ultrasound guidance, a 21 gauge needle was placed into the right internal jugular vein and there was blood return. A 0.018 inch guidewire was inserted through the needle, fluoroscopy confirms satisfactory position into the superior vena cava. The needle was exchanged for a micropuncture kit. The wire was advanced to the SVC/RA junction and the appropriate length for a temporary hemodialysis catheter was measured at 20 cm. The catheter was exchanged over a 0.035 inch [employer] guidewire, the tract was dilated and a 14 [place] by 20 cm dual lumen hemodialysis catheter was inserted until its tip was in the central superior vena cava, near the junction with the right atrium. There was low resistance aspiration and injection of fluid, satisfactory flow for hemodialysis. Each lumen was flushed with 10 mL of heparinized saline then instilled with heparin 1000 units/mL: 1.5 mL into the \"arterial\" red lumen and 1.6 mL into the blue \"venous\" lumen.   The catheter was sutured into place using 2 stitches of 2-0 Ethilon. There are no signs of swelling or bleeding at the puncture site. A dry sterile dressing was applied. Follow-up radiograph demonstrates satisfactory positioning of the catheter with the tip overlying the SVC/RA junction. No tortuosity or kinking detected. Mr. Parth Cosme tolerated the procedure well and was transferred in satisfactory condition. FINDINGS: Widely patent right internal jugular vein. Follow-up fluoroscopic spot image demonstrates satisfactory positioning of the 14 [place] by 20 cm temporary hemodialysis catheter. Satisfactory insertion of a temporary hemodialysis catheter; the catheter is ready for use. Ir Ultrasound Guidance Vascular Access    Result Date: 6/25/2020  PROCEDURE: IR FLUOROSCOPY GUIDED CENTRAL VENOUS ACCESS DEVICE PLACEMENT; US GUIDED VASCULAR ACCESS MODERATE CONSCIOUS SEDATION 6/25/2020 HISTORY: ORDERING SYSTEM PROVIDED HISTORY: temporary dialysis catheter placement TECHNOLOGIST PROVIDED HISTORY: Reason for exam:->temporary dialysis catheter placement renal failure with elevated creatinine and potassium. History of drug use. TECHNIQUE: Ultrasound guided insertion of a temporary hemodialysis catheter via the right internal jugular vein. CONTRAST: No iodinated contrast was utilized during the study. SEDATION: No intravenous sedation. FLUOROSCOPY DOSE AND TYPE OR TIME AND EXPOSURES: 0.4 minutes of fluoroscopy time, DAP 4558 mGy cm squared. Air Kerma 12 mGy. DESCRIPTION OF PROCEDURE: I discussed the alternatives, benefits and risks of the procedure with Mr. Parth Cosme; all of his questions were answered and he consents. We discussed the nature of kidney function, renal failure, and the need for hemodialysis currently. The patient was placed supine on the angiography table and patient identification time out was performed.   The right side of the neck was evaluated, the internal jugular vein is widely patent and images were obtained; an appropriate area of the skin was marked, the skin was prepared and draped with sterile technique. Maximum sterile barrier technique was utilized. The skin and subcutaneous tissues were anesthetized with 1% lidocaine using a 25 gauge needle. A 3 mm skin nick was made. Using ultrasound guidance, a 21 gauge needle was placed into the right internal jugular vein and there was blood return. A 0.018 inch guidewire was inserted through the needle, fluoroscopy confirms satisfactory position into the superior vena cava. The needle was exchanged for a micropuncture kit. The wire was advanced to the SVC/RA junction and the appropriate length for a temporary hemodialysis catheter was measured at 20 cm. The catheter was exchanged over a 0.035 inch [employer] guidewire, the tract was dilated and a 14 [place] by 20 cm dual lumen hemodialysis catheter was inserted until its tip was in the central superior vena cava, near the junction with the right atrium. There was low resistance aspiration and injection of fluid, satisfactory flow for hemodialysis. Each lumen was flushed with 10 mL of heparinized saline then instilled with heparin 1000 units/mL: 1.5 mL into the \"arterial\" red lumen and 1.6 mL into the blue \"venous\" lumen. The catheter was sutured into place using 2 stitches of 2-0 Ethilon. There are no signs of swelling or bleeding at the puncture site. A dry sterile dressing was applied. Follow-up radiograph demonstrates satisfactory positioning of the catheter with the tip overlying the SVC/RA junction. No tortuosity or kinking detected. Mr. Sofie Kayser tolerated the procedure well and was transferred in satisfactory condition. FINDINGS: Widely patent right internal jugular vein. Follow-up fluoroscopic spot image demonstrates satisfactory positioning of the 14 [place] by 20 cm temporary hemodialysis catheter. Satisfactory insertion of a temporary hemodialysis catheter; the catheter is ready for use.        Objective: Vitals: /69   Pulse 81   Temp 98 °F (36.7 °C) (Oral)   Resp 18   Ht 5' 8\" (1.727 m)   Wt 151 lb 9.6 oz (68.8 kg)   SpO2 97%   BMI 23.05 kg/m²   General appearance: appears stated age   Skin:  No rashes or lesions  HEENT: Head: Normocephalic, no lesions, without obvious abnormality. Neck: no adenopathy, no carotid bruit, no JVD, supple, symmetrical, trachea midline and thyroid not enlarged, symmetric, no tenderness/mass/nodules  Lungs: clear to auscultation bilaterally  Heart: regular rate and rhythm, S1, S2 normal, no murmur, click, rub or gallop  Abdomen: soft, non-tender; bowel sounds normal; no masses,  no organomegaly  Extremities: extremities normal, atraumatic, no cyanosis or edema  Neurologic: Mental status: Alert, oriented, thought content appropriate      Assessment:   Patient Active Problem List:     Acute renal failure (ARF) (HCC)     History of drug use     Non-traumatic rhabdomyolysis     Hyperkalemia    Plan:   1.   Acute kidney injury.  Acute kidney injury secondary to multiple factors including volume depletion in the setting of use of large amount of nonsteroidal anti-inflammatory drugs along with  component of rhabdomyolysis contributing the patient's renal failure.  Patient has had improved urinary output.  Renal numbers still high , GFR at 11-12 cc/min - continue dialysis      2.   Hyperkalemia.  Hyperkalemia secondary to acute kidney injury.  Improved. .     3.   Metabolic acidosis.  Patient with increased anion gap metabolic acidosis with a delta anion gap to delta bicarbonate ratio close to 1.  This was secondary to acute kidney injury.  Bicarbonate levels are now normal..     4.   Rhabdomyolysis.  Rhabdomyolysis most likely due to his cocaine use.  CPK levels lower.     5.  Hyperphosphatemia.  This reflects acute kidney injury and rhabdomyolysis.  improving      Hasit P Malva Ripper

## 2020-06-29 LAB
ALBUMIN SERPL-MCNC: 3 G/DL (ref 3.5–5.2)
ALP BLD-CCNC: 52 U/L (ref 40–129)
ALT SERPL-CCNC: 107 U/L (ref 0–40)
ANCA IFA: NORMAL
ANION GAP SERPL CALCULATED.3IONS-SCNC: 11 MMOL/L (ref 7–16)
ANION GAP SERPL CALCULATED.3IONS-SCNC: 11 MMOL/L (ref 7–16)
ANION GAP SERPL CALCULATED.3IONS-SCNC: 9 MMOL/L (ref 7–16)
ANTI DNA DOUBLE STRANDED: NEGATIVE
ANTI-NUCLEAR ANTIBODY (ANA): NEGATIVE
AST SERPL-CCNC: 54 U/L (ref 0–39)
BASOPHILS ABSOLUTE: 0.08 E9/L (ref 0–0.2)
BASOPHILS RELATIVE PERCENT: 0.8 % (ref 0–2)
BILIRUB SERPL-MCNC: 0.5 MG/DL (ref 0–1.2)
BUN BLDV-MCNC: 27 MG/DL (ref 6–20)
BUN BLDV-MCNC: 45 MG/DL (ref 6–20)
BUN BLDV-MCNC: 47 MG/DL (ref 6–20)
CALCIUM SERPL-MCNC: 8.2 MG/DL (ref 8.6–10.2)
CALCIUM SERPL-MCNC: 8.3 MG/DL (ref 8.6–10.2)
CALCIUM SERPL-MCNC: 8.3 MG/DL (ref 8.6–10.2)
CHLORIDE BLD-SCNC: 100 MMOL/L (ref 98–107)
CHLORIDE BLD-SCNC: 102 MMOL/L (ref 98–107)
CHLORIDE BLD-SCNC: 104 MMOL/L (ref 98–107)
CO2: 22 MMOL/L (ref 22–29)
CO2: 23 MMOL/L (ref 22–29)
CO2: 27 MMOL/L (ref 22–29)
CREAT SERPL-MCNC: 3.6 MG/DL (ref 0.7–1.2)
CREAT SERPL-MCNC: 4.9 MG/DL (ref 0.7–1.2)
CREAT SERPL-MCNC: 5 MG/DL (ref 0.7–1.2)
EOSINOPHILS ABSOLUTE: 0.39 E9/L (ref 0.05–0.5)
EOSINOPHILS RELATIVE PERCENT: 4 % (ref 0–6)
GFR AFRICAN AMERICAN: 15
GFR AFRICAN AMERICAN: 16
GFR AFRICAN AMERICAN: 22
GFR NON-AFRICAN AMERICAN: 13 ML/MIN/1.73
GFR NON-AFRICAN AMERICAN: 13 ML/MIN/1.73
GFR NON-AFRICAN AMERICAN: 18 ML/MIN/1.73
GLUCOSE BLD-MCNC: 105 MG/DL (ref 74–99)
GLUCOSE BLD-MCNC: 137 MG/DL (ref 74–99)
GLUCOSE BLD-MCNC: 93 MG/DL (ref 74–99)
HAV IGM SER IA-ACNC: NORMAL
HBV SURFACE AB TITR SER: NORMAL {TITER}
HCT VFR BLD CALC: 37.7 % (ref 37–54)
HEMOGLOBIN: 12.8 G/DL (ref 12.5–16.5)
HEPATITIS B CORE IGM ANTIBODY: NORMAL
HEPATITIS B SURFACE ANTIGEN INTERPRETATION: NORMAL
HEPATITIS C ANTIBODY INTERPRETATION: NORMAL
IMMATURE GRANULOCYTES #: 0.18 E9/L
IMMATURE GRANULOCYTES %: 1.8 % (ref 0–5)
LYMPHOCYTES ABSOLUTE: 1.31 E9/L (ref 1.5–4)
LYMPHOCYTES RELATIVE PERCENT: 13.4 % (ref 20–42)
MAGNESIUM: 1.7 MG/DL (ref 1.6–2.6)
MCH RBC QN AUTO: 34.2 PG (ref 26–35)
MCHC RBC AUTO-ENTMCNC: 34 % (ref 32–34.5)
MCV RBC AUTO: 100.8 FL (ref 80–99.9)
METER GLUCOSE: 132 MG/DL (ref 74–99)
MONOCYTES ABSOLUTE: 0.95 E9/L (ref 0.1–0.95)
MONOCYTES RELATIVE PERCENT: 9.7 % (ref 2–12)
NEUTROPHILS ABSOLUTE: 6.89 E9/L (ref 1.8–7.3)
NEUTROPHILS RELATIVE PERCENT: 70.3 % (ref 43–80)
PDW BLD-RTO: 13.2 FL (ref 11.5–15)
PHOSPHORUS: 5.7 MG/DL (ref 2.5–4.5)
PLATELET # BLD: 147 E9/L (ref 130–450)
PMV BLD AUTO: 10.6 FL (ref 7–12)
POTASSIUM REFLEX MAGNESIUM: 4.6 MMOL/L (ref 3.5–5)
POTASSIUM SERPL-SCNC: 3.8 MMOL/L (ref 3.5–5)
POTASSIUM SERPL-SCNC: 4.4 MMOL/L (ref 3.5–5)
RBC # BLD: 3.74 E12/L (ref 3.8–5.8)
SODIUM BLD-SCNC: 135 MMOL/L (ref 132–146)
SODIUM BLD-SCNC: 136 MMOL/L (ref 132–146)
SODIUM BLD-SCNC: 138 MMOL/L (ref 132–146)
TOTAL CK: 471 U/L (ref 20–200)
TOTAL PROTEIN: 5.1 G/DL (ref 6.4–8.3)
VANCOMYCIN RANDOM: 17.3 MCG/ML (ref 5–40)
WBC # BLD: 9.8 E9/L (ref 4.5–11.5)

## 2020-06-29 PROCEDURE — 2580000003 HC RX 258: Performed by: INTERNAL MEDICINE

## 2020-06-29 PROCEDURE — 80048 BASIC METABOLIC PNL TOTAL CA: CPT

## 2020-06-29 PROCEDURE — 36415 COLL VENOUS BLD VENIPUNCTURE: CPT

## 2020-06-29 PROCEDURE — 6360000002 HC RX W HCPCS: Performed by: INTERNAL MEDICINE

## 2020-06-29 PROCEDURE — 6370000000 HC RX 637 (ALT 250 FOR IP): Performed by: INTERNAL MEDICINE

## 2020-06-29 PROCEDURE — 80202 ASSAY OF VANCOMYCIN: CPT

## 2020-06-29 PROCEDURE — 85025 COMPLETE CBC W/AUTO DIFF WBC: CPT

## 2020-06-29 PROCEDURE — 97116 GAIT TRAINING THERAPY: CPT

## 2020-06-29 PROCEDURE — 90935 HEMODIALYSIS ONE EVALUATION: CPT

## 2020-06-29 PROCEDURE — 82550 ASSAY OF CK (CPK): CPT

## 2020-06-29 PROCEDURE — 82962 GLUCOSE BLOOD TEST: CPT

## 2020-06-29 PROCEDURE — 83735 ASSAY OF MAGNESIUM: CPT

## 2020-06-29 PROCEDURE — 99233 SBSQ HOSP IP/OBS HIGH 50: CPT | Performed by: INTERNAL MEDICINE

## 2020-06-29 PROCEDURE — 1200000000 HC SEMI PRIVATE

## 2020-06-29 PROCEDURE — 80053 COMPREHEN METABOLIC PANEL: CPT

## 2020-06-29 PROCEDURE — 84100 ASSAY OF PHOSPHORUS: CPT

## 2020-06-29 RX ADMIN — PIPERACILLIN AND TAZOBACTAM 3.38 G: 3; .375 INJECTION, POWDER, FOR SOLUTION INTRAVENOUS at 10:51

## 2020-06-29 RX ADMIN — SODIUM CHLORIDE: 9 INJECTION, SOLUTION INTRAVENOUS at 17:04

## 2020-06-29 RX ADMIN — FENTANYL CITRATE 50 MCG: 50 INJECTION INTRAMUSCULAR; INTRAVENOUS at 12:26

## 2020-06-29 RX ADMIN — HEPARIN SODIUM 5000 UNITS: 5000 INJECTION, SOLUTION INTRAVENOUS; SUBCUTANEOUS at 07:46

## 2020-06-29 RX ADMIN — FENTANYL CITRATE 50 MCG: 50 INJECTION INTRAMUSCULAR; INTRAVENOUS at 07:46

## 2020-06-29 RX ADMIN — SODIUM CHLORIDE: 9 INJECTION, SOLUTION INTRAVENOUS at 00:34

## 2020-06-29 RX ADMIN — VANCOMYCIN HYDROCHLORIDE 750 MG: 5 INJECTION, POWDER, LYOPHILIZED, FOR SOLUTION INTRAVENOUS at 17:38

## 2020-06-29 RX ADMIN — FENTANYL CITRATE 50 MCG: 50 INJECTION INTRAMUSCULAR; INTRAVENOUS at 10:57

## 2020-06-29 RX ADMIN — Medication 10 ML: at 22:14

## 2020-06-29 RX ADMIN — FENTANYL CITRATE 50 MCG: 50 INJECTION INTRAMUSCULAR; INTRAVENOUS at 09:42

## 2020-06-29 RX ADMIN — FENTANYL CITRATE 50 MCG: 50 INJECTION INTRAMUSCULAR; INTRAVENOUS at 22:20

## 2020-06-29 RX ADMIN — FENTANYL CITRATE 50 MCG: 50 INJECTION INTRAMUSCULAR; INTRAVENOUS at 00:34

## 2020-06-29 RX ADMIN — FENTANYL CITRATE 50 MCG: 50 INJECTION INTRAMUSCULAR; INTRAVENOUS at 21:13

## 2020-06-29 RX ADMIN — FENTANYL CITRATE 50 MCG: 50 INJECTION INTRAMUSCULAR; INTRAVENOUS at 23:43

## 2020-06-29 RX ADMIN — FENTANYL CITRATE 50 MCG: 50 INJECTION INTRAMUSCULAR; INTRAVENOUS at 05:45

## 2020-06-29 RX ADMIN — FENTANYL CITRATE 50 MCG: 50 INJECTION INTRAMUSCULAR; INTRAVENOUS at 17:04

## 2020-06-29 RX ADMIN — HEPARIN SODIUM 5000 UNITS: 5000 INJECTION, SOLUTION INTRAVENOUS; SUBCUTANEOUS at 21:12

## 2020-06-29 RX ADMIN — FENTANYL CITRATE 50 MCG: 50 INJECTION INTRAMUSCULAR; INTRAVENOUS at 02:18

## 2020-06-29 RX ADMIN — PIPERACILLIN AND TAZOBACTAM 3.38 G: 3; .375 INJECTION, POWDER, FOR SOLUTION INTRAVENOUS at 21:12

## 2020-06-29 ASSESSMENT — PAIN SCALES - GENERAL
PAINLEVEL_OUTOF10: 6
PAINLEVEL_OUTOF10: 5
PAINLEVEL_OUTOF10: 3
PAINLEVEL_OUTOF10: 7
PAINLEVEL_OUTOF10: 7
PAINLEVEL_OUTOF10: 0
PAINLEVEL_OUTOF10: 7
PAINLEVEL_OUTOF10: 5
PAINLEVEL_OUTOF10: 7
PAINLEVEL_OUTOF10: 5
PAINLEVEL_OUTOF10: 5
PAINLEVEL_OUTOF10: 0
PAINLEVEL_OUTOF10: 0
PAINLEVEL_OUTOF10: 5
PAINLEVEL_OUTOF10: 5

## 2020-06-29 ASSESSMENT — PAIN DESCRIPTION - ONSET
ONSET: ON-GOING
ONSET: ON-GOING

## 2020-06-29 ASSESSMENT — PAIN DESCRIPTION - ORIENTATION
ORIENTATION: LEFT

## 2020-06-29 ASSESSMENT — PAIN DESCRIPTION - PROGRESSION
CLINICAL_PROGRESSION: NOT CHANGED
CLINICAL_PROGRESSION: NOT CHANGED

## 2020-06-29 ASSESSMENT — PAIN DESCRIPTION - FREQUENCY
FREQUENCY: CONTINUOUS

## 2020-06-29 ASSESSMENT — PAIN DESCRIPTION - LOCATION
LOCATION: HIP

## 2020-06-29 ASSESSMENT — PAIN DESCRIPTION - PAIN TYPE
TYPE: ACUTE PAIN

## 2020-06-29 ASSESSMENT — PAIN - FUNCTIONAL ASSESSMENT
PAIN_FUNCTIONAL_ASSESSMENT: PREVENTS OR INTERFERES SOME ACTIVE ACTIVITIES AND ADLS
PAIN_FUNCTIONAL_ASSESSMENT: PREVENTS OR INTERFERES SOME ACTIVE ACTIVITIES AND ADLS

## 2020-06-29 ASSESSMENT — PAIN DESCRIPTION - DESCRIPTORS
DESCRIPTORS: CONSTANT;RADIATING;DISCOMFORT
DESCRIPTORS: CONSTANT;DISCOMFORT

## 2020-06-29 NOTE — PLAN OF CARE
Problem: Pain:  Goal: Pain level will decrease  Description: Pain level will decrease  6/29/2020 1135 by Omari Griffiths RN  Outcome: Met This Shift     Problem: Pain:  Goal: Control of acute pain  Description: Control of acute pain  6/29/2020 1135 by Omari Griffiths RN  Outcome: Met This Shift     Problem: Pain:  Goal: Control of chronic pain  Description: Control of chronic pain  6/29/2020 1135 by Omari Griffiths RN  Outcome: Met This Shift     Problem: Falls - Risk of:  Goal: Will remain free from falls  Description: Will remain free from falls  6/29/2020 1135 by Omari Griffiths RN  Outcome: Met This Shift     Problem: Falls - Risk of:  Goal: Absence of physical injury  Description: Absence of physical injury  6/29/2020 1135 by Omari Griffiths RN  Outcome: Met This Shift

## 2020-06-29 NOTE — PLAN OF CARE
Problem: Pain:  Goal: Pain level will decrease  Description: Pain level will decrease  Outcome: Met This Shift  Goal: Control of acute pain  Description: Control of acute pain  Outcome: Met This Shift  Goal: Control of chronic pain  Description: Control of chronic pain  Outcome: Met This Shift     Problem: Falls - Risk of:  Goal: Will remain free from falls  Description: Will remain free from falls  6/29/2020 0639 by Daisy Viera RN  Outcome: Met This Shift  6/28/2020 2117 by Curtistine Siemens, RN  Outcome: Met This Shift  Goal: Absence of physical injury  Description: Absence of physical injury  6/29/2020 0639 by Daisy Viera RN  Outcome: Met This Shift  6/28/2020 2117 by Curtistine Siemens, RN  Outcome: Met This Shift

## 2020-06-29 NOTE — PLAN OF CARE
Problem: Falls - Risk of:  Goal: Will remain free from falls  Description: Will remain free from falls  6/29/2020 1854 by Mars Cash RN  Outcome: Met This Shift     Problem: Falls - Risk of:  Goal: Absence of physical injury  Description: Absence of physical injury  6/29/2020 1854 by Mars Cash RN  Outcome: Met This Shift

## 2020-06-29 NOTE — PROGRESS NOTES
[address] [place], [place] [address]ist   Progress Note    Admitting Date and Time: 6/25/2020 10:54 AM  Admit Dx: Acute renal failure (ARF) (HCC) [N17.9]  Acute renal failure (ARF) (HCC) [N17.9]    Subjective:    Pt feels about the same this morning. Pain improved with increased frequency of fentanyl. Discussed that after IV antibiotics are discontinued, he may need tunneled dialysis catheter prior to discharge to home for outpatient dialysis. ROS: denies fever, chills, cp, sob, n/v, HA unless stated above.      nicotine  1 patch Transdermal Daily    sodium chloride flush  10 mL Intravenous 2 times per day    heparin (porcine)  5,000 Units Subcutaneous BID    vancomycin (VANCOCIN) intermittent dosing (placeholder)   Other RX Placeholder    piperacillin-tazobactam  3.375 g Intravenous Q12H     fentanNYL, 50 mcg, Q1H PRN  ondansetron, 4 mg, Q6H PRN  glucose, 15 g, PRN  dextrose, 12.5 g, PRN  glucagon (rDNA), 1 mg, PRN  dextrose, 100 mL/hr, PRN  sodium chloride flush, 10 mL, PRN  acetaminophen, 650 mg, Q6H PRN    Or  acetaminophen, 650 mg, Q6H PRN  polyethylene glycol, 17 g, Daily PRN  promethazine, 12.5 mg, Q6H PRN    Or  ondansetron, 4 mg, Q6H PRN         Objective:    /80   Pulse 88   Temp 98 °F (36.7 °C) (Oral)   Resp 16   Ht 5' 8\" (1.727 m)   Wt 162 lb (73.5 kg)   SpO2 97%   BMI 24.63 kg/m²   General Appearance: alert and oriented to person, place and time and in no acute distress  Skin: warm and dry, scratches on the back, no rash  Head: normocephalic and atraumatic  Eyes: pupils equal, round, and reactive to light, extraocular eye movements intact, conjunctivae normal  Neck: RIJ temporary dialysis catheter in place with clean dressing  Pulmonary/Chest: clear to auscultation bilaterally- no wheezes, rales or rhonchi, normal air movement, no respiratory distress  Cardiovascular: normal rate, normal S1 and S2 and no carotid bruits  Abdomen: soft, non-tender, non-distended, normal bowel sounds, no masses or organomegaly  Extremities: left buttock erythematous, indurated, tender  Neurologic: no cranial nerve deficit and speech normal         Recent Labs     06/28/20  1036 06/28/20  2240 06/29/20  0518    137 138   K 4.4 4.7 4.6    103 104   CO2 24 21* 23   BUN 37* 43* 45*   CREATININE 4.6* 4.6* 4.9*   GLUCOSE 125* 85 93   CALCIUM 8.5* 8.4* 8.2*       Recent Labs     06/27/20  0534 06/28/20  1036 06/29/20  0518   ALKPHOS 59 54 52   PROT 5.6* 5.2* 5.1*   LABALBU 3.3* 3.0* 3.0*   BILITOT 0.6 0.5 0.5   * 77* 54*   * 139* 107*       Recent Labs     06/27/20  0534 06/28/20  0553 06/29/20  0518   WBC 9.3 9.2 9.8   RBC 4.05 3.94 3.74*   HGB 13.8 13.4 12.8   HCT 39.7 40.3 37.7   MCV 98.0 102.3* 100.8*   MCH 34.1 34.0 34.2   MCHC 34.8* 33.3 34.0   RDW 13.3 13.5 13.2   * 152 147   MPV 10.3 11.3 10.6       CBC:   Lab Results   Component Value Date    WBC 9.8 06/29/2020    RBC 3.74 06/29/2020    HGB 12.8 06/29/2020    HCT 37.7 06/29/2020    .8 06/29/2020    MCH 34.2 06/29/2020    MCHC 34.0 06/29/2020    RDW 13.2 06/29/2020     06/29/2020    MPV 10.6 06/29/2020     BMP:    Lab Results   Component Value Date     06/29/2020    K 4.6 06/29/2020     06/29/2020    CO2 23 06/29/2020    BUN 45 06/29/2020    LABALBU 3.0 06/29/2020    CREATININE 4.9 06/29/2020    CALCIUM 8.2 06/29/2020    GFRAA 16 06/29/2020    LABGLOM 13 06/29/2020    GLUCOSE 93 06/29/2020      CK 5,652     Radiology:   CT PELVIS WO CONTRAST Additional Contrast? None   Final Result   1. No significant change in subcutaneous stranding involving the left flank,   left groin, left hip, left buttock, and proximal left thigh. Considerations   include cellulitis, edema, and hemorrhage. 2. Increased stranding or fluid in the presacral fascia and inferior left   posterior pararenal fascia, likely related to the above process.    3. No significant change in asymmetric enlargement of the left gluteus

## 2020-06-29 NOTE — PROGRESS NOTES
Pharmacy Consultation Note  (Antibiotic Dosing and Monitoring)    Initial consult date:   Consulting physician: Dr Beryle North  Drug(s): Vancomycin IV  Indication: Intramuscular abscess in IVDA    Ht Readings from Last 1 Encounters:   20 5' 8\" (1.727 m)     Wt Readings from Last 1 Encounters:   20 162 lb (73.5 kg)       Age/  Gender ActualBW IBW  Allergy Information   54 y.o.     male 68 kg 68.4 kg  Patient has no known allergies. Date  WBC BUN/CR UOP Drug/Dose Time   Given Level(s)   (Time) Comments     (#1) 8.5 143/10.8 -- Vancomycin 1,000 mg one time during last hour of dialysis   (#2) 7.3 65/6.7 0.68 mL/kg/hr Vancomycin 750 mg IV once 1634 Random AM level = 14.7 mcg/mL       (#3) 9.3 43/5.1 1.06 mL/kg/hr Vancomycin 750 mg IV once 1704 Random AM level = 20.9 mcg/mL      (#4) 9.2 37/4.6 1.24 mL/kg/hr Vancomycin 750 mg IV once 1709 Random AM level = 15.2 mcg/mL      (#5) 9.8 47/5.0  *HD today* -- Vancomycin 750 mg IV x 1 after dialysis <1800> Pre-HD level = 17.3 mcg/mL @ 0518      (#6)            (#7)            Estimated Creatinine Clearance: 18 mL/min (A) (based on SCr of 5 mg/dL (H)). UOP over the past 24 hours:       Intake/Output Summary (Last 24 hours) at 2020 1235  Last data filed at 2020 4967  Gross per 24 hour   Intake 2424.79 ml   Output 1830 ml   Net 594.79 ml       Temp max: Temp (24hrs), Av °F (36.7 °C), Min:97.6 °F (36.4 °C), Max:98.5 °F (36.9 °C)      Antibiotic Regimen:  Antibiotic Dose Date Initiated   Zosyn   3.375 g IV Q12H      Cultures:  available culture and sensitivity results were reviewed in EPIC  Culture Date Result    Blood cx #1    NGTD   Blood cx #2    NGTD     Assessment:  · Consulted by Dr. Beryle North to dose/monitor vancomycin  · Goal trough level:  15-20 mcg/mL  · Pt is a 53 y/o M who presented with concern of a possible skin infection to the buttock.  Reports gradually worsening pain and swelling at

## 2020-06-29 NOTE — PROGRESS NOTES
Occupational Therapy  OCCUPATIONAL THERAPY    Date:2020  Patient Name: Kelly Orourke  MRN: [id number]  : 1975  Room: 3378/4268-41      Occupational Therapy order received, chart reviewed and screen performed. Pt has no skilled occupational therapy needs at this time, please re-order occupational therapy if status changes.            Marlene Brandon, OTR/L #[id number]

## 2020-06-29 NOTE — PROGRESS NOTES
CONTRAST: No iodinated contrast was utilized during the study. SEDATION: No intravenous sedation. FLUOROSCOPY DOSE AND TYPE OR TIME AND EXPOSURES: 0.4 minutes of fluoroscopy time, DAP 4558 mGy cm squared. Air Kerma 12 mGy. DESCRIPTION OF PROCEDURE: I discussed the alternatives, benefits and risks of the procedure with Mr. Paris Horne; all of his questions were answered and he consents. We discussed the nature of kidney function, renal failure, and the need for hemodialysis currently. The patient was placed supine on the angiography table and patient identification time out was performed. The right side of the neck was evaluated, the internal jugular vein is widely patent and images were obtained; an appropriate area of the skin was marked, the skin was prepared and draped with sterile technique. Maximum sterile barrier technique was utilized. The skin and subcutaneous tissues were anesthetized with 1% lidocaine using a 25 gauge needle. A 3 mm skin nick was made. Using ultrasound guidance, a 21 gauge needle was placed into the right internal jugular vein and there was blood return. A 0.018 inch guidewire was inserted through the needle, fluoroscopy confirms satisfactory position into the superior vena cava. The needle was exchanged for a micropuncture kit. The wire was advanced to the SVC/RA junction and the appropriate length for a temporary hemodialysis catheter was measured at 20 cm. The catheter was exchanged over a 0.035 inch [employer] guidewire, the tract was dilated and a 14 [place] by 20 cm dual lumen hemodialysis catheter was inserted until its tip was in the central superior vena cava, near the junction with the right atrium. There was low resistance aspiration and injection of fluid, satisfactory flow for hemodialysis. Each lumen was flushed with 10 mL of heparinized saline then instilled with heparin 1000 units/mL: 1.5 mL into the \"arterial\" red lumen and 1.6 mL into the blue \"venous\" lumen.   The catheter was sutured into place using 2 stitches of 2-0 Ethilon. There are no signs of swelling or bleeding at the puncture site. A dry sterile dressing was applied. Follow-up radiograph demonstrates satisfactory positioning of the catheter with the tip overlying the SVC/RA junction. No tortuosity or kinking detected. Mr. Suni Boykin tolerated the procedure well and was transferred in satisfactory condition. FINDINGS: Widely patent right internal jugular vein. Follow-up fluoroscopic spot image demonstrates satisfactory positioning of the 14 [place]ian by 20 cm temporary hemodialysis catheter. Satisfactory insertion of a temporary hemodialysis catheter; the catheter is ready for use. Ir Ultrasound Guidance Vascular Access    Result Date: 6/25/2020  PROCEDURE: IR FLUOROSCOPY GUIDED CENTRAL VENOUS ACCESS DEVICE PLACEMENT; US GUIDED VASCULAR ACCESS MODERATE CONSCIOUS SEDATION 6/25/2020 HISTORY: ORDERING SYSTEM PROVIDED HISTORY: temporary dialysis catheter placement TECHNOLOGIST PROVIDED HISTORY: Reason for exam:->temporary dialysis catheter placement renal failure with elevated creatinine and potassium. History of drug use. TECHNIQUE: Ultrasound guided insertion of a temporary hemodialysis catheter via the right internal jugular vein. CONTRAST: No iodinated contrast was utilized during the study. SEDATION: No intravenous sedation. FLUOROSCOPY DOSE AND TYPE OR TIME AND EXPOSURES: 0.4 minutes of fluoroscopy time, DAP 4558 mGy cm squared. Air Kerma 12 mGy. DESCRIPTION OF PROCEDURE: I discussed the alternatives, benefits and risks of the procedure with Mr. Suni Boykin; all of his questions were answered and he consents. We discussed the nature of kidney function, renal failure, and the need for hemodialysis currently. The patient was placed supine on the angiography table and patient identification time out was performed.   The right side of the neck was evaluated, the internal jugular vein is widely patent and images were obtained; an appropriate area of the skin was marked, the skin was prepared and draped with sterile technique. Maximum sterile barrier technique was utilized. The skin and subcutaneous tissues were anesthetized with 1% lidocaine using a 25 gauge needle. A 3 mm skin nick was made. Using ultrasound guidance, a 21 gauge needle was placed into the right internal jugular vein and there was blood return. A 0.018 inch guidewire was inserted through the needle, fluoroscopy confirms satisfactory position into the superior vena cava. The needle was exchanged for a micropuncture kit. The wire was advanced to the SVC/RA junction and the appropriate length for a temporary hemodialysis catheter was measured at 20 cm. The catheter was exchanged over a 0.035 inch [employer] guidewire, the tract was dilated and a 14 [place] by 20 cm dual lumen hemodialysis catheter was inserted until its tip was in the central superior vena cava, near the junction with the right atrium. There was low resistance aspiration and injection of fluid, satisfactory flow for hemodialysis. Each lumen was flushed with 10 mL of heparinized saline then instilled with heparin 1000 units/mL: 1.5 mL into the \"arterial\" red lumen and 1.6 mL into the blue \"venous\" lumen. The catheter was sutured into place using 2 stitches of 2-0 Ethilon. There are no signs of swelling or bleeding at the puncture site. A dry sterile dressing was applied. Follow-up radiograph demonstrates satisfactory positioning of the catheter with the tip overlying the SVC/RA junction. No tortuosity or kinking detected. Mr. Yazmin Frias tolerated the procedure well and was transferred in satisfactory condition. FINDINGS: Widely patent right internal jugular vein. Follow-up fluoroscopic spot image demonstrates satisfactory positioning of the 14 Moroccan by 20 cm temporary hemodialysis catheter. Satisfactory insertion of a temporary hemodialysis catheter; the catheter is ready for use. Objective:   Vitals: /80   Pulse 88   Temp 98 °F (36.7 °C) (Oral)   Resp 16   Ht 5' 8\" (1.727 m)   Wt 162 lb (73.5 kg)   SpO2 97%   BMI 24.63 kg/m²   General appearance: appears stated age   Skin:  No rashes or lesions  HEENT: Head: Normocephalic, no lesions, without obvious abnormality. Neck: no adenopathy, no carotid bruit, no JVD, supple, symmetrical, trachea midline and thyroid not enlarged, symmetric, no tenderness/mass/nodules  Lungs: clear to auscultation bilaterally  Heart: regular rate and rhythm, S1, S2 normal, no murmur, click, rub or gallop  Abdomen: soft, non-tender; bowel sounds normal; no masses,  no organomegaly  Extremities: extremities normal, atraumatic, no cyanosis or edema  Neurologic: Mental status: Alert, oriented, thought content appropriate    Assessment:   Patient Active Problem List:     Acute renal failure (ARF) (HCC)     History of drug use     Non-traumatic rhabdomyolysis     Hyperkalemia    Plan:     1.   Acute kidney injury.  Acute kidney injury secondary to multiple factors including volume depletion in the setting of use of large amount of nonsteroidal anti-inflammatory drugs along  rhabdomyolysis contributing the patient's renal failure.  Patient has had improved urinary output.  Renal numbers still high , GFR at 11-12 cc/min - continue dialysis      2.   Hyperkalemia.  Hyperkalemia secondary to acute kidney injury.  Improved. .     3.   Metabolic acidosis.  Patient with increased anion gap metabolic acidosis with a delta anion gap to delta bicarbonate ratio close to 1.  This was secondary to acute kidney injury.  Bicarbonate levels are now normal..     4.   Rhabdomyolysis.  Rhabdomyolysis most likely due to his cocaine use.  CPK levels lower. 471       5.  Hyperphosphatemia.  This reflects acute kidney injury and rhabdomyolysis.  improving      Continue present care   Haroon Navarro

## 2020-06-30 PROBLEM — F19.10 SUBSTANCE ABUSE (HCC): Status: ACTIVE | Noted: 2020-06-25

## 2020-06-30 LAB
ALBUMIN SERPL-MCNC: 3.2 G/DL (ref 3.5–5.2)
ALP BLD-CCNC: 53 U/L (ref 40–129)
ALT SERPL-CCNC: 89 U/L (ref 0–40)
ANION GAP SERPL CALCULATED.3IONS-SCNC: 11 MMOL/L (ref 7–16)
ANION GAP SERPL CALCULATED.3IONS-SCNC: 12 MMOL/L (ref 7–16)
ANION GAP SERPL CALCULATED.3IONS-SCNC: 12 MMOL/L (ref 7–16)
AST SERPL-CCNC: 42 U/L (ref 0–39)
BASOPHILS ABSOLUTE: 0.07 E9/L (ref 0–0.2)
BASOPHILS RELATIVE PERCENT: 0.8 % (ref 0–2)
BETA GLOBULIN: 0 AU/ML (ref 0–19)
BILIRUB SERPL-MCNC: 0.4 MG/DL (ref 0–1.2)
BLOOD CULTURE, ROUTINE: NORMAL
BUN BLDV-MCNC: 27 MG/DL (ref 6–20)
BUN BLDV-MCNC: 30 MG/DL (ref 6–20)
BUN BLDV-MCNC: 35 MG/DL (ref 6–20)
CALCIUM SERPL-MCNC: 8.4 MG/DL (ref 8.6–10.2)
CALCIUM SERPL-MCNC: 8.4 MG/DL (ref 8.6–10.2)
CALCIUM SERPL-MCNC: 8.5 MG/DL (ref 8.6–10.2)
CHLORIDE BLD-SCNC: 103 MMOL/L (ref 98–107)
CHLORIDE BLD-SCNC: 104 MMOL/L (ref 98–107)
CHLORIDE BLD-SCNC: 106 MMOL/L (ref 98–107)
CO2: 24 MMOL/L (ref 22–29)
CO2: 24 MMOL/L (ref 22–29)
CO2: 25 MMOL/L (ref 22–29)
CREAT SERPL-MCNC: 3.7 MG/DL (ref 0.7–1.2)
CREAT SERPL-MCNC: 3.9 MG/DL (ref 0.7–1.2)
CREAT SERPL-MCNC: 4.2 MG/DL (ref 0.7–1.2)
CULTURE, BLOOD 2: NORMAL
EOSINOPHILS ABSOLUTE: 0.34 E9/L (ref 0.05–0.5)
EOSINOPHILS RELATIVE PERCENT: 3.8 % (ref 0–6)
GFR AFRICAN AMERICAN: 19
GFR AFRICAN AMERICAN: 20
GFR AFRICAN AMERICAN: 21
GFR NON-AFRICAN AMERICAN: 15 ML/MIN/1.73
GFR NON-AFRICAN AMERICAN: 17 ML/MIN/1.73
GFR NON-AFRICAN AMERICAN: 18 ML/MIN/1.73
GLUCOSE BLD-MCNC: 116 MG/DL (ref 74–99)
GLUCOSE BLD-MCNC: 146 MG/DL (ref 74–99)
GLUCOSE BLD-MCNC: 94 MG/DL (ref 74–99)
HCT VFR BLD CALC: 36.6 % (ref 37–54)
HEMOGLOBIN: 12.2 G/DL (ref 12.5–16.5)
HEPATITIS B CORE TOTAL ANTIBODY: NONREACTIVE
IMMATURE GRANULOCYTES #: 0.11 E9/L
IMMATURE GRANULOCYTES %: 1.2 % (ref 0–5)
LYMPHOCYTES ABSOLUTE: 1.31 E9/L (ref 1.5–4)
LYMPHOCYTES RELATIVE PERCENT: 14.7 % (ref 20–42)
MAGNESIUM: 1.9 MG/DL (ref 1.6–2.6)
MCH RBC QN AUTO: 33.9 PG (ref 26–35)
MCHC RBC AUTO-ENTMCNC: 33.3 % (ref 32–34.5)
MCV RBC AUTO: 101.7 FL (ref 80–99.9)
METER GLUCOSE: 285 MG/DL (ref 74–99)
MONOCYTES ABSOLUTE: 0.74 E9/L (ref 0.1–0.95)
MONOCYTES RELATIVE PERCENT: 8.3 % (ref 2–12)
NEUTROPHILS ABSOLUTE: 6.32 E9/L (ref 1.8–7.3)
NEUTROPHILS RELATIVE PERCENT: 71.2 % (ref 43–80)
PDW BLD-RTO: 13.1 FL (ref 11.5–15)
PHOSPHORUS: 4.9 MG/DL (ref 2.5–4.5)
PLATELET # BLD: 156 E9/L (ref 130–450)
PMV BLD AUTO: 10.6 FL (ref 7–12)
POTASSIUM REFLEX MAGNESIUM: 4.2 MMOL/L (ref 3.5–5)
POTASSIUM SERPL-SCNC: 4.2 MMOL/L (ref 3.5–5)
POTASSIUM SERPL-SCNC: 4.4 MMOL/L (ref 3.5–5)
RBC # BLD: 3.6 E12/L (ref 3.8–5.8)
SODIUM BLD-SCNC: 139 MMOL/L (ref 132–146)
SODIUM BLD-SCNC: 140 MMOL/L (ref 132–146)
SODIUM BLD-SCNC: 142 MMOL/L (ref 132–146)
TOTAL CK: 273 U/L (ref 20–200)
TOTAL PROTEIN: 5.4 G/DL (ref 6.4–8.3)
WBC # BLD: 8.9 E9/L (ref 4.5–11.5)

## 2020-06-30 PROCEDURE — 80048 BASIC METABOLIC PNL TOTAL CA: CPT

## 2020-06-30 PROCEDURE — 6360000002 HC RX W HCPCS: Performed by: INTERNAL MEDICINE

## 2020-06-30 PROCEDURE — 1200000000 HC SEMI PRIVATE

## 2020-06-30 PROCEDURE — 6370000000 HC RX 637 (ALT 250 FOR IP): Performed by: INTERNAL MEDICINE

## 2020-06-30 PROCEDURE — 85025 COMPLETE CBC W/AUTO DIFF WBC: CPT

## 2020-06-30 PROCEDURE — 2580000003 HC RX 258: Performed by: INTERNAL MEDICINE

## 2020-06-30 PROCEDURE — 83735 ASSAY OF MAGNESIUM: CPT

## 2020-06-30 PROCEDURE — 82962 GLUCOSE BLOOD TEST: CPT

## 2020-06-30 PROCEDURE — 80053 COMPREHEN METABOLIC PANEL: CPT

## 2020-06-30 PROCEDURE — 84100 ASSAY OF PHOSPHORUS: CPT

## 2020-06-30 PROCEDURE — 36415 COLL VENOUS BLD VENIPUNCTURE: CPT

## 2020-06-30 PROCEDURE — 99233 SBSQ HOSP IP/OBS HIGH 50: CPT | Performed by: INTERNAL MEDICINE

## 2020-06-30 PROCEDURE — 82550 ASSAY OF CK (CPK): CPT

## 2020-06-30 PROCEDURE — 97116 GAIT TRAINING THERAPY: CPT

## 2020-06-30 RX ADMIN — SODIUM CHLORIDE, PRESERVATIVE FREE 10 ML: 5 INJECTION INTRAVENOUS at 10:14

## 2020-06-30 RX ADMIN — FENTANYL CITRATE 50 MCG: 50 INJECTION INTRAMUSCULAR; INTRAVENOUS at 14:04

## 2020-06-30 RX ADMIN — SODIUM CHLORIDE: 9 INJECTION, SOLUTION INTRAVENOUS at 03:28

## 2020-06-30 RX ADMIN — SODIUM CHLORIDE: 9 INJECTION, SOLUTION INTRAVENOUS at 15:36

## 2020-06-30 RX ADMIN — FENTANYL CITRATE 50 MCG: 50 INJECTION INTRAMUSCULAR; INTRAVENOUS at 06:54

## 2020-06-30 RX ADMIN — PIPERACILLIN AND TAZOBACTAM 3.38 G: 3; .375 INJECTION, POWDER, FOR SOLUTION INTRAVENOUS at 11:50

## 2020-06-30 RX ADMIN — FENTANYL CITRATE 50 MCG: 50 INJECTION INTRAMUSCULAR; INTRAVENOUS at 23:32

## 2020-06-30 RX ADMIN — FENTANYL CITRATE 50 MCG: 50 INJECTION INTRAMUSCULAR; INTRAVENOUS at 16:05

## 2020-06-30 RX ADMIN — ACETAMINOPHEN 650 MG: 325 TABLET, FILM COATED ORAL at 04:03

## 2020-06-30 RX ADMIN — HEPARIN SODIUM 5000 UNITS: 5000 INJECTION, SOLUTION INTRAVENOUS; SUBCUTANEOUS at 20:20

## 2020-06-30 RX ADMIN — FENTANYL CITRATE 50 MCG: 50 INJECTION INTRAMUSCULAR; INTRAVENOUS at 17:36

## 2020-06-30 RX ADMIN — FENTANYL CITRATE 50 MCG: 50 INJECTION INTRAMUSCULAR; INTRAVENOUS at 21:36

## 2020-06-30 RX ADMIN — FENTANYL CITRATE 50 MCG: 50 INJECTION INTRAMUSCULAR; INTRAVENOUS at 10:13

## 2020-06-30 RX ADMIN — FENTANYL CITRATE 50 MCG: 50 INJECTION INTRAMUSCULAR; INTRAVENOUS at 08:37

## 2020-06-30 RX ADMIN — FENTANYL CITRATE 50 MCG: 50 INJECTION INTRAMUSCULAR; INTRAVENOUS at 01:54

## 2020-06-30 RX ADMIN — Medication 10 ML: at 08:41

## 2020-06-30 RX ADMIN — HEPARIN SODIUM 5000 UNITS: 5000 INJECTION, SOLUTION INTRAVENOUS; SUBCUTANEOUS at 08:42

## 2020-06-30 RX ADMIN — FENTANYL CITRATE 50 MCG: 50 INJECTION INTRAMUSCULAR; INTRAVENOUS at 12:30

## 2020-06-30 RX ADMIN — FENTANYL CITRATE 50 MCG: 50 INJECTION INTRAMUSCULAR; INTRAVENOUS at 03:59

## 2020-06-30 RX ADMIN — PIPERACILLIN AND TAZOBACTAM 3.38 G: 3; .375 INJECTION, POWDER, FOR SOLUTION INTRAVENOUS at 21:33

## 2020-06-30 RX ADMIN — FENTANYL CITRATE 50 MCG: 50 INJECTION INTRAMUSCULAR; INTRAVENOUS at 20:20

## 2020-06-30 RX ADMIN — SODIUM CHLORIDE: 9 INJECTION, SOLUTION INTRAVENOUS at 16:13

## 2020-06-30 ASSESSMENT — PAIN DESCRIPTION - FREQUENCY: FREQUENCY: CONTINUOUS

## 2020-06-30 ASSESSMENT — PAIN SCALES - GENERAL
PAINLEVEL_OUTOF10: 4
PAINLEVEL_OUTOF10: 7
PAINLEVEL_OUTOF10: 4
PAINLEVEL_OUTOF10: 7

## 2020-06-30 ASSESSMENT — PAIN DESCRIPTION - ORIENTATION
ORIENTATION: LEFT

## 2020-06-30 ASSESSMENT — PAIN DESCRIPTION - DESCRIPTORS
DESCRIPTORS: ACHING;DISCOMFORT;THROBBING
DESCRIPTORS: ACHING;CONSTANT;DISCOMFORT

## 2020-06-30 ASSESSMENT — PAIN DESCRIPTION - LOCATION
LOCATION: HIP

## 2020-06-30 ASSESSMENT — PAIN DESCRIPTION - PAIN TYPE: TYPE: ACUTE PAIN

## 2020-06-30 ASSESSMENT — PAIN DESCRIPTION - PROGRESSION
CLINICAL_PROGRESSION: NOT CHANGED
CLINICAL_PROGRESSION: NOT CHANGED

## 2020-06-30 NOTE — PROGRESS NOTES
[address] [place], [place] [address]   Progress Note    Admitting Date and Time: 6/25/2020 10:54 AM  Admit Dx: Acute renal failure (ARF) ([facility] [N17.9]  Acute renal failure (ARF) ([facility] [N17.9]    Subjective:    6/29: Pt feels about the same this morning. Pain improved with increased frequency of fentanyl. Discussed that after IV antibiotics are discontinued, he may need tunneled dialysis catheter prior to discharge to home for outpatient dialysis. 6/30: Patient still having soreness in left buttocks but improving from admission.      nicotine  1 patch Transdermal Daily    sodium chloride flush  10 mL Intravenous 2 times per day    heparin (porcine)  5,000 Units Subcutaneous BID    vancomycin (VANCOCIN) intermittent dosing (placeholder)   Other RX Placeholder    piperacillin-tazobactam  3.375 g Intravenous Q12H     fentanNYL, 50 mcg, Q1H PRN  ondansetron, 4 mg, Q6H PRN  glucose, 15 g, PRN  dextrose, 12.5 g, PRN  glucagon (rDNA), 1 mg, PRN  dextrose, 100 mL/hr, PRN  sodium chloride flush, 10 mL, PRN  acetaminophen, 650 mg, Q6H PRN    Or  acetaminophen, 650 mg, Q6H PRN  polyethylene glycol, 17 g, Daily PRN  promethazine, 12.5 mg, Q6H PRN    Or  ondansetron, 4 mg, Q6H PRN         Objective:    /72   Pulse 78   Temp 98.3 °F (36.8 °C) (Oral)   Resp 16   Ht 5' 8\" (1.727 m)   Wt 162 lb 3 oz (73.6 kg)   SpO2 98%   BMI 24.66 kg/m²   General Appearance: alert and in no acute distress  Skin: warm and dry, scratches on the back, no rash  Head: normocephalic and atraumatic  Eyes: pupils equal, round, and reactive to light, extraocular eye movements intact, conjunctivae normal  Neck: RIJ temporary dialysis catheter in place with clean dressing  Pulmonary/Chest: clear to auscultation bilaterally- no wheezes, rales or rhonchi, normal air movement, no respiratory distress  Cardiovascular: normal rate, normal S1 and S2 and no carotid bruits  Abdomen: soft, non-tender, non-distended, normal bowel sounds, no masses or organomegaly  Extremities: left buttock erythematous, indurated, tender  Neurologic: no cranial nerve deficit and speech normal         Recent Labs     06/29/20  2259 06/30/20  0725 06/30/20  1125    140 139   K 3.8 4.2 4.4    104 103   CO2 27 25 24   BUN 27* 27* 30*   CREATININE 3.6* 3.7* 3.9*   GLUCOSE 105* 94 146*   CALCIUM 8.3* 8.5* 8.4*       Recent Labs     06/28/20  1036 06/29/20  0518 06/30/20  0725   ALKPHOS 54 52 53   PROT 5.2* 5.1* 5.4*   LABALBU 3.0* 3.0* 3.2*   BILITOT 0.5 0.5 0.4   AST 77* 54* 42*   * 107* 89*       Recent Labs     06/28/20  0553 06/29/20  0518 06/30/20  0725   WBC 9.2 9.8 8.9   RBC 3.94 3.74* 3.60*   HGB 13.4 12.8 12.2*   HCT 40.3 37.7 36.6*   .3* 100.8* 101.7*   MCH 34.0 34.2 33.9   MCHC 33.3 34.0 33.3   RDW 13.5 13.2 13.1    147 156   MPV 11.3 10.6 10.6       Hepatitis B Surface Antibody [[id number]] Collected: 06/26/20 0539     Specimen: Blood Updated: 06/29/20 1432      Hep B S Ab Non-Reactive     Hepatitis panel, acute [[id number]] Collected: 06/26/20 0539     Specimen: Blood Updated: 06/29/20 1432      Hep A IgM Non-Reactive      Hep B Core Ab, IgM Non-Reactive      Hep B S Ag Interp Non-Reactive      Hep C Ab Interp Non-Reactive     Anti-DNA Antibody, Double-Stranded [[id number]] Collected: 06/25/20 2353     Specimen: Blood Updated: 06/29/20 1201      Anti ds DNA NEGATIVE     MEEK [[id number]] Collected: 06/26/20 0539     Specimen: Blood Updated: 06/29/20 1152      MEEK NEGATIVE        Ref. Range 6/25/2020 15:13 6/25/2020 23:53 6/26/2020 05:39 6/27/2020 05:34 6/28/2020 10:36 6/29/2020 05:18 6/30/2020 07:25   Total CK Latest Ref Range: 20 - 200 U/L 11,090 (H) 6,656 (H) 5,652 (H) 2,365 (H) 820 (H) 471 (H) 273 (H)     Radiology:   CT PELVIS WO CONTRAST Additional Contrast? None   Final Result   1. No significant change in subcutaneous stranding involving the left flank,   left groin, left hip, left buttock, and proximal left thigh. Considerations   include cellulitis, edema, and hemorrhage. 2. Increased stranding or fluid in the presacral fascia and inferior left   posterior pararenal fascia, likely related to the above process. 3. No significant change in asymmetric enlargement of the left gluteus   beverly muscle potentially due to myositis or intramuscular hematoma. 4. No evident loculated fluid to suggest abscess, although evaluation   especially in the soft tissues is partially limited by lack of intravenous   contrast administration. CT PELVIS WO CONTRAST Additional Contrast? None   Final Result   Limited evaluation without intravenous contrast.      Left buttock inflammatory changes. No subcutaneous discrete fluid collection. Enlarged left gluteus beverly muscle likely related to infection. An   intramuscular abscess is not excluded. IR FLUORO GUIDED CVA DEVICE PLMT/REPLACE/REMOVAL   Final Result   Satisfactory insertion of a temporary hemodialysis catheter; the catheter is   ready for use. IR ULTRASOUND GUIDANCE VASCULAR ACCESS   Final Result   Satisfactory insertion of a temporary hemodialysis catheter; the catheter is   ready for use. Assessment/Plan:  Principal Problem:    Acute renal failure (ARF) (HCC)  Active Problems:    History of drug use    Non-traumatic rhabdomyolysis    Hyperkalemia  Resolved Problems:    * No resolved hospital problems. *      1. Acute kidney injury  -improved with HD; last treatment 6/29  - urine output improved. -nephrology to reassess dialysis need on daily basis but no plans for today. Rechecking labs tomorrow. Holding off on decision for tunneled HD catheter for now. 2. Nontraumatic vs traumatic rhabdomyolysis   -unable to be determined. He denies fall but clinically appears to be injury and therefore would be traumatic etiology but if cocaine use etiology then would be      3.  Hyperkalemia with EKG changes   -resolved with HD      4. Left buttock erythema and induration    -no clinical signs of abscess per general surgery   -on antibiotic coverage Day #6 of Vanco and Zosyn. -repeat CT for comparison per general surgery does not show evidence of abscess  -continue antibiotics through 7/1.      5. Substance abuse  -urine toxicology screen, patient is not very forthcoming about use history, states he hasn't used in \"a long time\" but admitted to taking hit off pipe but did not get buzz.  -urine toxicology positive for cocaine and fentanyl  -counseled about drug use     Case discussed with Dr. Alexi Galvan of nephrology earlier today. NOTE: This report was transcribed using voice recognition software. Every effort was made to ensure accuracy; however, inadvertent computerized transcription errors may be present.      Electronically signed by Gorge Harrington MD on 6/30/2020 at 2:24 PM

## 2020-06-30 NOTE — CARE COORDINATION
SS Note: Initial transition of care assessment completed 6/26. Rob Mathur, [facility], stated he met with pt and pt is in agreement with outpatient treatment at [facility]. ZAINA called and arranged assessment for Monday, July 6, 12PM, appointment placed on [facility] follow up section. Yesterday pt stated physicians have not advised whether he will need outpatient dialysis, SW reviewed local dialysis centers, stated both [facility] and [facility] are nearby if needed. Yesterday ZAINA called [facility] [employer], and he will review and follow for possible outpatient dialysis. Pt at this time does not have a PCP and will need choice and set up for one at [facility]. He approves using the [facility] PCP program.   Electronically signed by EDWARDO Spaulding on 6/30/2020 at 10:34 AM     Addendum: Per charge nurse Arnoldo Wade, she spoke to Dr. Neena Jarrell and at this time dialysis to continue inpatient however pt may not need as outpatient. ZAINA updated [employer], and requested he continue to follow.   Electronically signed by EDWARDO Spaulding on 6/30/2020 at 4:38 PM

## 2020-06-30 NOTE — PROGRESS NOTES
Physical Therapy    Physical Therapy Treatment Note    Room #:  7262/9770-33  Patient Name: Stefania Jacobs  YOB: 1975  MRN: [id number]    Referring Provider: Ana Grier DO    Date of Service: 6/30/2020    Evaluating Physical Therapist:  Marisol Esteban, PT  Lic. # [id number]      Diagnosis:   Acute renal failure (ARF) ([facility] [address] [N17.9]  Acute renal failure (ARF) ([facility]) [N17.9]   Admitted with left leg numbness, right arm pain  Hip pain and foot swelling, chills; rhabdomyolysis  Temporary hemodialysis catheter placed    Patient Active Problem List   Diagnosis    Acute renal failure (ARF) ([facility] [address]    History of drug use    Non-traumatic rhabdomyolysis    Hyperkalemia        Tentative placement recommendation: Home    Equipment recommendation: Patient has needed equipment       Prior Level of Function: Patient ambulated independently    Rehab Potential: good  for baseline    Past medical history:   History reviewed. No pertinent past medical history. History reviewed. No pertinent surgical history.     Precautions: falls ,      SUBJECTIVE:    Social history: Patient lives with family  in a ranch home  with 4 steps  to enter with [address]: [employer],       [address] [facility]   How much difficulty turning over in bed?: None  How much difficulty sitting down on / standing up from a chair with arms?: None  How much difficulty moving from lying on back to sitting on side of bed?: None  How much help from another person moving to and from a bed to a chair?: None  How much help from another person needed to walk in hospital room?: None  How much help from another person for climbing 3-5 steps with a railing?: A Little  AM-PAC Inpatient Mobility Raw Score : 23  AM-PAC Inpatient T-Scale Score : 56.93  Mobility Inpatient CMS 0-100% Score: 11.2  Mobility Inpatient CMS G-Code Modifier : CI    Nursing cleared patient for PT.  OBJECTIVE: Initial Evaluation  Date: 6/28/20 Treatment Date:  6/30/2020     Short Term/ Long Term   Goals   Was pt agreeable to Eval/treatment? Yes   yes To be met in 2 days   Pain level   5/10  left buttock and leg d/t swelling 7/10    Bed Mobility    Rolling: Independent    Supine to sit: Independent    Sit to supine: Independent    Scooting: Independent   . Rolling: Independent    Supine to sit:  Independent   Sit to supine: Independent   Scooting: Independent       Transfers Sit to stand: Independent   Sit to stand: Independent   Stand pivot: Not assessed         Ambulation    150 feet using  cane with Independent      2 x 75 feet using  wheeled walker with Independent         Stair negotiation: ascended and descended   Not assessed    na   4 steps with rail independent    ROM Within functional limits with exception of left hip flexion 75 deg na   Increase range of motion 10% of affected joints    Strength BUE: 4/5  RLE:  4/5  LLE:  3/5 na  Increase strength in affected mm groups by 1/3 grade   Balance Sitting EOB:  good    Dynamic Standing:  good   Sitting EOB:  good   Dynamic Standing:  good         Patient is Alert & Oriented x person, place, time and situation and follows directions    Sensation:  Patient  reports numbness and tingling to extremities Left lower extremity   Edema:  yes left lower extremity    Endurance: good            Patient education  Patient educated on role of Physical Therapy, risks of immobility, safety and plan of care      Patient response to education:   Pt verbalized understanding Pt demonstrated skill Pt requires further education in this area   Yes Yes No      Patient is Alert & Oriented x person, place, time and situation  and follows directions    Sensation:  Pt denies numbness and tingling to extremities  Edema:  none noted     Patient education  Patient educated on role of Physical Therapy, risks of immobility, safety and plan of care and stair training      Patient response to education: Pt verbalized understanding Pt demonstrated skill Pt requires further education in this area   Yes Yes No       ASSESSMENT:    Comments: Pain c/o Left hip pain throughout session. Treatment:  Patient practiced and was instructed in the following treatment:    Pt transferred oob and ambulated in vazquez as above. Did not want to get a wheeled walker for home, therapist ambulated pt with wheeled walker to demonstrate to the pt the benefit of use for pain relief, pt continues to decline. Therapeutic Exercises: not performed      At end of session, patient in bed with na call light and phone within reach,  all lines and tubes intact, nursing notified. Patient would benefit from continued skilled Physical Therapy to improve functional independence and quality of life. PLAN:    Patient is making good progress towards established goals. Will continue with current Plan of care.       Time in  1200  Time out  1215    Total Treatment Time  15 minutes    CPT codes:  Gait Training (41060) 15 minutes 1 unit(s)    Leena Alvarez PTA   Lic# [id number]

## 2020-06-30 NOTE — PROGRESS NOTES
Progress Note  6/30/2020 1:16 PM  Subjective:   Admit Date: 6/25/2020  PCP: Godfrey Madrigal DO  Interval History: patient examined doing well no complains     Diet: DIET RENAL;    Data:   Scheduled Meds:   nicotine  1 patch Transdermal Daily    sodium chloride flush  10 mL Intravenous 2 times per day    heparin (porcine)  5,000 Units Subcutaneous BID    vancomycin (VANCOCIN) intermittent dosing (placeholder)   Other RX Placeholder    piperacillin-tazobactam  3.375 g Intravenous Q12H     Continuous Infusions:   dextrose      sodium chloride 70 mL/hr at 06/29/20 1704    sodium chloride Stopped (06/30/20 0549)     PRN Meds:fentanNYL, ondansetron, glucose, dextrose, glucagon (rDNA), dextrose, sodium chloride flush, acetaminophen **OR** acetaminophen, polyethylene glycol, promethazine **OR** ondansetron  I/O last 3 completed shifts: In: 900 [P.O.:600]  Out: 1050 [Urine:250]  No intake/output data recorded. Intake/Output Summary (Last 24 hours) at 6/30/2020 1316  Last data filed at 6/29/2020 1834  Gross per 24 hour   Intake 660 ml   Output 800 ml   Net -140 ml     CBC:   Recent Labs     06/28/20  0553 06/29/20  0518 06/30/20  0725   WBC 9.2 9.8 8.9   HGB 13.4 12.8 12.2*    147 156     BMP:    Recent Labs     06/29/20  2259 06/30/20  0725 06/30/20  1125    140 139   K 3.8 4.2 4.4    104 103   CO2 27 25 24   BUN 27* 27* 30*   CREATININE 3.6* 3.7* 3.9*   GLUCOSE 105* 94 146*     Hepatic:   Recent Labs     06/28/20  1036 06/29/20  0518 06/30/20  0725   AST 77* 54* 42*   * 107* 89*   BILITOT 0.5 0.5 0.4   ALKPHOS 54 52 53     Troponin: No results for input(s): TROPONINI in the last 72 hours. BNP: No results for input(s): BNP in the last 72 hours. Lipids: No results for input(s): CHOL, HDL in the last 72 hours.     Invalid input(s): LDLCALCU  ABGs: No results found for: PHART, PO2ART, TBI8YUS  INR: No results for input(s): INR in the last 72 hours.    -----------------------------------------------------------------  RAD: Ct Pelvis Wo Contrast Additional Contrast? None    Result Date: 6/25/2020  EXAMINATION: CT OF THE PELVIS WITHOUT CONTRAST, 6/25/2020 4:41 pm TECHNIQUE: CT of the pelvis was performed without the administration of intravenous contrast.  Multiplanar reformatted images are provided for review. Dose modulation, iterative reconstruction, and/or weight based adjustment of the mA/kV was utilized to reduce the radiation dose to as low as reasonably achievable. COMPARISON: None HISTORY: ORDERING SYSTEM PROVIDED HISTORY: Possible left buttock abscess. TECHNOLOGIST PROVIDED HISTORY: Reason for exam:->Possible left buttock abscess. Additional Contrast?->None FINDINGS: Evaluation is limited by motion. No free fluid within the pelvis. No bladder calculus. No evidence of bowel obstruction. Subcutaneous inflammatory changes along the left buttock region. No focal drainable subcutaneous fluid collection. Left gluteus beverly muscle is enlarged. Degenerative changes within the lower lumbar spine. Hip joint spaces are grossly preserved. Limited evaluation without intravenous contrast. Left buttock inflammatory changes. No subcutaneous discrete fluid collection. Enlarged left gluteus beverly muscle likely related to infection. An intramuscular abscess is not excluded. Ir [place] Device Plmt/replace/removal    Result Date: 6/25/2020  PROCEDURE: IR FLUOROSCOPY GUIDED CENTRAL VENOUS ACCESS DEVICE PLACEMENT; US GUIDED VASCULAR ACCESS MODERATE CONSCIOUS SEDATION 6/25/2020 HISTORY: ORDERING SYSTEM PROVIDED HISTORY: temporary dialysis catheter placement TECHNOLOGIST PROVIDED HISTORY: Reason for exam:->temporary dialysis catheter placement renal failure with elevated creatinine and potassium. History of drug use. TECHNIQUE: Ultrasound guided insertion of a temporary hemodialysis catheter via the right internal jugular vein.  CONTRAST: No iodinated contrast was utilized during the study. SEDATION: No intravenous sedation. FLUOROSCOPY DOSE AND TYPE OR TIME AND EXPOSURES: 0.4 minutes of fluoroscopy time, DAP 4558 mGy cm squared. Air Kerma 12 mGy. DESCRIPTION OF PROCEDURE: I discussed the alternatives, benefits and risks of the procedure with . Deedee Looney; all of his questions were answered and he consents. We discussed the nature of kidney function, renal failure, and the need for hemodialysis currently. The patient was placed supine on the angiography table and patient identification time out was performed. The right side of the neck was evaluated, the internal jugular vein is widely patent and images were obtained; an appropriate area of the skin was marked, the skin was prepared and draped with sterile technique. Maximum sterile barrier technique was utilized. The skin and subcutaneous tissues were anesthetized with 1% lidocaine using a 25 gauge needle. A 3 mm skin nick was made. Using ultrasound guidance, a 21 gauge needle was placed into the right internal jugular vein and there was blood return. A 0.018 inch guidewire was inserted through the needle, fluoroscopy confirms satisfactory position into the superior vena cava. The needle was exchanged for a micropuncture kit. The wire was advanced to the SVC/RA junction and the appropriate length for a temporary hemodialysis catheter was measured at 20 cm. The catheter was exchanged over a 0.035 inch [place] guidewire, the tract was dilated and a 14 [place] by 20 cm dual lumen hemodialysis catheter was inserted until its tip was in the central superior vena cava, near the junction with the right atrium. There was low resistance aspiration and injection of fluid, satisfactory flow for hemodialysis. Each lumen was flushed with 10 mL of heparinized saline then instilled with heparin 1000 units/mL: 1.5 mL into the \"arterial\" red lumen and 1.6 mL into the blue \"venous\" lumen.   The catheter was sutured into place using 2 stitches of 2-0 Ethilon. There are no signs of swelling or bleeding at the puncture site. A dry sterile dressing was applied. Follow-up radiograph demonstrates satisfactory positioning of the catheter with the tip overlying the SVC/RA junction. No tortuosity or kinking detected. Mr. Trever Nesbitt tolerated the procedure well and was transferred in satisfactory condition. FINDINGS: Widely patent right internal jugular vein. Follow-up fluoroscopic spot image demonstrates satisfactory positioning of the 14 [place] by 20 cm temporary hemodialysis catheter. Satisfactory insertion of a temporary hemodialysis catheter; the catheter is ready for use. Ir Ultrasound Guidance Vascular Access    Result Date: 6/25/2020  PROCEDURE: IR FLUOROSCOPY GUIDED CENTRAL VENOUS ACCESS DEVICE PLACEMENT; US GUIDED VASCULAR ACCESS MODERATE CONSCIOUS SEDATION 6/25/2020 HISTORY: ORDERING SYSTEM PROVIDED HISTORY: temporary dialysis catheter placement TECHNOLOGIST PROVIDED HISTORY: Reason for exam:->temporary dialysis catheter placement renal failure with elevated creatinine and potassium. History of drug use. TECHNIQUE: Ultrasound guided insertion of a temporary hemodialysis catheter via the right internal jugular vein. CONTRAST: No iodinated contrast was utilized during the study. SEDATION: No intravenous sedation. FLUOROSCOPY DOSE AND TYPE OR TIME AND EXPOSURES: 0.4 minutes of fluoroscopy time, DAP 4558 mGy cm squared. Air Kerma 12 mGy. DESCRIPTION OF PROCEDURE: I discussed the alternatives, benefits and risks of the procedure with Mr. Trever Nesbitt; all of his questions were answered and he consents. We discussed the nature of kidney function, renal failure, and the need for hemodialysis currently. The patient was placed supine on the angiography table and patient identification time out was performed.   The right side of the neck was evaluated, the internal jugular vein is widely patent and images were obtained; an appropriate area of the skin was marked, the skin was prepared and draped with sterile technique. Maximum sterile barrier technique was utilized. The skin and subcutaneous tissues were anesthetized with 1% lidocaine using a 25 gauge needle. A 3 mm skin nick was made. Using ultrasound guidance, a 21 gauge needle was placed into the right internal jugular vein and there was blood return. A 0.018 inch guidewire was inserted through the needle, fluoroscopy confirms satisfactory position into the superior vena cava. The needle was exchanged for a micropuncture kit. The wire was advanced to the SVC/RA junction and the appropriate length for a temporary hemodialysis catheter was measured at 20 cm. The catheter was exchanged over a 0.035 inch [employer] guidewire, the tract was dilated and a 14 [place] by 20 cm dual lumen hemodialysis catheter was inserted until its tip was in the central superior vena cava, near the junction with the right atrium. There was low resistance aspiration and injection of fluid, satisfactory flow for hemodialysis. Each lumen was flushed with 10 mL of heparinized saline then instilled with heparin 1000 units/mL: 1.5 mL into the \"arterial\" red lumen and 1.6 mL into the blue \"venous\" lumen. The catheter was sutured into place using 2 stitches of 2-0 Ethilon. There are no signs of swelling or bleeding at the puncture site. A dry sterile dressing was applied. Follow-up radiograph demonstrates satisfactory positioning of the catheter with the tip overlying the SVC/RA junction. No tortuosity or kinking detected. Mr. Aparna Doyle tolerated the procedure well and was transferred in satisfactory condition. FINDINGS: Widely patent right internal jugular vein. Follow-up fluoroscopic spot image demonstrates satisfactory positioning of the 14 [place] by 20 cm temporary hemodialysis catheter. Satisfactory insertion of a temporary hemodialysis catheter; the catheter is ready for use.        Objective: Vitals: /72   Pulse 78   Temp 98.3 °F (36.8 °C) (Oral)   Resp 16   Ht 5' 8\" (1.727 m)   Wt 162 lb 3 oz (73.6 kg)   SpO2 98%   BMI 24.66 kg/m²   General appearance: appears stated age   Skin:  No rashes or lesions  HEENT: Head: Normocephalic, no lesions, without obvious abnormality. Neck: no adenopathy, no carotid bruit, no JVD, supple, symmetrical, trachea midline and thyroid not enlarged, symmetric, no tenderness/mass/nodules  Lungs: clear to auscultation bilaterally  Heart: regular rate and rhythm, S1, S2 normal, no murmur, click, rub or gallop  Abdomen: soft, non-tender; bowel sounds normal; no masses,  no organomegaly  Extremities: extremities normal, atraumatic, no cyanosis or edema  Neurologic: Mental status: Alert, oriented, thought content appropriate    Assessment:   Patient Active Problem List:     Acute renal failure (ARF) (H[facility])     History of drug use     Non-traumatic rhabdomyolysis     Hyperkalemia    Plan:   1.   Acute kidney injury.  Acute kidney injury secondary to multiple factors including volume      depletion in the setting of use of large amount of nonsteroidal anti-inflammatory drugs along rhabdomyolysis contributing the patient's renal failure.  Patient has had improved urinary output.  Renal numbers still high , GFR at 11-12 cc/min - continue dialysis      2.   Hyperkalemia.  Hyperkalemia secondary to acute kidney injury.  Improved. .     3.   Metabolic acidosis.  Patient with increased anion gap metabolic acidosis with a delta anion gap       to delta bicarbonate ratio close to 1.  This was secondary to acute kidney injury.  Bicarbonate       levels are now normal..     4.   Rhabdomyolysis.  Rhabdomyolysis most likely due to his cocaine use.  CPK levels lower. 273        5.  Hyperphosphatemia.  This reflects acute kidney injury and rhabdomyolysis.  improving            Continue present care     Haroon Ham

## 2020-06-30 NOTE — PROGRESS NOTES
Pharmacy Consultation Note  (Antibiotic Dosing and Monitoring)    Initial consult date:   Consulting physician: Dr José De Los Santos  Drug(s): Vancomycin IV  Indication: Intramuscular abscess in IVDA    Ht Readings from Last 1 Encounters:   20 5' 8\" (1.727 m)     Wt Readings from Last 1 Encounters:   20 162 lb 3 oz (73.6 kg)       Age/  Gender ActualBW IBW  Allergy Information   54 y.o.     male 68 kg 68.4 kg  Patient has no known allergies. Date  WBC BUN/CR UOP Drug/Dose Time   Given Level(s)   (Time) Comments     (#1) 8.5 143/10.8 -- Vancomycin 1,000 mg one time during last hour of dialysis   (#2) 7.3 65/6.7 0.68 mL/kg/hr Vancomycin 750 mg IV once 1634 Random AM level = 14.7 mcg/mL       (#3) 9.3 43/5.1 1.06 mL/kg/hr Vancomycin 750 mg IV once 1704 Random AM level = 20.9 mcg/mL      (#4) 9.2 37/4.6 1.24 mL/kg/hr Vancomycin 750 mg IV once 1709 Random AM level = 15.2 mcg/mL      (#5) 9.8 47/5.0  *HD x 180 min* -- Vancomycin 750 mg IV x 1 after dialysis 1738 Pre-HD level = 17.3 mcg/mL @ 0518      (#6) 8.9 30/3.9 -- No HD, no dose --       (#7)      <0600>      Estimated Creatinine Clearance: 23 mL/min (A) (based on SCr of 3.9 mg/dL (H)). UOP over the past 24 hours:       Intake/Output Summary (Last 24 hours) at 2020 1427  Last data filed at 2020 1834  Gross per 24 hour   Intake 420 ml   Output 800 ml   Net -380 ml       Temp max: Temp (24hrs), Av.2 °F (36.8 °C), Min:97.9 °F (36.6 °C), Max:98.5 °F (36.9 °C)      Antibiotic Regimen:  Antibiotic Dose Date Initiated   Zosyn   3.375 g IV Q12H      Cultures:  available culture and sensitivity results were reviewed in EPIC  Culture Date Result    Blood cx #1    NGTD   Blood cx #2    NGTD     Assessment:  · Consulted by Dr. José De Los Santos to dose/monitor vancomycin  · Goal trough level:  15-20 mcg/mL  · Pt is a 55 y/o M who presented with concern of a possible skin infection to the buttock. Reports gradually worsening pain and swelling at site and some chills. Hx drug abuse but reports no recent use. Denies injecting into the buttock. Ct concerning for intramuscular abscess. No history of renal disease; presenting in ARF secondary to rhabdo  · Serum creatinine today: 3.9; CrCl ~ 20 mL/min; baseline Scr ~ 0.9  · 6/25: nephrology consulted; patient receiving 3 hours HD  · 6/26: Pre-dialysis level = 14.7 mcg/mL; receiving 3 hours HD. Patient is producing urine as well  · 6/27: Pre-dialysis level 20.9 mcg/mL; receiving another 3 hours dialysis today. · 6/28: No scheduled HD. Random am level = 15.2 mcg/mL.  With continued UOP and improving renal function, patient should be able to tolerate same dose as prior days despite not receiving any HD today  · 6/29: Dialysis scheduled for today, pre-dialysis level this morning = 17.3 mcg/mL    Plan:  · No HD, no dose today  · Random level tomorrow with AM labs  · Follow renal function; per nephrology, still to continue HD  · Pharmacist will follow HD schedule and monitor/adjust dosing as necessary      Thank you for the consult,    Sasha DaleD, BCPS 6/30/2020 2:27 PM   [phone]

## 2020-07-01 LAB
ALBUMIN SERPL-MCNC: 3.4 G/DL (ref 3.5–5.2)
ALP BLD-CCNC: 51 U/L (ref 40–129)
ALT SERPL-CCNC: 71 U/L (ref 0–40)
ANION GAP SERPL CALCULATED.3IONS-SCNC: 11 MMOL/L (ref 7–16)
ANION GAP SERPL CALCULATED.3IONS-SCNC: 12 MMOL/L (ref 7–16)
ANION GAP SERPL CALCULATED.3IONS-SCNC: 12 MMOL/L (ref 7–16)
APTT: 29 SEC (ref 24.5–35.1)
AST SERPL-CCNC: 30 U/L (ref 0–39)
BASOPHILS ABSOLUTE: 0.07 E9/L (ref 0–0.2)
BASOPHILS RELATIVE PERCENT: 0.8 % (ref 0–2)
BILIRUB SERPL-MCNC: 0.4 MG/DL (ref 0–1.2)
BUN BLDV-MCNC: 34 MG/DL (ref 6–20)
BUN BLDV-MCNC: 34 MG/DL (ref 6–20)
BUN BLDV-MCNC: 37 MG/DL (ref 6–20)
CALCIUM SERPL-MCNC: 8.5 MG/DL (ref 8.6–10.2)
CALCIUM SERPL-MCNC: 8.5 MG/DL (ref 8.6–10.2)
CALCIUM SERPL-MCNC: 8.6 MG/DL (ref 8.6–10.2)
CHLORIDE BLD-SCNC: 105 MMOL/L (ref 98–107)
CHLORIDE BLD-SCNC: 106 MMOL/L (ref 98–107)
CHLORIDE BLD-SCNC: 106 MMOL/L (ref 98–107)
CO2: 23 MMOL/L (ref 22–29)
CO2: 24 MMOL/L (ref 22–29)
CO2: 25 MMOL/L (ref 22–29)
CREAT SERPL-MCNC: 4 MG/DL (ref 0.7–1.2)
CREAT SERPL-MCNC: 4.2 MG/DL (ref 0.7–1.2)
CREAT SERPL-MCNC: 4.2 MG/DL (ref 0.7–1.2)
EOSINOPHILS ABSOLUTE: 0.36 E9/L (ref 0.05–0.5)
EOSINOPHILS RELATIVE PERCENT: 4.3 % (ref 0–6)
GFR AFRICAN AMERICAN: 19
GFR AFRICAN AMERICAN: 19
GFR AFRICAN AMERICAN: 20
GFR NON-AFRICAN AMERICAN: 15 ML/MIN/1.73
GFR NON-AFRICAN AMERICAN: 15 ML/MIN/1.73
GFR NON-AFRICAN AMERICAN: 16 ML/MIN/1.73
GLUCOSE BLD-MCNC: 110 MG/DL (ref 74–99)
GLUCOSE BLD-MCNC: 92 MG/DL (ref 74–99)
GLUCOSE BLD-MCNC: 94 MG/DL (ref 74–99)
HCT VFR BLD CALC: 38.1 % (ref 37–54)
HEMOGLOBIN: 12.3 G/DL (ref 12.5–16.5)
IMMATURE GRANULOCYTES #: 0.07 E9/L
IMMATURE GRANULOCYTES %: 0.8 % (ref 0–5)
INR BLD: 1
LYMPHOCYTES ABSOLUTE: 1.13 E9/L (ref 1.5–4)
LYMPHOCYTES RELATIVE PERCENT: 13.5 % (ref 20–42)
MAGNESIUM: 1.8 MG/DL (ref 1.6–2.6)
MCH RBC QN AUTO: 33.2 PG (ref 26–35)
MCHC RBC AUTO-ENTMCNC: 32.3 % (ref 32–34.5)
MCV RBC AUTO: 103 FL (ref 80–99.9)
METER GLUCOSE: 142 MG/DL (ref 74–99)
MONOCYTES ABSOLUTE: 0.59 E9/L (ref 0.1–0.95)
MONOCYTES RELATIVE PERCENT: 7 % (ref 2–12)
NEUTROPHILS ABSOLUTE: 6.16 E9/L (ref 1.8–7.3)
NEUTROPHILS RELATIVE PERCENT: 73.6 % (ref 43–80)
PDW BLD-RTO: 13.1 FL (ref 11.5–15)
PHOSPHORUS: 5 MG/DL (ref 2.5–4.5)
PLATELET # BLD: 193 E9/L (ref 130–450)
PMV BLD AUTO: 10.5 FL (ref 7–12)
POTASSIUM SERPL-SCNC: 3.7 MMOL/L (ref 3.5–5)
POTASSIUM SERPL-SCNC: 4.4 MMOL/L (ref 3.5–5)
POTASSIUM SERPL-SCNC: 4.5 MMOL/L (ref 3.5–5)
PROTHROMBIN TIME: 11.6 SEC (ref 9.3–12.4)
RBC # BLD: 3.7 E12/L (ref 3.8–5.8)
SODIUM BLD-SCNC: 140 MMOL/L (ref 132–146)
SODIUM BLD-SCNC: 142 MMOL/L (ref 132–146)
SODIUM BLD-SCNC: 142 MMOL/L (ref 132–146)
TOTAL CK: 172 U/L (ref 20–200)
TOTAL PROTEIN: 5.7 G/DL (ref 6.4–8.3)
VANCOMYCIN RANDOM: 12.9 MCG/ML (ref 5–40)
WBC # BLD: 8.4 E9/L (ref 4.5–11.5)

## 2020-07-01 PROCEDURE — 6360000002 HC RX W HCPCS: Performed by: INTERNAL MEDICINE

## 2020-07-01 PROCEDURE — 2580000003 HC RX 258: Performed by: INTERNAL MEDICINE

## 2020-07-01 PROCEDURE — 82962 GLUCOSE BLOOD TEST: CPT

## 2020-07-01 PROCEDURE — 84100 ASSAY OF PHOSPHORUS: CPT

## 2020-07-01 PROCEDURE — 36415 COLL VENOUS BLD VENIPUNCTURE: CPT

## 2020-07-01 PROCEDURE — 1200000000 HC SEMI PRIVATE

## 2020-07-01 PROCEDURE — 82550 ASSAY OF CK (CPK): CPT

## 2020-07-01 PROCEDURE — 85730 THROMBOPLASTIN TIME PARTIAL: CPT

## 2020-07-01 PROCEDURE — 83735 ASSAY OF MAGNESIUM: CPT

## 2020-07-01 PROCEDURE — 80053 COMPREHEN METABOLIC PANEL: CPT

## 2020-07-01 PROCEDURE — 99232 SBSQ HOSP IP/OBS MODERATE 35: CPT | Performed by: INTERNAL MEDICINE

## 2020-07-01 PROCEDURE — 85610 PROTHROMBIN TIME: CPT

## 2020-07-01 PROCEDURE — 80202 ASSAY OF VANCOMYCIN: CPT

## 2020-07-01 PROCEDURE — 85025 COMPLETE CBC W/AUTO DIFF WBC: CPT

## 2020-07-01 PROCEDURE — 6370000000 HC RX 637 (ALT 250 FOR IP): Performed by: INTERNAL MEDICINE

## 2020-07-01 PROCEDURE — 80048 BASIC METABOLIC PNL TOTAL CA: CPT

## 2020-07-01 RX ORDER — OXYCODONE HYDROCHLORIDE AND ACETAMINOPHEN 5; 325 MG/1; MG/1
1 TABLET ORAL EVERY 4 HOURS PRN
Status: DISCONTINUED | OUTPATIENT
Start: 2020-07-01 | End: 2020-07-03 | Stop reason: HOSPADM

## 2020-07-01 RX ORDER — OXYCODONE HYDROCHLORIDE AND ACETAMINOPHEN 5; 325 MG/1; MG/1
2 TABLET ORAL EVERY 4 HOURS PRN
Status: DISCONTINUED | OUTPATIENT
Start: 2020-07-01 | End: 2020-07-03 | Stop reason: HOSPADM

## 2020-07-01 RX ORDER — FENTANYL CITRATE 50 UG/ML
50 INJECTION, SOLUTION INTRAMUSCULAR; INTRAVENOUS
Status: DISCONTINUED | OUTPATIENT
Start: 2020-07-01 | End: 2020-07-02

## 2020-07-01 RX ADMIN — OXYCODONE AND ACETAMINOPHEN 2 TABLET: 5; 325 TABLET ORAL at 17:13

## 2020-07-01 RX ADMIN — FENTANYL CITRATE 50 MCG: 50 INJECTION INTRAMUSCULAR; INTRAVENOUS at 05:18

## 2020-07-01 RX ADMIN — HEPARIN SODIUM 5000 UNITS: 5000 INJECTION, SOLUTION INTRAVENOUS; SUBCUTANEOUS at 08:18

## 2020-07-01 RX ADMIN — VANCOMYCIN HYDROCHLORIDE 750 MG: 10 INJECTION, POWDER, LYOPHILIZED, FOR SOLUTION INTRAVENOUS at 15:07

## 2020-07-01 RX ADMIN — FENTANYL CITRATE 50 MCG: 50 INJECTION INTRAMUSCULAR; INTRAVENOUS at 09:22

## 2020-07-01 RX ADMIN — SODIUM CHLORIDE: 9 INJECTION, SOLUTION INTRAVENOUS at 02:44

## 2020-07-01 RX ADMIN — FENTANYL CITRATE 50 MCG: 50 INJECTION INTRAMUSCULAR; INTRAVENOUS at 10:36

## 2020-07-01 RX ADMIN — FENTANYL CITRATE 50 MCG: 50 INJECTION INTRAMUSCULAR; INTRAVENOUS at 07:52

## 2020-07-01 RX ADMIN — FENTANYL CITRATE 50 MCG: 50 INJECTION INTRAMUSCULAR; INTRAVENOUS at 02:43

## 2020-07-01 RX ADMIN — FENTANYL CITRATE 50 MCG: 50 INJECTION, SOLUTION INTRAMUSCULAR; INTRAVENOUS at 14:17

## 2020-07-01 RX ADMIN — OXYCODONE AND ACETAMINOPHEN 2 TABLET: 5; 325 TABLET ORAL at 12:36

## 2020-07-01 RX ADMIN — OXYCODONE HYDROCHLORIDE AND ACETAMINOPHEN 1 TABLET: 5; 325 TABLET ORAL at 21:41

## 2020-07-01 RX ADMIN — HEPARIN SODIUM 5000 UNITS: 5000 INJECTION, SOLUTION INTRAVENOUS; SUBCUTANEOUS at 21:01

## 2020-07-01 RX ADMIN — PIPERACILLIN AND TAZOBACTAM 3.38 G: 3; .375 INJECTION, POWDER, FOR SOLUTION INTRAVENOUS at 09:22

## 2020-07-01 RX ADMIN — FENTANYL CITRATE 50 MCG: 50 INJECTION, SOLUTION INTRAMUSCULAR; INTRAVENOUS at 19:12

## 2020-07-01 RX ADMIN — PIPERACILLIN AND TAZOBACTAM 3.38 G: 3; .375 INJECTION, POWDER, FOR SOLUTION INTRAVENOUS at 22:04

## 2020-07-01 RX ADMIN — FENTANYL CITRATE 50 MCG: 50 INJECTION INTRAMUSCULAR; INTRAVENOUS at 03:44

## 2020-07-01 RX ADMIN — SODIUM CHLORIDE: 9 INJECTION, SOLUTION INTRAVENOUS at 05:22

## 2020-07-01 ASSESSMENT — PAIN SCALES - GENERAL
PAINLEVEL_OUTOF10: 5
PAINLEVEL_OUTOF10: 0
PAINLEVEL_OUTOF10: 7
PAINLEVEL_OUTOF10: 0
PAINLEVEL_OUTOF10: 7
PAINLEVEL_OUTOF10: 3
PAINLEVEL_OUTOF10: 0
PAINLEVEL_OUTOF10: 0
PAINLEVEL_OUTOF10: 7
PAINLEVEL_OUTOF10: 5
PAINLEVEL_OUTOF10: 7

## 2020-07-01 ASSESSMENT — PAIN DESCRIPTION - PROGRESSION
CLINICAL_PROGRESSION: NOT CHANGED
CLINICAL_PROGRESSION: NOT CHANGED

## 2020-07-01 ASSESSMENT — PAIN DESCRIPTION - PAIN TYPE
TYPE: ACUTE PAIN

## 2020-07-01 ASSESSMENT — PAIN - FUNCTIONAL ASSESSMENT
PAIN_FUNCTIONAL_ASSESSMENT: ACTIVITIES ARE NOT PREVENTED
PAIN_FUNCTIONAL_ASSESSMENT: ACTIVITIES ARE NOT PREVENTED

## 2020-07-01 ASSESSMENT — PAIN DESCRIPTION - ONSET
ONSET: ON-GOING
ONSET: ON-GOING

## 2020-07-01 ASSESSMENT — PAIN DESCRIPTION - FREQUENCY
FREQUENCY: CONTINUOUS

## 2020-07-01 ASSESSMENT — PAIN DESCRIPTION - ORIENTATION
ORIENTATION: LEFT

## 2020-07-01 ASSESSMENT — PAIN DESCRIPTION - DESCRIPTORS
DESCRIPTORS: ACHING;CONSTANT;DISCOMFORT
DESCRIPTORS: ACHING;DISCOMFORT;DULL
DESCRIPTORS: ACHING;CONSTANT;DISCOMFORT
DESCRIPTORS: ACHING;CONSTANT;DISCOMFORT;DULL
DESCRIPTORS: ACHING;CONSTANT;DISCOMFORT

## 2020-07-01 ASSESSMENT — PAIN DESCRIPTION - LOCATION
LOCATION: HIP;LEG
LOCATION: HIP;LEG
LOCATION: HIP

## 2020-07-01 ASSESSMENT — PAIN DESCRIPTION - DIRECTION: RADIATING_TOWARDS: DOWN TO KNEE

## 2020-07-01 NOTE — PROGRESS NOTES
Pharmacy Consultation Note  (Antibiotic Dosing and Monitoring)    Initial consult date:   Consulting physician: Dr Ronna Beach  Drug(s): Vancomycin IV  Indication: Intramuscular abscess in IVDA    Ht Readings from Last 1 Encounters:   20 5' 8\" (1.727 m)     Wt Readings from Last 1 Encounters:   20 152 lb 11.2 oz (69.3 kg)       Age/  Gender ActualBW IBW  Allergy Information   54 y.o.     male 68 kg 68.4 kg  Patient has no known allergies. Date  WBC BUN/CR UOP Drug/Dose Time   Given Level(s)   (Time) Comments     (#1) 8.5 143/10.8 -- Vancomycin 1,000 mg one time during last hour of dialysis   (#2) 7.3 65/6.7 0.68 mL/kg/hr Vancomycin 750 mg IV once 1634 Random AM level = 14.7 mcg/mL       (#3) 9.3 43/5.1 1.06 mL/kg/hr Vancomycin 750 mg IV once 1704 Random AM level = 20.9 mcg/mL      (#4) 9.2 37/4.6 1.24 mL/kg/hr Vancomycin 750 mg IV once 1709 Random AM level = 15.2 mcg/mL      (#5) 9.8 47/5.0  *HD x 180 min* -- Vancomycin 750 mg IV x 1 after dialysis 1738 Pre-HD level = 17.3 mcg/mL @ 0518      (#6) 8.9 30/3.9 -- No HD, no dose --       (#7) 8.4 37/4.2 -- Vancomycin 750 mg IV x 1 <1500> 12.9 mcg/mL @ 0717      Estimated Creatinine Clearance: 21 mL/min (A) (based on SCr of 4.2 mg/dL (H)).   UOP over the past 24 hours:       Intake/Output Summary (Last 24 hours) at 2020 1355  Last data filed at 2020 0551  Gross per 24 hour   Intake 2690 ml   Output 1875 ml   Net 815 ml       Temp max: Temp (24hrs), Av.5 °F (36.9 °C), Min:98.4 °F (36.9 °C), Max:98.8 °F (37.1 °C)      Antibiotic Regimen:  Antibiotic Dose Date Initiated   Zosyn   3.375 g IV Q12H      Cultures:  available culture and sensitivity results were reviewed in EPIC  Culture Date Result    Blood cx #1    NGTD   Blood cx #2    NGTD     Assessment:  · Consulted by Dr. Ronna Beach to dose/monitor vancomycin  · Goal trough level:  15-20 mcg/mL  · Pt is a 53 y/o M who presented with

## 2020-07-01 NOTE — CARE COORDINATION
Ss note: 7/1/2020 10:52 AM No covid testing. Per QFR today, pts kidney function is worse. SW following with nephrology to determine if pt will require outpt HD at discharge. Renzo from [facility] ([address] are close to pts home) is following pt. Per chart review pt has spoken w/Mekhi from [facility], agreeable to outpt treatment with [facility], assessment appt Monday July 6th, at 12:00 pm placed on dc instructions. Pt has hx of cocaine & heroin addictions, [facility] [id number]. Sw/cm will need to arrange a [facility] PCP for pt prior to discharge.  EDWARDO Almaguer

## 2020-07-01 NOTE — PROGRESS NOTES
Nephrology Note  Eladio Lynch MD          Patient seen and examined. No family member is present at bedside. Chart reviewed. Patient is feeling better. Denies shortness of breath. Appetite good. No nausea or dysguesia. Awake and alert . In no acute distress. Vital SignsBP (!) 140/88   Pulse 66   Temp 98.4 °F (36.9 °C) (Oral)   Resp 18   Ht 5' 8\" (1.727 m)   Wt 152 lb 11.2 oz (69.3 kg)   SpO2 98%   BMI 23.22 kg/m²   24HR INTAKE/OUTPUT:    Intake/Output Summary (Last 24 hours) at 7/1/2020 1311  Last data filed at 7/1/2020 8337  Gross per 24 hour   Intake 2690 ml   Output 1875 ml   Net 815 ml         Physical Exam    Neck: No JVD, right-sided temporary hemodialysis catheter in place. Lungs: Breath sounds decreased at the bases. No rales or ronchi. Heart: Regular rate and rhythm. No S3 gallop. No murmrur. Abdomen: Soft non distended, non tender and normal bowel sounds. Extremeties: No pedal edema.   There is erythematous indurated and tender area in the left gluteal region      ROS:  RESPIRATORY:  negative  CARDIOVASCULAR:  negative  GASTROINTESTINAL:  negative  GENITOURINARY:  Negative        Current Facility-Administered Medications   Medication Dose Route Frequency Provider Last Rate Last Dose    fentaNYL (SUBLIMAZE) injection 50 mcg  50 mcg Intravenous Q2H PRN Sarah Alonzo MD        oxyCODONE-acetaminophen (PERCOCET) 5-325 MG per tablet 1 tablet  1 tablet Oral Q4H PRN Sarah Alonzo MD        Or    oxyCODONE-acetaminophen (PERCOCET) 5-325 MG per tablet 2 tablet  2 tablet Oral Q4H PRN Sarah Alonzo MD   2 tablet at 07/01/20 1236    nicotine (NICODERM CQ) 21 MG/24HR 1 patch  1 patch Transdermal Daily Olimpia Felipe MD   1 patch at 07/01/20 0818    ondansetron (ZOFRAN) injection 4 mg  4 mg Intravenous Q6H PRN Nick Sanabria MD   4 mg at 06/25/20 1315    glucose (GLUTOSE) 40 % oral gel 15 g  15 g Oral PRN Nick Sanabria MD        dextrose 50 % IV solution  12.5 g left   posterior pararenal fascia, likely related to the above process. 3. No significant change in asymmetric enlargement of the left gluteus   beverly muscle potentially due to myositis or intramuscular hematoma. 4. No evident loculated fluid to suggest abscess, although evaluation   especially in the soft tissues is partially limited by lack of intravenous   contrast administration. CT PELVIS WO CONTRAST Additional Contrast? None   Final Result   Limited evaluation without intravenous contrast.      Left buttock inflammatory changes. No subcutaneous discrete fluid collection. Enlarged left gluteus beverly muscle likely related to infection. An   intramuscular abscess is not excluded. IR FLUORO GUIDED CVA DEVICE PLMT/REPLACE/REMOVAL   Final Result   Satisfactory insertion of a temporary hemodialysis catheter; the catheter is   ready for use. IR ULTRASOUND GUIDANCE VASCULAR ACCESS   Final Result   Satisfactory insertion of a temporary hemodialysis catheter; the catheter is   ready for use. Labs:    CBC:   Recent Labs     06/29/20  0518 06/30/20  0725 07/01/20  0717   WBC 9.8 8.9 8.4   HGB 12.8 12.2* 12.3*    156 193        No results found for: IRON, TIBC, FERRITIN    Lab Results   Component Value Date    CALCIUM 8.5 (L) 07/01/2020    CALCIUM 8.6 07/01/2020    CALCIUM 8.4 (L) 06/30/2020    PHOS 5.0 (H) 07/01/2020    PHOS 4.9 (H) 06/30/2020    PHOS 5.7 (H) 06/29/2020    MG 1.8 07/01/2020    MG 1.9 06/30/2020    MG 1.7 06/29/2020       BMP:   Recent Labs     06/30/20  2311 07/01/20  0717 07/01/20  1115    142 142   K 4.2 3.7 4.4    105 106   CO2 24 25 24   BUN 35* 34* 37*   CREATININE 4.2* 4.2* 4.2*   GLUCOSE 116* 110* 92             Assessment: / Plan:       1. Acute kidney injury.   Acute kidney injury secondary to multiple factors including volume depletion in the setting of use of large amount of nonsteroidal anti-inflammatory drugs along with possible component of rhabdomyolysis contributing the patient's renal failure. Serum serology was negative. Patient has had improved urinary output. Will will keep the dialysis on hold. Have a temporary hemodialysis catheter removed and if patient does indeed need ongoing hemodialysis we will plan on a tunneled hemodialysis catheter.     2. Hyperkalemia. Hyperkalemia secondary to acute kidney injury. Improved. .     3. Metabolic acidosis. Patient with increased anion gap metabolic acidosis with a delta anion gap to delta bicarbonate ratio close to 1. This was secondary to acute kidney injury. Bicarbonate levels are at target of 22 mmol/L.     4. Hyperphosphatemia. This reflects acute kidney injury and rhabdomyolysis. Improved. Expect phosphorus levels to improve with increased solute clearance. No indication for use of binders at this time.     5. Rhabdomyolysis. Rhabdomyolysis most likely due to his cocaine use. CPK levels lower.                 Ashley Willis MD  Nephrology  Electronically signed by Ashley Willis MD on 7/1/2020 at 1:10 PM      Note: This report was completed utilizing computer voice recognition software. Every effort has been made to ensure accuracy, however; inadvertent computerized transcription errors may be present.

## 2020-07-01 NOTE — PROGRESS NOTES
[address] [place], [place] [address]ist   Progress Note    Admitting Date and Time: 6/25/2020 10:54 AM  Admit Dx: Acute renal failure (ARF) ([facility] [N17.9]  Acute renal failure (ARF) ([facility] [address] [N17.9]    Subjective:    6/29: Pt feels about the same this morning. Pain improved with increased frequency of fentanyl. Discussed that after IV antibiotics are discontinued, he may need tunneled dialysis catheter prior to discharge to home for outpatient dialysis. 6/30: Patient still having soreness in left buttocks but improving from admission. 7/1: Pt feels about same but thinks he can move the left thigh better.     Per RN: he was using Fentanyl every hour until I decreased frequency and        vancomycin  750 mg Intravenous Once    nicotine  1 patch Transdermal Daily    sodium chloride flush  10 mL Intravenous 2 times per day    heparin (porcine)  5,000 Units Subcutaneous BID    vancomycin (VANCOCIN) intermittent dosing (placeholder)   Other RX Placeholder    piperacillin-tazobactam  3.375 g Intravenous Q12H     fentanNYL, 50 mcg, Q2H PRN  oxyCODONE-acetaminophen, 1 tablet, Q4H PRN    Or  oxyCODONE-acetaminophen, 2 tablet, Q4H PRN  ondansetron, 4 mg, Q6H PRN  glucose, 15 g, PRN  dextrose, 12.5 g, PRN  glucagon (rDNA), 1 mg, PRN  dextrose, 100 mL/hr, PRN  sodium chloride flush, 10 mL, PRN  acetaminophen, 650 mg, Q6H PRN    Or  acetaminophen, 650 mg, Q6H PRN  polyethylene glycol, 17 g, Daily PRN  promethazine, 12.5 mg, Q6H PRN    Or  ondansetron, 4 mg, Q6H PRN         Objective:    BP (!) 140/88   Pulse 66   Temp 98.4 °F (36.9 °C) (Oral)   Resp 18   Ht 5' 8\" (1.727 m)   Wt 152 lb 11.2 oz (69.3 kg)   SpO2 98%   BMI 23.22 kg/m²   General Appearance: alert and in no acute distress  Skin: warm and dry, scratches on the back, no rash  Head: normocephalic and atraumatic  Eyes: pupils equal, round, and reactive to light, extraocular eye movements intact, conjunctivae normal  Neck: RIJ temporary dialysis catheter 11,090 (H) 6,656 (H) 5,652 (H) 2,365 (H) 820 (H) 471 (H) 273 (H)     Radiology:   CT PELVIS WO CONTRAST Additional Contrast? None   Final Result   1. No significant change in subcutaneous stranding involving the left flank,   left groin, left hip, left buttock, and proximal left thigh. Considerations   include cellulitis, edema, and hemorrhage. 2. Increased stranding or fluid in the presacral fascia and inferior left   posterior pararenal fascia, likely related to the above process. 3. No significant change in asymmetric enlargement of the left gluteus   beverly muscle potentially due to myositis or intramuscular hematoma. 4. No evident loculated fluid to suggest abscess, although evaluation   especially in the soft tissues is partially limited by lack of intravenous   contrast administration. CT PELVIS WO CONTRAST Additional Contrast? None   Final Result   Limited evaluation without intravenous contrast.      Left buttock inflammatory changes. No subcutaneous discrete fluid collection. Enlarged left gluteus beverly muscle likely related to infection. An   intramuscular abscess is not excluded. IR FLUORO GUIDED CVA DEVICE PLMT/REPLACE/REMOVAL   Final Result   Satisfactory insertion of a temporary hemodialysis catheter; the catheter is   ready for use. IR ULTRASOUND GUIDANCE VASCULAR ACCESS   Final Result   Satisfactory insertion of a temporary hemodialysis catheter; the catheter is   ready for use. Assessment/Plan:  Principal Problem:    Acute renal failure (ARF) (HCC)  Active Problems:    Non-traumatic rhabdomyolysis    Substance abuse (HCC)    Hyperkalemia  Resolved Problems:    * No resolved hospital problems. *      1. Acute kidney injury  -improved with HD; last treatment 6/29 and no plans for today. Cr last few days 3.9-->4.2  -urine output improved. -nephrology to reassess dialysis need on daily basis but no plans for today. Following labs.  Holding off on decision for tunneled HD catheter for now. 2. Nontraumatic vs traumatic rhabdomyolysis   -unable to be determined. He denies fall but clinically appears to be injury and therefore would be traumatic etiology but if cocaine use etiology then would be nontraumatic.     3. Hyperkalemia with EKG changes   -resolved with HD      4. Left buttock erythema and induration    -no clinical signs of abscess per general surgery   -on antibiotic coverage Day #7 of Vanco and Zosyn. -repeat CT for comparison per general surgery does not show evidence of abscess  -continue antibiotics through 7/1.      5. Left buttock/thigh pain  -decrease Fentanyl to q2 prn. Added Percocet pain panel. 6. Substance abuse  -urine toxicology screen, patient is not very forthcoming about use history, states he hasn't used in [place] long time\" but admitted to taking hit off pipe but did not get buzz.  -urine toxicology positive for cocaine and fentanyl  -counseled about drug use       NOTE: This report was transcribed using voice recognition software. Every effort was made to ensure accuracy; however, inadvertent computerized transcription errors may be present.      Electronically signed by Ramin Soliz MD on 7/1/2020 at 2:30 PM

## 2020-07-02 ENCOUNTER — TELEPHONE (OUTPATIENT)
Dept: ADMINISTRATIVE | Age: 45
End: 2020-07-02

## 2020-07-02 LAB
ALBUMIN SERPL-MCNC: 3.1 G/DL (ref 3.5–5.2)
ALP BLD-CCNC: 46 U/L (ref 40–129)
ALT SERPL-CCNC: 55 U/L (ref 0–40)
ANION GAP SERPL CALCULATED.3IONS-SCNC: 11 MMOL/L (ref 7–16)
ANION GAP SERPL CALCULATED.3IONS-SCNC: 13 MMOL/L (ref 7–16)
AST SERPL-CCNC: 23 U/L (ref 0–39)
BASOPHILS ABSOLUTE: 0.06 E9/L (ref 0–0.2)
BASOPHILS RELATIVE PERCENT: 0.8 % (ref 0–2)
BILIRUB SERPL-MCNC: 0.4 MG/DL (ref 0–1.2)
BUN BLDV-MCNC: 32 MG/DL (ref 6–20)
BUN BLDV-MCNC: 33 MG/DL (ref 6–20)
CALCIUM SERPL-MCNC: 8.3 MG/DL (ref 8.6–10.2)
CALCIUM SERPL-MCNC: 8.4 MG/DL (ref 8.6–10.2)
CHLORIDE BLD-SCNC: 106 MMOL/L (ref 98–107)
CHLORIDE BLD-SCNC: 107 MMOL/L (ref 98–107)
CO2: 22 MMOL/L (ref 22–29)
CO2: 23 MMOL/L (ref 22–29)
CREAT SERPL-MCNC: 3.9 MG/DL (ref 0.7–1.2)
CREAT SERPL-MCNC: 4 MG/DL (ref 0.7–1.2)
EOSINOPHILS ABSOLUTE: 0.36 E9/L (ref 0.05–0.5)
EOSINOPHILS RELATIVE PERCENT: 4.7 % (ref 0–6)
GFR AFRICAN AMERICAN: 20
GFR AFRICAN AMERICAN: 20
GFR NON-AFRICAN AMERICAN: 16 ML/MIN/1.73
GFR NON-AFRICAN AMERICAN: 17 ML/MIN/1.73
GLUCOSE BLD-MCNC: 86 MG/DL (ref 74–99)
GLUCOSE BLD-MCNC: 99 MG/DL (ref 74–99)
HCT VFR BLD CALC: 32.7 % (ref 37–54)
HEMOGLOBIN: 11 G/DL (ref 12.5–16.5)
IMMATURE GRANULOCYTES #: 0.06 E9/L
IMMATURE GRANULOCYTES %: 0.8 % (ref 0–5)
LYMPHOCYTES ABSOLUTE: 1.17 E9/L (ref 1.5–4)
LYMPHOCYTES RELATIVE PERCENT: 15.4 % (ref 20–42)
MAGNESIUM: 1.7 MG/DL (ref 1.6–2.6)
MCH RBC QN AUTO: 34.7 PG (ref 26–35)
MCHC RBC AUTO-ENTMCNC: 33.6 % (ref 32–34.5)
MCV RBC AUTO: 103.2 FL (ref 80–99.9)
MONOCYTES ABSOLUTE: 0.67 E9/L (ref 0.1–0.95)
MONOCYTES RELATIVE PERCENT: 8.8 % (ref 2–12)
NEUTROPHILS ABSOLUTE: 5.28 E9/L (ref 1.8–7.3)
NEUTROPHILS RELATIVE PERCENT: 69.5 % (ref 43–80)
PDW BLD-RTO: 13 FL (ref 11.5–15)
PHOSPHORUS: 5.3 MG/DL (ref 2.5–4.5)
PLATELET # BLD: 206 E9/L (ref 130–450)
PMV BLD AUTO: 10.4 FL (ref 7–12)
POTASSIUM REFLEX MAGNESIUM: 4.3 MMOL/L (ref 3.5–5)
POTASSIUM SERPL-SCNC: 4.3 MMOL/L (ref 3.5–5)
RBC # BLD: 3.17 E12/L (ref 3.8–5.8)
SODIUM BLD-SCNC: 140 MMOL/L (ref 132–146)
SODIUM BLD-SCNC: 142 MMOL/L (ref 132–146)
TOTAL CK: 111 U/L (ref 20–200)
TOTAL PROTEIN: 5.2 G/DL (ref 6.4–8.3)
VANCOMYCIN RANDOM: 13.6 MCG/ML (ref 5–40)
WBC # BLD: 7.6 E9/L (ref 4.5–11.5)

## 2020-07-02 PROCEDURE — 80202 ASSAY OF VANCOMYCIN: CPT

## 2020-07-02 PROCEDURE — 2580000003 HC RX 258: Performed by: INTERNAL MEDICINE

## 2020-07-02 PROCEDURE — 99233 SBSQ HOSP IP/OBS HIGH 50: CPT | Performed by: INTERNAL MEDICINE

## 2020-07-02 PROCEDURE — 6360000002 HC RX W HCPCS: Performed by: INTERNAL MEDICINE

## 2020-07-02 PROCEDURE — 83735 ASSAY OF MAGNESIUM: CPT

## 2020-07-02 PROCEDURE — 80053 COMPREHEN METABOLIC PANEL: CPT

## 2020-07-02 PROCEDURE — 6370000000 HC RX 637 (ALT 250 FOR IP): Performed by: INTERNAL MEDICINE

## 2020-07-02 PROCEDURE — 82550 ASSAY OF CK (CPK): CPT

## 2020-07-02 PROCEDURE — 36415 COLL VENOUS BLD VENIPUNCTURE: CPT

## 2020-07-02 PROCEDURE — 84100 ASSAY OF PHOSPHORUS: CPT

## 2020-07-02 PROCEDURE — 1200000000 HC SEMI PRIVATE

## 2020-07-02 PROCEDURE — 80048 BASIC METABOLIC PNL TOTAL CA: CPT

## 2020-07-02 PROCEDURE — 85025 COMPLETE CBC W/AUTO DIFF WBC: CPT

## 2020-07-02 PROCEDURE — 97116 GAIT TRAINING THERAPY: CPT

## 2020-07-02 RX ADMIN — HEPARIN SODIUM 5000 UNITS: 5000 INJECTION, SOLUTION INTRAVENOUS; SUBCUTANEOUS at 20:35

## 2020-07-02 RX ADMIN — OXYCODONE AND ACETAMINOPHEN 2 TABLET: 5; 325 TABLET ORAL at 01:56

## 2020-07-02 RX ADMIN — OXYCODONE AND ACETAMINOPHEN 2 TABLET: 5; 325 TABLET ORAL at 12:29

## 2020-07-02 RX ADMIN — FENTANYL CITRATE 50 MCG: 50 INJECTION, SOLUTION INTRAMUSCULAR; INTRAVENOUS at 00:11

## 2020-07-02 RX ADMIN — SODIUM CHLORIDE: 9 INJECTION, SOLUTION INTRAVENOUS at 02:10

## 2020-07-02 RX ADMIN — FENTANYL CITRATE 50 MCG: 50 INJECTION, SOLUTION INTRAMUSCULAR; INTRAVENOUS at 05:17

## 2020-07-02 RX ADMIN — OXYCODONE AND ACETAMINOPHEN 2 TABLET: 5; 325 TABLET ORAL at 20:34

## 2020-07-02 RX ADMIN — HEPARIN SODIUM 5000 UNITS: 5000 INJECTION, SOLUTION INTRAVENOUS; SUBCUTANEOUS at 08:14

## 2020-07-02 RX ADMIN — OXYCODONE AND ACETAMINOPHEN 2 TABLET: 5; 325 TABLET ORAL at 08:14

## 2020-07-02 RX ADMIN — SODIUM CHLORIDE: 9 INJECTION, SOLUTION INTRAVENOUS at 05:17

## 2020-07-02 RX ADMIN — Medication 10 ML: at 20:35

## 2020-07-02 RX ADMIN — OXYCODONE AND ACETAMINOPHEN 2 TABLET: 5; 325 TABLET ORAL at 16:33

## 2020-07-02 ASSESSMENT — PAIN DESCRIPTION - DESCRIPTORS
DESCRIPTORS: ACHING;CONSTANT;DISCOMFORT
DESCRIPTORS: DULL;ACHING
DESCRIPTORS: ACHING;CONSTANT;DISCOMFORT

## 2020-07-02 ASSESSMENT — PAIN - FUNCTIONAL ASSESSMENT
PAIN_FUNCTIONAL_ASSESSMENT: ACTIVITIES ARE NOT PREVENTED

## 2020-07-02 ASSESSMENT — PAIN SCALES - GENERAL
PAINLEVEL_OUTOF10: 0
PAINLEVEL_OUTOF10: 7
PAINLEVEL_OUTOF10: 7
PAINLEVEL_OUTOF10: 0
PAINLEVEL_OUTOF10: 7
PAINLEVEL_OUTOF10: 0
PAINLEVEL_OUTOF10: 7

## 2020-07-02 ASSESSMENT — PAIN DESCRIPTION - ORIENTATION
ORIENTATION: LEFT

## 2020-07-02 ASSESSMENT — PAIN DESCRIPTION - ONSET
ONSET: ON-GOING

## 2020-07-02 ASSESSMENT — PAIN DESCRIPTION - PAIN TYPE
TYPE: ACUTE PAIN

## 2020-07-02 ASSESSMENT — PAIN DESCRIPTION - PROGRESSION
CLINICAL_PROGRESSION: NOT CHANGED
CLINICAL_PROGRESSION: NOT CHANGED

## 2020-07-02 ASSESSMENT — PAIN DESCRIPTION - LOCATION
LOCATION: HIP;LEG
LOCATION: HIP

## 2020-07-02 ASSESSMENT — PAIN DESCRIPTION - FREQUENCY
FREQUENCY: CONTINUOUS

## 2020-07-02 ASSESSMENT — PAIN DESCRIPTION - DIRECTION: RADIATING_TOWARDS: DOWNWARD

## 2020-07-02 NOTE — PLAN OF CARE
Problem: Pain:  Goal: Pain level will decrease  Description: Pain level will decrease  7/1/2020 2221 by Thomas Martinez RN  Outcome: Met This Shift     Problem: Falls - Risk of:  Goal: Will remain free from falls  Description: Will remain free from falls  7/1/2020 2221 by Thomas Martinez RN  Outcome: Met This Shift

## 2020-07-02 NOTE — PROGRESS NOTES
Nephrology Note  Bailee Briscoe MD          Patient seen and examined with Dr. Kristofer Jefferson  No family member is present at bedside. Chart reviewed. Temporary hemodialysis catheter was removed last night. Patient is feeling better. Denies shortness of breath. Appetite good. No nausea or dysguesia. Awake and alert . In no acute distress. Vital SignsBP 128/86   Pulse 67   Temp 97.6 °F (36.4 °C) (Oral)   Resp 18   Ht 5' 8\" (1.727 m)   Wt 153 lb 3.2 oz (69.5 kg)   SpO2 99%   BMI 23.29 kg/m²   24HR INTAKE/OUTPUT:    Intake/Output Summary (Last 24 hours) at 7/2/2020 1200  Last data filed at 7/2/2020 0916  Gross per 24 hour   Intake 1400 ml   Output 2275 ml   Net -875 ml         Physical Exam    Neck: No JVD, right-sided temporary hemodialysis catheter in place. Lungs: Breath sounds decreased at the bases. No rales or ronchi. Heart: Regular rate and rhythm. No S3 gallop. No murmrur. Abdomen: Soft non distended, non tender and normal bowel sounds.   Extremeties: No pedal edema. Angelina Kail is erythematous indurated and tender area in the left gluteal region        ROS:  RESPIRATORY:  negative  CARDIOVASCULAR:  negative  GASTROINTESTINAL:  negative  GENITOURINARY:  Negative    Current Facility-Administered Medications   Medication Dose Route Frequency Provider Last Rate Last Dose    oxyCODONE-acetaminophen (PERCOCET) 5-325 MG per tablet 1 tablet  1 tablet Oral Q4H PRN Jane Howell MD   1 tablet at 07/01/20 2141    Or    oxyCODONE-acetaminophen (PERCOCET) 5-325 MG per tablet 2 tablet  2 tablet Oral Q4H PRN Jane Howell MD   2 tablet at 07/02/20 0814    nicotine (NICODERM CQ) 21 MG/24HR 1 patch  1 patch Transdermal Daily Rachel Hill MD   1 patch at 07/02/20 0816    ondansetron (ZOFRAN) injection 4 mg  4 mg Intravenous Q6H PRN Kari Dove MD   4 mg at 06/25/20 1315    glucose (GLUTOSE) 40 % oral gel 15 g  15 g Oral PRN Kari Dove MD        dextrose 50 % IV solution  12.5 g Intravenous PRN Sophie Castro MD        glucagon (rDNA) injection 1 mg  1 mg Intramuscular PRN Sophie Castro MD        dextrose 5 % solution  100 mL/hr Intravenous PRN Sophie Castro MD        sodium chloride flush 0.9 % injection 10 mL  10 mL Intravenous 2 times per day Rashard Worley, DO   10 mL at 06/30/20 0841    sodium chloride flush 0.9 % injection 10 mL  10 mL Intravenous PRN Rashard Worley, DO   10 mL at 06/30/20 1014    acetaminophen (TYLENOL) tablet 650 mg  650 mg Oral Q6H PRN Rashard Brandonpovic, DO   650 mg at 06/30/20 0403    Or    acetaminophen (TYLENOL) suppository 650 mg  650 mg Rectal Q6H PRN Rashard Romerovic, DO        polyethylene glycol (GLYCOLAX) packet 17 g  17 g Oral Daily PRN Rashard Romerovic, DO        promethazine (PHENERGAN) tablet 12.5 mg  12.5 mg Oral Q6H PRN Rashard Worley, DO        Or    ondansetron (ZOFRAN) injection 4 mg  4 mg Intravenous Q6H PRN Rashard Romerovic, DO        heparin (porcine) injection 5,000 Units  5,000 Units Subcutaneous BID Rashard Worley, DO   5,000 Units at 07/02/20 0814    0.9 % sodium chloride infusion   Intravenous Q12H Rashard Worley, DO   Stopped at 07/02/20 0410       CT PELVIS WO CONTRAST Additional Contrast? None   Final Result   1. No significant change in subcutaneous stranding involving the left flank,   left groin, left hip, left buttock, and proximal left thigh. Considerations   include cellulitis, edema, and hemorrhage. 2. Increased stranding or fluid in the presacral fascia and inferior left   posterior pararenal fascia, likely related to the above process. 3. No significant change in asymmetric enlargement of the left gluteus   beverly muscle potentially due to myositis or intramuscular hematoma. 4. No evident loculated fluid to suggest abscess, although evaluation   especially in the soft tissues is partially limited by lack of intravenous   contrast administration.          CT PELVIS WO CONTRAST Additional Contrast? None   Final Result   Limited evaluation without intravenous contrast.      Left buttock inflammatory changes. No subcutaneous discrete fluid collection. Enlarged left gluteus beverly muscle likely related to infection. An   intramuscular abscess is not excluded. IR FLUORO GUIDED CVA DEVICE PLMT/REPLACE/REMOVAL   Final Result   Satisfactory insertion of a temporary hemodialysis catheter; the catheter is   ready for use. IR ULTRASOUND GUIDANCE VASCULAR ACCESS   Final Result   Satisfactory insertion of a temporary hemodialysis catheter; the catheter is   ready for use. Labs:    CBC:   Recent Labs     06/30/20  0725 07/01/20  0717 07/02/20  0637   WBC 8.9 8.4 7.6   HGB 12.2* 12.3* 11.0*    193 206        No results found for: IRON, TIBC, FERRITIN    Lab Results   Component Value Date    CALCIUM 8.3 (L) 07/02/2020    CALCIUM 8.5 (L) 07/01/2020    CALCIUM 8.5 (L) 07/01/2020    PHOS 5.3 (H) 07/02/2020    PHOS 5.0 (H) 07/01/2020    PHOS 4.9 (H) 06/30/2020    MG 1.7 07/02/2020    MG 1.8 07/01/2020    MG 1.9 06/30/2020       BMP:   Recent Labs     07/01/20  1115 07/01/20  2257 07/02/20  0637    140 142   K 4.4 4.5 4.3    106 107   CO2 24 23 22   BUN 37* 34* 33*   CREATININE 4.2* 4.0* 4.0*   GLUCOSE 92 94 86             Assessment: / Plan:    1.   Acute kidney injury.  Acute kidney injury secondary to multiple factors including volume depletion in the setting of use of large amount of nonsteroidal anti-inflammatory drugs along with possible component of rhabdomyolysis contributing the patient's renal failure. Serum serology was negative. Patient with good urinary output. Serum creatinine is lower. Will Hep-Lock IV fluids and if the serum creatinine continues to fall he can be discharged home tomorrow. Prescription left with the nurse regarding weekly blood work if discharged tomorrow. Continue to hold any dialysis.       2.   Hyperkalemia.  Hyperkalemia secondary to acute kidney injury.  Improved. .     3.   Metabolic acidosis.  Patient with increased anion gap metabolic acidosis with a delta anion gap to delta bicarbonate ratio close to 1.  This was secondary to acute kidney injury.  Bicarbonate levels are at target of 22 mmol/L.     4.   Hyperphosphatemia.  This reflects acute kidney injury and rhabdomyolysis.    Improved. Expect phosphorus levels to improve with increased solute clearance.  No indication for use of binders at this time.     5.  Rhabdomyolysis.  Rhabdomyolysis most likely due to his cocaine use.  CPK levels lower. Halima Linares MD  Nephrology  Electronically signed by Halima Linares MD on 7/2/2020 at 12:00 PM      Note: This report was completed utilizing computer voice recognition software. Every effort has been made to ensure accuracy, however; inadvertent computerized transcription errors may be present.

## 2020-07-02 NOTE — PROGRESS NOTES
Physical Therapy    Physical Therapy Treatment Note    Room #:  5172/6356-14  Patient Name: Matt Redman  YOB: 1975  MRN: [id number]    Referring Provider: [employer], DO    Date of Service: 7/2/2020    Evaluating Physical Therapist: Adalberto Marion PT  Lic. # [id number]      Diagnosis:   Acute renal failure (ARF) ([address] [N17.9]  Acute renal failure (ARF) (HCC) [N17.9]  Admitted with left leg numbness, right arm pain, hip pain and foot swelling, chills; rhabdomyolysis  Temporary hemodialysis catheter placed    Patient Active Problem List   Diagnosis    Acute renal failure (ARF) ([address]    Substance abuse ([facility]    Non-traumatic rhabdomyolysis    Hyperkalemia        Tentative placement recommendation: Home    Equipment recommendation: Patient has needed equipment       Prior Level of Function: Patient ambulated independently    Rehab Potential: good for baseline    Past medical history:   History reviewed. No pertinent past medical history. History reviewed. No pertinent surgical history. Precautions: falls,      SUBJECTIVE:    Social history: Patient lives with family in a ranch home with 4 steps to enter with [facility] owned: [employer],       [address] [place] [address]   How much difficulty turning over in bed?: None  How much difficulty sitting down on / standing up from a chair with arms?: None  How much difficulty moving from lying on back to sitting on side of bed?: None  How much help from another person moving to and from a bed to a chair?: A Little  How much help from another person needed to walk in hospital room?: A Little  How much help from another person for climbing 3-5 steps with a railing?: A Little  AM-PAC Inpatient Mobility Raw Score : 21  AM-PAC Inpatient T-Scale Score : 50.25  Mobility Inpatient CMS 0-100% Score: 28.97  Mobility Inpatient CMS G-Code Modifier : [address] cleared patient for PT.  Patient complained Patient was returned to bed at end of treatment. Therapeutic Exercises: not performed     At end of session, patient was in bed, call light and phone within reach, all lines were intact, and nursing was notified. ASSESSMENT:  Patient was able to progress ambulation distance with no loss of balance noted. Patient displayed an antalgic gait due to left hip pain. Patient would benefit from continued skilled Physical Therapy to improve functional independence and quality of life. PLAN:    Patient is making good progress towards established goals, will continue with current plan of care.       Time in: 11:03  Time out: 11:12    Total Treatment Time: 9 minutes    CPT codes:  Gait Training (82127) 9 minutes 1 unit(s)    Minda Peng [facility]   LIC# [id number]

## 2020-07-02 NOTE — CARE COORDINATION
Ss SK:6/5/8318 10:27 AM No covid testing. Per QFR  Pt will  most likely not require outpt HD at this time. SW met with pt he has not had a PCP for about 7 yrs. Previous PCP was Dr. Tatiana Hernadez. Arranged new PCP appt with Dr. Dk Harding on July 15th at 10:00 am. Pt to arrive no earlier than 5 mins before appt, come alone, wear mask. If pt is accompanied by someone that person must be free of flu like symptoms and wear a mask. Info placed on d/c summary. Pt has outpt assessment with [facility] on Mon July 6th at 12:00 pm. SW will remain available for any further transition of care needs.  Geraldo Covington, EDWARDO

## 2020-07-02 NOTE — PLAN OF CARE
Problem: Pain:  Goal: Control of acute pain  Description: Control of acute pain  7/2/2020 1256 by Rony Gardner RN  Outcome: Met This Shift     Problem: Falls - Risk of:  Goal: Will remain free from falls  Description: Will remain free from falls  7/2/2020 1256 by Rony Gardner RN  Outcome: Met This Shift

## 2020-07-02 NOTE — PROGRESS NOTES
Nutrition Assessment (Low Risk)    Type and Reason for Visit: Initial    Nutrition Recommendations: Continue current diet    Nutrition Assessment:  Patient assessed for nutritional risk. Deemed to be at low risk at this time. Will continue to monitor for changes in status. Pt admits w/hip pain, appears adequately nourished on admit AEB good p.o. intakes and no sign. weight changes, however at nutritional compromise d/t Acute renal failure requiring hemodialysis, PMHx: Substance abuse. Met w/family initiated diet education. Will continue w/current diet and monitor fro changes in nutrition.     Malnutrition Assessment:  · Malnutrition Status: No malnutrition    Nutrition Risk Level   Risk Level: Low    Nutrition Diagnosis:   · Problem: No nutrition diagnosis at this time    Nutrition Intervention:  Food and/or Delivery: Continue current diet  Nutrition Education/Counseling/Coordination of Care:  Continued Inpatient Monitoring, Education Initiated      Electronically signed by Love Cedillo RD, LD on 7/2/20 at 12:52 PM EDT    Contact Number: [phone]

## 2020-07-02 NOTE — PROGRESS NOTES
Pharmacy Consultation Note  (Antibiotic Dosing and Monitoring)    Initial consult date:   Consulting physician: Dr Missy Meza  Drug(s): Vancomycin IV  Indication: Intramuscular abscess in IVDA    Ht Readings from Last 1 Encounters:   20 5' 8\" (1.727 m)     Wt Readings from Last 1 Encounters:   20 153 lb 3.2 oz (69.5 kg)       Age/  Gender ActualBW IBW  Allergy Information   54 y.o.     male 68 kg 68.4 kg  Patient has no known allergies. Date  WBC BUN/CR UOP Drug/Dose Time   Given Level(s)   (Time) Comments     (#1) 8.5 143/10.8 -- Vancomycin 1,000 mg one time during last hour of dialysis   (#2) 7.3 65/6.7 0.68 mL/kg/hr Vancomycin 750 mg IV once 1634 Random AM level = 14.7 mcg/mL       (#3) 9.3 43/5.1 1.06 mL/kg/hr Vancomycin 750 mg IV once 1704 Random AM level = 20.9 mcg/mL      (#4) 9.2 37/4.6 1.24 mL/kg/hr Vancomycin 750 mg IV once 1709 Random AM level = 15.2 mcg/mL      (#5) 9.8 47/5.0  *HD x 180 min* -- Vancomycin 750 mg IV x 1 after dialysis 1738 Pre-HD level = 17.3 mcg/mL @ 0518      (#6) 8.9 30/3.9 -- No HD, no dose --       (#7) 8.4 37/4.2 -- Vancomycin 750 mg IV x 1 1507 12.9 mcg/mL @ 0717      (#8) 7.6 33/4.0 1.4 mL/kg/hr Vancomycin 750 mg IV x 1 <1200> 13.6 mcg/mL @ 0637      Estimated Creatinine Clearance: 22 mL/min (A) (based on SCr of 4 mg/dL (H)).   UOP over the past 24 hours:       Intake/Output Summary (Last 24 hours) at 2020 1056  Last data filed at 2020 0916  Gross per 24 hour   Intake 1400 ml   Output 2275 ml   Net -875 ml       Temp max: Temp (24hrs), Av.3 °F (36.8 °C), Min:97.6 °F (36.4 °C), Max:98.7 °F (37.1 °C)      Antibiotic Regimen:  Antibiotic Dose Date Initiated   Zosyn   3.375 g IV Q12H      Cultures:  available culture and sensitivity results were reviewed in EPIC  Culture Date Result    Blood cx #1    NGTD   Blood cx #2    NGTD     Assessment:  · Consulted by Dr. Missy Meza to dose/monitor vancomycin  · Goal trough level:  15-20 mcg/mL  · Pt is a 53 y/o M who presented with concern of a possible skin infection to the buttock. Reports gradually worsening pain and swelling at site and some chills. Hx drug abuse but reports no recent use. Denies injecting into the buttock. Ct concerning for intramuscular abscess. No history of renal disease; presenting in ARF secondary to rhabdo  · Serum creatinine today: 4.0; CrCl ~ 20 mL/min; baseline Scr ~ 0.9  · 6/25: nephrology consulted; patient receiving 3 hours HD  · 6/26: Pre-dialysis level = 14.7 mcg/mL; receiving 3 hours HD. Patient is producing urine as well  · 6/27: Pre-dialysis level 20.9 mcg/mL; receiving another 3 hours dialysis today. · 6/28: No scheduled HD. Random am level = 15.2 mcg/mL. With continued UOP and improving renal function, patient should be able to tolerate same dose as prior days despite not receiving any HD today  · 6/29: Dialysis scheduled for today, pre-dialysis level this morning = 17.3 mcg/mL  · 7/1: Random AM level = 12.9 mcg/mL  · 7/2: Random AM level = 13.6 mcg/mL; temp HD cath pulled, nephro assessing dialysis needs on a daily basis and if needed will place tunneled HD cath, for this reason will continue to dose by levels    Plan:  · Vancomycin 750 mg IV x 1  · Random level tomorrow with AM labs  · Follow renal function  · Pharmacist will follow HD schedule and monitor/adjust dosing as necessary      Thank you for the consult,    Leroy Conner PharmD, BCPS 7/2/2020 10:56 AM   [phone]     Addendum    Vancomycin has been discontinued; pharmacy will sign-off. Please reconsult if needed.     Thank you,    Emiliano Deshpande, Myke, BCPS 7/2/2020 11:33 AM   Ext: [phone]

## 2020-07-02 NOTE — PROGRESS NOTES
[address] [place], [place] [address]   Progress Note    Admitting Date and Time: 6/25/2020 10:54 AM  Admit Dx: Acute renal failure (ARF) ([facility] [address] [N17.9]  Acute renal failure (ARF) ([facility] [address] [N17.9]    Subjective:    6/29: Pt feels about the same this morning. Pain improved with increased frequency of fentanyl. Discussed that after IV antibiotics are discontinued, he may need tunneled dialysis catheter prior to discharge to home for outpatient dialysis. 6/30: Patient still having soreness in left buttocks but improving from admission. 7/1: Pt feels about same but thinks he can move the left thigh better. 7/2: R IJ line removed yesterday afternoon. Dr. Ian Dodson of nephrology at bedside talking to patient. No HD indicated today. IVF will be stopped and if creatinine stable ,can be discharged home tomorrow.        nicotine  1 patch Transdermal Daily    sodium chloride flush  10 mL Intravenous 2 times per day    heparin (porcine)  5,000 Units Subcutaneous BID    vancomycin (VANCOCIN) intermittent dosing (placeholder)   Other RX Placeholder     oxyCODONE-acetaminophen, 1 tablet, Q4H PRN    Or  oxyCODONE-acetaminophen, 2 tablet, Q4H PRN  ondansetron, 4 mg, Q6H PRN  glucose, 15 g, PRN  dextrose, 12.5 g, PRN  glucagon (rDNA), 1 mg, PRN  dextrose, 100 mL/hr, PRN  sodium chloride flush, 10 mL, PRN  acetaminophen, 650 mg, Q6H PRN    Or  acetaminophen, 650 mg, Q6H PRN  polyethylene glycol, 17 g, Daily PRN  promethazine, 12.5 mg, Q6H PRN    Or  ondansetron, 4 mg, Q6H PRN         Objective:    /86   Pulse 67   Temp 97.6 °F (36.4 °C) (Oral)   Resp 18   Ht 5' 8\" (1.727 m)   Wt 153 lb 3.2 oz (69.5 kg)   SpO2 99%   BMI 23.29 kg/m²   General Appearance: alert and in no acute distress  Skin: warm and dry, scratches on the back, no rash  Head: normocephalic and atraumatic  Eyes: pupils equal, round, and reactive to light, extraocular eye movements intact, conjunctivae normal  Neck: RIJ temporary dialysis catheter in place with clean dressing  Pulmonary/Chest: clear to auscultation bilaterally- no wheezes, rales or rhonchi, normal air movement, no respiratory distress  Cardiovascular: normal rate, normal S1 and S2 and no carotid bruits  Abdomen: soft, non-tender, non-distended, normal bowel sounds, no masses or organomegaly  Extremities: left buttock erythematous, indurated, tender  Neurologic: no cranial nerve deficit and speech normal       Recent Labs     07/01/20  1115 07/01/20  2257 07/02/20  0637    140 142   K 4.4 4.5 4.3    106 107   CO2 24 23 22   BUN 37* 34* 33*   CREATININE 4.2* 4.0* 4.0*   GLUCOSE 92 94 86   CALCIUM 8.5* 8.5* 8.3*       Recent Labs     06/30/20  0725 07/01/20  0717 07/02/20  0637   ALKPHOS 53 51 46   PROT 5.4* 5.7* 5.2*   LABALBU 3.2* 3.4* 3.1*   BILITOT 0.4 0.4 0.4   AST 42* 30 23   ALT 89* 71* 55*       Recent Labs     06/30/20  0725 07/01/20  0717 07/02/20  0637   WBC 8.9 8.4 7.6   RBC 3.60* 3.70* 3.17*   HGB 12.2* 12.3* 11.0*   HCT 36.6* 38.1 32.7*   .7* 103.0* 103.2*   MCH 33.9 33.2 34.7   MCHC 33.3 32.3 33.6   RDW 13.1 13.1 13.0    193 206   MPV 10.6 10.5 10.4       Hepatitis B Surface Antibody [[id number]] Collected: 06/26/20 0539     Specimen: Blood Updated: 06/29/20 1432      Hep B S Ab Non-Reactive     Hepatitis panel, acute [[id number]] Collected: 06/26/20 0539     Specimen: Blood Updated: 06/29/20 1432      Hep A IgM Non-Reactive      Hep B Core Ab, IgM Non-Reactive      Hep B S Ag Interp Non-Reactive      Hep C Ab Interp Non-Reactive     Anti-DNA Antibody, Double-Stranded [[id number]] Collected: 06/25/20 2353     Specimen: Blood Updated: 06/29/20 1201      Anti ds DNA NEGATIVE     MEEK [[id number]] Collected: 06/26/20 0539     Specimen: Blood Updated: 06/29/20 1152      MEEK NEGATIVE        Ref.  Range 6/25/2020 15:13 6/25/2020 23:53 6/26/2020 05:39 6/27/2020 05:34 6/28/2020 10:36   Total CK Latest Ref Range: 20 - 200 U/L 11,090 (H) 6,656 (H) 5,652 (H) 2,365 (H) 820 (H)          Ref. Range 6/29/2020 05:18 6/30/2020 07:25 7/1/2020 07:17 7/2/2020 06:37   Total CK Latest Ref Range: 20 - 200 U/L 471 (H) 273 (H) 172 111     Radiology:   CT PELVIS WO CONTRAST Additional Contrast? None   Final Result   1. No significant change in subcutaneous stranding involving the left flank,   left groin, left hip, left buttock, and proximal left thigh. Considerations   include cellulitis, edema, and hemorrhage. 2. Increased stranding or fluid in the presacral fascia and inferior left   posterior pararenal fascia, likely related to the above process. 3. No significant change in asymmetric enlargement of the left gluteus   beverly muscle potentially due to myositis or intramuscular hematoma. 4. No evident loculated fluid to suggest abscess, although evaluation   especially in the soft tissues is partially limited by lack of intravenous   contrast administration. CT PELVIS WO CONTRAST Additional Contrast? None   Final Result   Limited evaluation without intravenous contrast.      Left buttock inflammatory changes. No subcutaneous discrete fluid collection. Enlarged left gluteus beverly muscle likely related to infection. An   intramuscular abscess is not excluded. IR FLUORO GUIDED CVA DEVICE PLMT/REPLACE/REMOVAL   Final Result   Satisfactory insertion of a temporary hemodialysis catheter; the catheter is   ready for use. IR ULTRASOUND GUIDANCE VASCULAR ACCESS   Final Result   Satisfactory insertion of a temporary hemodialysis catheter; the catheter is   ready for use. Assessment/Plan:  Principal Problem:    Acute renal failure (ARF) (HCC)  Active Problems:    Non-traumatic rhabdomyolysis    Substance abuse (HCC)    Hyperkalemia  Resolved Problems:    * No resolved hospital problems. *      1. Acute kidney injury  -improved with HD; last treatment 6/29 has not need since. Cr last few days 3.9-->4.2-->4.0-->3.9  -urine output improved.  IVF to be stopped today to see if creatinine remains stable without them. -nephrology to reassess dialysis need on daily basis but no plans for today. - Holding off on decision for tunneled HD catheter for now. - Avoid nephrotoxins. 2. Nontraumatic vs traumatic rhabdomyolysis   -unable to be determined. He denies fall but clinically appears to be injury and therefore would be traumatic etiology but if cocaine use etiology then would be nontraumatic.  - CK today normal at 111. No further need to check.     3. Hyperkalemia with EKG changes   -resolved with HD      4. Left buttock erythema and induration    -no clinical signs of abscess per general surgery   -on antibiotic coverage Day #7 of Vanco and Zosyn completed yesterday 7/1.  -repeat CT for comparison per general surgery does not show evidence of abscess  - seen bu PT today and AMPAC score 21.    5. Left buttock/thigh pain  -decreased Fentanyl to q2 prn yesterday and added Percocet pain panel.   - Fentanyl discontinued today. 6. Substance abuse  -urine toxicology screen, patient is not very forthcoming about use history, states he hasn't used in \"a long time\" but admitted to taking hit off pipe but did not get buzz.  -urine toxicology positive for cocaine and fentanyl  -counseled about drug use     Case discussed with Dr. Brianna Enriquez of nephrology at bedside. NOTE: This report was transcribed using voice recognition software. Every effort was made to ensure accuracy; however, inadvertent computerized transcription errors may be present.      Electronically signed by Beatrice Kaur MD on 7/2/2020 at 10:46 AM

## 2020-07-02 NOTE — PLAN OF CARE
Problem: Pain:  Goal: Control of acute pain  Description: Control of acute pain  Outcome: Met This Shift     Problem: Falls - Risk of:  Goal: Will remain free from falls  Description: Will remain free from falls  7/2/2020 0220 by Garon Severe, RN  Outcome: Met This Shift     Problem: Falls - Risk of:  Goal: Absence of physical injury  Description: Absence of physical injury  Outcome: Met This Shift

## 2020-07-02 NOTE — TELEPHONE ENCOUNTER
Timothy Lopez (nurse) called stated that patient to be discharging today 7/2. Is at [facility] for acute renal failure. He is not an established patient but lives in [facility] therefore probably Amena Zamora or Janis Pichardo, I explained to nurse so that he can be seen in [facility] after initial NP visit in [place]. Please call Timothy Lopez back today [phone].

## 2020-07-03 VITALS
BODY MASS INDEX: 23.19 KG/M2 | TEMPERATURE: 98.2 F | WEIGHT: 153 LBS | RESPIRATION RATE: 16 BRPM | HEART RATE: 68 BPM | SYSTOLIC BLOOD PRESSURE: 126 MMHG | OXYGEN SATURATION: 98 % | DIASTOLIC BLOOD PRESSURE: 82 MMHG | HEIGHT: 68 IN

## 2020-07-03 LAB
ANION GAP SERPL CALCULATED.3IONS-SCNC: 12 MMOL/L (ref 7–16)
BASOPHILS ABSOLUTE: 0.08 E9/L (ref 0–0.2)
BASOPHILS RELATIVE PERCENT: 1.2 % (ref 0–2)
BUN BLDV-MCNC: 33 MG/DL (ref 6–20)
CALCIUM SERPL-MCNC: 8.9 MG/DL (ref 8.6–10.2)
CHLORIDE BLD-SCNC: 107 MMOL/L (ref 98–107)
CO2: 22 MMOL/L (ref 22–29)
CREAT SERPL-MCNC: 3.7 MG/DL (ref 0.7–1.2)
EOSINOPHILS ABSOLUTE: 0.31 E9/L (ref 0.05–0.5)
EOSINOPHILS RELATIVE PERCENT: 4.6 % (ref 0–6)
GFR AFRICAN AMERICAN: 21
GFR NON-AFRICAN AMERICAN: 18 ML/MIN/1.73
GLUCOSE BLD-MCNC: 86 MG/DL (ref 74–99)
HCT VFR BLD CALC: 34.3 % (ref 37–54)
HEMOGLOBIN: 11 G/DL (ref 12.5–16.5)
IMMATURE GRANULOCYTES #: 0.05 E9/L
IMMATURE GRANULOCYTES %: 0.7 % (ref 0–5)
LYMPHOCYTES ABSOLUTE: 1.13 E9/L (ref 1.5–4)
LYMPHOCYTES RELATIVE PERCENT: 16.9 % (ref 20–42)
MAGNESIUM: 1.8 MG/DL (ref 1.6–2.6)
MCH RBC QN AUTO: 33.4 PG (ref 26–35)
MCHC RBC AUTO-ENTMCNC: 32.1 % (ref 32–34.5)
MCV RBC AUTO: 104.3 FL (ref 80–99.9)
MONOCYTES ABSOLUTE: 0.61 E9/L (ref 0.1–0.95)
MONOCYTES RELATIVE PERCENT: 9.1 % (ref 2–12)
NEUTROPHILS ABSOLUTE: 4.52 E9/L (ref 1.8–7.3)
NEUTROPHILS RELATIVE PERCENT: 67.5 % (ref 43–80)
PDW BLD-RTO: 13.2 FL (ref 11.5–15)
PHOSPHORUS: 5.6 MG/DL (ref 2.5–4.5)
PLATELET # BLD: 222 E9/L (ref 130–450)
PMV BLD AUTO: 11 FL (ref 7–12)
POTASSIUM SERPL-SCNC: 4.2 MMOL/L (ref 3.5–5)
RBC # BLD: 3.29 E12/L (ref 3.8–5.8)
SODIUM BLD-SCNC: 141 MMOL/L (ref 132–146)
WBC # BLD: 6.7 E9/L (ref 4.5–11.5)

## 2020-07-03 PROCEDURE — 83735 ASSAY OF MAGNESIUM: CPT

## 2020-07-03 PROCEDURE — 80048 BASIC METABOLIC PNL TOTAL CA: CPT

## 2020-07-03 PROCEDURE — 36415 COLL VENOUS BLD VENIPUNCTURE: CPT

## 2020-07-03 PROCEDURE — 6370000000 HC RX 637 (ALT 250 FOR IP): Performed by: INTERNAL MEDICINE

## 2020-07-03 PROCEDURE — 2580000003 HC RX 258: Performed by: INTERNAL MEDICINE

## 2020-07-03 PROCEDURE — 84100 ASSAY OF PHOSPHORUS: CPT

## 2020-07-03 PROCEDURE — 99239 HOSP IP/OBS DSCHRG MGMT >30: CPT | Performed by: INTERNAL MEDICINE

## 2020-07-03 PROCEDURE — 85025 COMPLETE CBC W/AUTO DIFF WBC: CPT

## 2020-07-03 RX ORDER — NICOTINE 21 MG/24HR
1 PATCH, TRANSDERMAL 24 HOURS TRANSDERMAL DAILY
Qty: 30 PATCH | Refills: 0 | Status: SHIPPED | OUTPATIENT
Start: 2020-07-03 | End: 2020-07-15 | Stop reason: ALTCHOICE

## 2020-07-03 RX ORDER — OXYCODONE HYDROCHLORIDE AND ACETAMINOPHEN 5; 325 MG/1; MG/1
1 TABLET ORAL EVERY 6 HOURS PRN
Qty: 12 TABLET | Refills: 0 | Status: SHIPPED | OUTPATIENT
Start: 2020-07-03 | End: 2020-07-06

## 2020-07-03 RX ADMIN — Medication 10 ML: at 09:20

## 2020-07-03 RX ADMIN — OXYCODONE AND ACETAMINOPHEN 2 TABLET: 5; 325 TABLET ORAL at 04:31

## 2020-07-03 RX ADMIN — OXYCODONE AND ACETAMINOPHEN 2 TABLET: 5; 325 TABLET ORAL at 00:33

## 2020-07-03 RX ADMIN — OXYCODONE AND ACETAMINOPHEN 2 TABLET: 5; 325 TABLET ORAL at 09:19

## 2020-07-03 ASSESSMENT — PAIN DESCRIPTION - PAIN TYPE
TYPE: ACUTE PAIN

## 2020-07-03 ASSESSMENT — PAIN SCALES - GENERAL
PAINLEVEL_OUTOF10: 7

## 2020-07-03 ASSESSMENT — PAIN DESCRIPTION - DIRECTION: RADIATING_TOWARDS: DOWN

## 2020-07-03 ASSESSMENT — PAIN DESCRIPTION - FREQUENCY
FREQUENCY: CONTINUOUS

## 2020-07-03 ASSESSMENT — PAIN DESCRIPTION - PROGRESSION: CLINICAL_PROGRESSION: NOT CHANGED

## 2020-07-03 ASSESSMENT — PAIN DESCRIPTION - ORIENTATION
ORIENTATION: LEFT

## 2020-07-03 ASSESSMENT — PAIN DESCRIPTION - DESCRIPTORS
DESCRIPTORS: ACHING;CONSTANT;DISCOMFORT

## 2020-07-03 ASSESSMENT — PAIN DESCRIPTION - LOCATION
LOCATION: HIP

## 2020-07-03 ASSESSMENT — PAIN - FUNCTIONAL ASSESSMENT: PAIN_FUNCTIONAL_ASSESSMENT: ACTIVITIES ARE NOT PREVENTED

## 2020-07-03 ASSESSMENT — PAIN DESCRIPTION - ONSET: ONSET: ON-GOING

## 2020-07-03 NOTE — PLAN OF CARE
Problem: Pain:  Goal: Pain level will decrease  Description: Pain level will decrease  7/3/2020 0918 by Cydney Rossi RN  Outcome: Met This Shift     Problem: Pain:  Goal: Control of acute pain  Description: Control of acute pain  7/3/2020 0918 by Cydney Rossi RN  Outcome: Met This Shift     Problem: Pain:  Goal: Control of chronic pain  Description: Control of chronic pain  7/3/2020 0918 by Cydney Rossi RN  Outcome: Met This Shift     Problem: Falls - Risk of:  Goal: Will remain free from falls  Description: Will remain free from falls  7/3/2020 0918 by Cydney Rossi RN  Outcome: Met This Shift     Problem: Falls - Risk of:  Goal: Absence of physical injury  Description: Absence of physical injury  7/3/2020 0918 by Cydney Rossi RN  Outcome: Met This Shift

## 2020-07-03 NOTE — PLAN OF CARE
Problem: Pain:  Goal: Pain level will decrease  Description: Pain level will decrease  7/3/2020 0519 by Tavares Vergara RN  Outcome: Met This Shift     Problem: Pain:  Goal: Control of acute pain  Description: Control of acute pain  7/3/2020 0519 by Tavares Vergara RN  Outcome: Met This Shift     Problem: Pain:  Goal: Control of chronic pain  Description: Control of chronic pain  7/3/2020 0519 by Tavares Vergara RN  Outcome: Met This Shift     Problem: Falls - Risk of:  Goal: Will remain free from falls  Description: Will remain free from falls  7/3/2020 0519 by Tavares Vergara RN  Outcome: Met This Shift     Problem: Falls - Risk of:  Goal: Absence of physical injury  Description: Absence of physical injury  7/3/2020 0519 by Tavares Vergara RN  Outcome: Met This Shift

## 2020-07-03 NOTE — PLAN OF CARE
Problem: Pain:  Goal: Pain level will decrease  Description: Pain level will decrease  Outcome: Met This Shift     Problem: Pain:  Goal: Control of acute pain  Description: Control of acute pain  7/2/2020 2227 by Ricco Murphy RN  Outcome: Met This Shift     Problem: Pain:  Goal: Control of chronic pain  Description: Control of chronic pain  Outcome: Met This Shift     Problem: Falls - Risk of:  Goal: Will remain free from falls  Description: Will remain free from falls  7/2/2020 2227 by Ricco Murphy RN  Outcome: Met This Shift     Problem: Falls - Risk of:  Goal: Absence of physical injury  Description: Absence of physical injury  Outcome: Met This Shift

## 2020-07-03 NOTE — DISCHARGE SUMMARY
[facility] Physician Discharge Summary       [facility]  [address] [place]  Phone: [phone]    Go on 7/6/2020  Assessment Monday July 6, 12PM. Pt needs to bring Photo ID. Miguelangel Milton DO  [address] [facility] [address] [phone]    Go on 7/15/2020  New PCP appt w/ Dr. Milton, July 15th @ 10:00 am. Pt to arrive no sooner than 5 mins before appt. come alone,wear maske. If someone accompanies pt that person must be free of flu like symptoms &wear a mask. Bring photo ID, Ins Card, new meds. Lisha Rileynaimajimmy, [address] [place], [place] [phone]    Schedule an appointment as soon as possible for a visit in 3 weeks  Hospital follow up for renal failure. Activity level: As tolerated     Diet: DIET RENAL;    Labs:BMP, Mg and Phos weekly on Mondays x 4 occurrences. Condition at discharge: Stable     Dispo:Home    Special instructions: No NSAIDs or aspirin! Patient ID:  Ravinder Alva  [id number]  80 y.o.  1975    Admit date: 6/25/2020    Discharge date and time:  7/3/2020  9:40 AM    Admission Diagnoses: Principal Problem:    Acute renal failure (ARF) ([address]  Active Problems:    Non-traumatic rhabdomyolysis    Substance abuse ([address]    Hyperkalemia  Resolved Problems:    * No resolved hospital problems. *      Discharge Diagnoses: Principal Problem:    Acute renal failure (ARF) ([address]  Active Problems:    Non-traumatic rhabdomyolysis    Substance abuse (HCC)    Hyperkalemia  Resolved Problems:    * No resolved hospital problems. *      Consults:  IP CONSULT TO CRITICAL CARE  IP CONSULT TO NEPHROLOGY  IP CONSULT TO PHARMACY  IP CONSULT TO GENERAL SURGERY    Procedures: None    History of Present Illness:  54 y.o. male with a history of heroin abuse presents with left buttock pain.   Six days ago he experienced left leg numbness, right arm pain, which has subsequently resolved. Over the last day he has noticed left buttock pain. In the emergency department, labs notable for creatinine of 10.8, potassium 7.4 (moderately hemolyzed), CK 11,090.      Hospital Course: 55 yo male admitted as per above details. See outline below for additional details and issues addressed this admission:     1. Acute kidney injury  -improved with HD; last treatment 6/29 has not need since. Cr last few days 3.9-->4.2-->4.0-->3.9-->3.7  -urine output improved. IVF to be stopped yesterday to see if creatinine remains stable without them and it actually improved. -nephrology okay for discharge with close monitoring of BMP, Mg and phos weekly x 4.  - Holding off on  tunneled HD catheter for now. - Avoid nephrotoxins especially NSAIDs or aspirin.     2. Nontraumatic vs traumatic rhabdomyolysis   -unable to be determined. He denies fall but clinically appears to be injury and therefore would be traumatic etiology but if cocaine use etiology then would be nontraumatic.  - CK yesterday normal at 111. No further need to check.     3. Hyperkalemia with EKG changes   -resolved with HD      4. Left buttock erythema and induration    -no clinical signs of abscess per general surgery   -on antibiotic coverage Day #7 of Vanco and Zosyn completed yesterday 7/1.  -repeat CT for comparison per general surgery does not show evidence of abscess  - [facility]     5. Left buttock/thigh pain  -decreased Fentanyl to q2 prn yesterday and added Percocet pain panel.   - Fentanyl discontinued Wednesday. Will send home on low dose Percocet for 3 days. E-scribed Percocet 5/325 #12/NR.  Check OARRS report.     6. Substance abuse  -urine toxicology screen, patient is not very forthcoming about use history, states he hasn't used in \"a long time\" but admitted to taking hit off pipe but did not get buzz.  -urine toxicology positive for cocaine and fentanyl  -counseled about drug use and has appt with [facility] July 6th. 7. Tobacco abuse  - patient did well on nicotine patch while in house but feels he will smoke on discharge. He was agreeable to accept Rx for patch to start when he is ready to quit. He was counseled and is aware that he should not smoke if starts wearing nicotine patch. Discharge Exam:  Vitals:    07/02/20 1945 07/02/20 2334 07/03/20 0631 07/03/20 0900   BP: 134/80   126/82   Pulse: 71   68   Resp: 16   16   Temp: 99 °F (37.2 °C)   98.2 °F (36.8 °C)   TempSrc: Oral   Oral   SpO2: 100% 100%  98%   Weight:   153 lb (69.4 kg)    Height:         General Appearance: alert and in no acute distress  Skin: warm and dry, scratches on the back, no rash  Head: normocephalic and atraumatic  Eyes: pupils equal, round, and reactive to light, extraocular eye movements intact, conjunctivae normal  Neck: RIJ temporary dialysis catheter in place with clean dressing  Pulmonary/Chest: clear to auscultation bilaterally- no wheezes, rales or rhonchi, normal air movement, no respiratory distress  Cardiovascular: normal rate, normal S1 and S2 and no carotid bruits  Abdomen: soft, non-tender, non-distended, normal bowel sounds, no masses or organomegaly  Extremities: left buttock erythematous, indurated, tender  Neurologic: no cranial nerve deficit and speech normal     I/O last 3 completed shifts: In: 1160 [P.O.:600; I.V.:560]  Out: 300 [Urine:300]  No intake/output data recorded.       LABS:  Recent Labs     07/02/20  0637 07/02/20  1108 07/03/20  0627    140 141   K 4.3 4.3 4.2    106 107   CO2 22 23 22   BUN 33* 32* 33*   CREATININE 4.0* 3.9* 3.7*   GLUCOSE 86 99 86   CALCIUM 8.3* 8.4* 8.9       Recent Labs     07/01/20  0717 07/02/20  0637 07/03/20  0627   WBC 8.4 7.6 6.7   RBC 3.70* 3.17* 3.29*   HGB 12.3* 11.0* 11.0*   HCT 38.1 32.7* 34.3*   .0* 103.2* 104.3*   MCH 33.2 34.7 33.4   MCHC 32.3 33.6 32.1   RDW 13.1 13.0 13.2    206 222   MPV 10.5 10.4 11.0         enlarged. Degenerative changes within the lower lumbar spine. Hip joint spaces are grossly preserved. Limited evaluation without intravenous contrast. Left buttock inflammatory changes. No subcutaneous discrete fluid collection. Enlarged left gluteus beverly muscle likely related to infection. An intramuscular abscess is not excluded. Ir Starallen Alamin Device Plmt/replace/removal    Result Date: 6/25/2020  PROCEDURE: IR FLUOROSCOPY GUIDED CENTRAL VENOUS ACCESS DEVICE PLACEMENT; US GUIDED VASCULAR ACCESS MODERATE CONSCIOUS SEDATION 6/25/2020 HISTORY: ORDERING SYSTEM PROVIDED HISTORY: temporary dialysis catheter placement TECHNOLOGIST PROVIDED HISTORY: Reason for exam:->temporary dialysis catheter placement renal failure with elevated creatinine and potassium. History of drug use. TECHNIQUE: Ultrasound guided insertion of a temporary hemodialysis catheter via the right internal jugular vein. CONTRAST: No iodinated contrast was utilized during the study. SEDATION: No intravenous sedation. FLUOROSCOPY DOSE AND TYPE OR TIME AND EXPOSURES: 0.4 minutes of fluoroscopy time, DAP 4558 mGy cm squared. Air Kerma 12 mGy. DESCRIPTION OF PROCEDURE: I discussed the alternatives, benefits and risks of the procedure with Mr. Napolean Kehr; all of his questions were answered and he consents. We discussed the nature of kidney function, renal failure, and the need for hemodialysis currently. The patient was placed supine on the angiography table and patient identification time out was performed. The right side of the neck was evaluated, the internal jugular vein is widely patent and images were obtained; an appropriate area of the skin was marked, the skin was prepared and draped with sterile technique. Maximum sterile barrier technique was utilized. The skin and subcutaneous tissues were anesthetized with 1% lidocaine using a 25 gauge needle. A 3 mm skin nick was made.   Using ultrasound guidance, a 21 gauge needle was exam:->temporary dialysis catheter placement renal failure with elevated creatinine and potassium. History of drug use. TECHNIQUE: Ultrasound guided insertion of a temporary hemodialysis catheter via the right internal jugular vein. CONTRAST: No iodinated contrast was utilized during the study. SEDATION: No intravenous sedation. FLUOROSCOPY DOSE AND TYPE OR TIME AND EXPOSURES: 0.4 minutes of fluoroscopy time, DAP 4558 mGy cm squared. Air Kerma 12 mGy. DESCRIPTION OF PROCEDURE: I discussed the alternatives, benefits and risks of the procedure with . Anna Serrano; all of his questions were answered and he consents. We discussed the nature of kidney function, renal failure, and the need for hemodialysis currently. The patient was placed supine on the angiography table and patient identification time out was performed. The right side of the neck was evaluated, the internal jugular vein is widely patent and images were obtained; an appropriate area of the skin was marked, the skin was prepared and draped with sterile technique. Maximum sterile barrier technique was utilized. The skin and subcutaneous tissues were anesthetized with 1% lidocaine using a 25 gauge needle. A 3 mm skin nick was made. Using ultrasound guidance, a 21 gauge needle was placed into the right internal jugular vein and there was blood return. A 0.018 inch guidewire was inserted through the needle, fluoroscopy confirms satisfactory position into the superior vena cava. The needle was exchanged for a micropuncture kit. The wire was advanced to the SVC/RA junction and the appropriate length for a temporary hemodialysis catheter was measured at 20 cm. The catheter was exchanged over a 0.035 inch Bentson guidewire, the tract was dilated and a 14 [place] by 20 cm dual lumen hemodialysis catheter was inserted until its tip was in the central superior vena cava, near the junction with the right atrium.   There was low resistance aspiration and injection of fluid, satisfactory flow for hemodialysis. Each lumen was flushed with 10 mL of heparinized saline then instilled with heparin 1000 units/mL: 1.5 mL into the \"arterial\" red lumen and 1.6 mL into the blue \"venous\" lumen. The catheter was sutured into place using 2 stitches of 2-0 Ethilon. There are no signs of swelling or bleeding at the puncture site. A dry sterile dressing was applied. Follow-up radiograph demonstrates satisfactory positioning of the catheter with the tip overlying the SVC/RA junction. No tortuosity or kinking detected. Mr. Lorna Chauhan tolerated the procedure well and was transferred in satisfactory condition. FINDINGS: Widely patent right internal jugular vein. Follow-up fluoroscopic spot image demonstrates satisfactory positioning of the 14 French by 20 cm temporary hemodialysis catheter. Satisfactory insertion of a temporary hemodialysis catheter; the catheter is ready for use. Patient Instructions:   Current Discharge Medication List      START taking these medications    Details   oxyCODONE-acetaminophen (PERCOCET) 5-325 MG per tablet Take 1 tablet by mouth every 6 hours as needed for Pain for up to 3 days. Qty: 12 tablet, Refills: 0    Comments: Reduce doses taken as pain becomes manageable  Associated Diagnoses: Contusion of multiple sites of buttock, initial encounter      nicotine (NICODERM CQ) 21 MG/24HR Place 1 patch onto the skin daily  Qty: 30 patch, Refills: 0               Note that more than 30 minutes was spent in preparing discharge papers, discussing discharge with patient, medication review, etc.    NOTE: This report was transcribed using voice recognition software.  Every effort was made to ensure accuracy; however, inadvertent computerized transcription errors may be present.     Signed:  Electronically signed by Jaja Avilez MD on 7/3/2020 at 9:40 AM

## 2020-07-07 ENCOUNTER — HOSPITAL ENCOUNTER (OUTPATIENT)
Age: 45
Discharge: HOME | End: 2020-07-07
Payer: MEDICAID

## 2020-07-07 LAB
ANION GAP SERPL CALCULATED.3IONS-SCNC: 14 MMOL/L (ref 7–16)
BUN BLDV-MCNC: 32 MG/DL (ref 6–20)
CALCIUM SERPL-MCNC: 9.7 MG/DL (ref 8.6–10.2)
CHLORIDE BLD-SCNC: 103 MMOL/L (ref 98–107)
CO2: 24 MMOL/L (ref 22–29)
CREAT SERPL-MCNC: 2.1 MG/DL (ref 0.7–1.2)
GFR AFRICAN AMERICAN: 41
GFR NON-AFRICAN AMERICAN: 34 ML/MIN/1.73
GLUCOSE BLD-MCNC: 94 MG/DL (ref 74–99)
MAGNESIUM: 1.7 MG/DL (ref 1.6–2.6)
PHOSPHORUS: 3.6 MG/DL (ref 2.5–4.5)
POTASSIUM SERPL-SCNC: 4.9 MMOL/L (ref 3.5–5)
SODIUM BLD-SCNC: 141 MMOL/L (ref 132–146)

## 2020-07-07 PROCEDURE — 83735 ASSAY OF MAGNESIUM: CPT

## 2020-07-07 PROCEDURE — 80048 BASIC METABOLIC PNL TOTAL CA: CPT

## 2020-07-07 PROCEDURE — 36415 COLL VENOUS BLD VENIPUNCTURE: CPT

## 2020-07-07 PROCEDURE — 84100 ASSAY OF PHOSPHORUS: CPT

## 2020-07-15 ENCOUNTER — OFFICE VISIT (OUTPATIENT)
Dept: FAMILY MEDICINE CLINIC | Age: 45
End: 2020-07-15
Payer: MEDICAID

## 2020-07-15 ENCOUNTER — HOSPITAL ENCOUNTER (OUTPATIENT)
Age: 45
Discharge: HOME | End: 2020-07-17
Payer: MEDICAID

## 2020-07-15 VITALS
TEMPERATURE: 99.5 F | HEIGHT: 68 IN | DIASTOLIC BLOOD PRESSURE: 66 MMHG | RESPIRATION RATE: 18 BRPM | BODY MASS INDEX: 22.58 KG/M2 | WEIGHT: 149 LBS | OXYGEN SATURATION: 97 % | HEART RATE: 96 BPM | SYSTOLIC BLOOD PRESSURE: 108 MMHG

## 2020-07-15 LAB
ANION GAP SERPL CALCULATED.3IONS-SCNC: 15 MMOL/L (ref 7–16)
BASOPHILS ABSOLUTE: 0.09 E9/L (ref 0–0.2)
BASOPHILS RELATIVE PERCENT: 1.6 % (ref 0–2)
BUN BLDV-MCNC: 24 MG/DL (ref 6–20)
CALCIUM SERPL-MCNC: 9.6 MG/DL (ref 8.6–10.2)
CHLORIDE BLD-SCNC: 103 MMOL/L (ref 98–107)
CHOLESTEROL, TOTAL: 202 MG/DL (ref 0–199)
CO2: 24 MMOL/L (ref 22–29)
CREAT SERPL-MCNC: 1.3 MG/DL (ref 0.7–1.2)
EOSINOPHILS ABSOLUTE: 0.13 E9/L (ref 0.05–0.5)
EOSINOPHILS RELATIVE PERCENT: 2.3 % (ref 0–6)
GFR AFRICAN AMERICAN: >60
GFR NON-AFRICAN AMERICAN: 59 ML/MIN/1.73
GLUCOSE BLD-MCNC: 88 MG/DL (ref 74–99)
HBA1C MFR BLD: 5.3 % (ref 4–5.6)
HCT VFR BLD CALC: 39.9 % (ref 37–54)
HDLC SERPL-MCNC: 46 MG/DL
HEMOGLOBIN: 12.9 G/DL (ref 12.5–16.5)
IMMATURE GRANULOCYTES #: 0.03 E9/L
IMMATURE GRANULOCYTES %: 0.5 % (ref 0–5)
LDL CHOLESTEROL CALCULATED: 121 MG/DL (ref 0–99)
LYMPHOCYTES ABSOLUTE: 1.18 E9/L (ref 1.5–4)
LYMPHOCYTES RELATIVE PERCENT: 21.3 % (ref 20–42)
MCH RBC QN AUTO: 33.8 PG (ref 26–35)
MCHC RBC AUTO-ENTMCNC: 32.3 % (ref 32–34.5)
MCV RBC AUTO: 104.5 FL (ref 80–99.9)
MONOCYTES ABSOLUTE: 0.45 E9/L (ref 0.1–0.95)
MONOCYTES RELATIVE PERCENT: 8.1 % (ref 2–12)
NEUTROPHILS ABSOLUTE: 3.67 E9/L (ref 1.8–7.3)
NEUTROPHILS RELATIVE PERCENT: 66.2 % (ref 43–80)
PDW BLD-RTO: 13.1 FL (ref 11.5–15)
PLATELET # BLD: 170 E9/L (ref 130–450)
PMV BLD AUTO: 11.8 FL (ref 7–12)
POTASSIUM SERPL-SCNC: 4.8 MMOL/L (ref 3.5–5)
RBC # BLD: 3.82 E12/L (ref 3.8–5.8)
SODIUM BLD-SCNC: 142 MMOL/L (ref 132–146)
TRIGL SERPL-MCNC: 175 MG/DL (ref 0–149)
TSH SERPL DL<=0.05 MIU/L-ACNC: 1.41 UIU/ML (ref 0.27–4.2)
URIC ACID, SERUM: 3.9 MG/DL (ref 3.4–7)
VLDLC SERPL CALC-MCNC: 35 MG/DL
WBC # BLD: 5.6 E9/L (ref 4.5–11.5)

## 2020-07-15 PROCEDURE — 1111F DSCHRG MED/CURRENT MED MERGE: CPT | Performed by: FAMILY MEDICINE

## 2020-07-15 PROCEDURE — 84550 ASSAY OF BLOOD/URIC ACID: CPT

## 2020-07-15 PROCEDURE — 80061 LIPID PANEL: CPT

## 2020-07-15 PROCEDURE — 84443 ASSAY THYROID STIM HORMONE: CPT

## 2020-07-15 PROCEDURE — 80048 BASIC METABOLIC PNL TOTAL CA: CPT

## 2020-07-15 PROCEDURE — 83036 HEMOGLOBIN GLYCOSYLATED A1C: CPT

## 2020-07-15 PROCEDURE — 99214 OFFICE O/P EST MOD 30 MIN: CPT | Performed by: FAMILY MEDICINE

## 2020-07-15 PROCEDURE — 80074 ACUTE HEPATITIS PANEL: CPT

## 2020-07-15 PROCEDURE — 85025 COMPLETE CBC W/AUTO DIFF WBC: CPT

## 2020-07-15 RX ORDER — DOXYCYCLINE HYCLATE 100 MG
100 TABLET ORAL 2 TIMES DAILY
Qty: 20 TABLET | Refills: 0 | Status: SHIPPED | OUTPATIENT
Start: 2020-07-15 | End: 2020-07-25

## 2020-07-15 SDOH — HEALTH STABILITY: MENTAL HEALTH: HOW OFTEN DO YOU HAVE A DRINK CONTAINING ALCOHOL?: 2-3 TIMES A WEEK

## 2020-07-15 ASSESSMENT — PATIENT HEALTH QUESTIONNAIRE - PHQ9
SUM OF ALL RESPONSES TO PHQ9 QUESTIONS 1 & 2: 2
1. LITTLE INTEREST OR PLEASURE IN DOING THINGS: 0
2. FEELING DOWN, DEPRESSED OR HOPELESS: 2
SUM OF ALL RESPONSES TO PHQ QUESTIONS 1-9: 2
SUM OF ALL RESPONSES TO PHQ QUESTIONS 1-9: 2

## 2020-07-15 NOTE — PROGRESS NOTES
left leg pain. History of Present illness - Follow up of Hospital diagnosis(es): acute renal failure, substance abuse    Inpatient course: Discharge summary reviewed- see chart. Interval history/Current status: good, he still has left hip pain    A comprehensive review of systems was negative except for what was noted in the HPI. Vitals:    07/15/20 0955   BP: 108/66   Site: Left Upper Arm   Position: Sitting   Cuff Size: Large Adult   Pulse: 96   Resp: 18   Temp: 99.5 °F (37.5 °C)   TempSrc: Temporal   SpO2: 97%   Weight: 149 lb (67.6 kg)   Height: 5' 8\" (1.727 m)     Body mass index is 22.66 kg/m².    Wt Readings from Last 3 Encounters:   07/15/20 149 lb (67.6 kg)   07/03/20 153 lb (69.4 kg)   04/09/20 133 lb (60.3 kg)     BP Readings from Last 3 Encounters:   07/15/20 108/66   07/03/20 126/82   05/28/20 130/85        Physical Exam:  General Appearance: alert and oriented to person, place and time, well-developed and well-nourished, in no acute distress  Skin: warm and dry, no rash or erythema  Head: normocephalic and atraumatic  Eyes: pupils equal, round, and reactive to light, extraocular eye movements intact, conjunctivae normal  ENT: tympanic membrane, external ear and ear canal normal bilaterally, oropharynx clear and moist with normal mucous membranes and hearing grossly normal bilaterally  Neck: neck supple and non tender without mass, no thyromegaly or thyroid nodules, no cervical lymphadenopathy   Pulmonary/Chest: clear to auscultation bilaterally- no wheezes, rales or rhonchi, normal air movement, no respiratory distress and no chest wall tenderness  Cardiovascular: normal rate, regular rhythm, normal S1 and S2, no murmurs, no gallops, intact distal pulses, no carotid bruits and no JVD  Abdomen: soft, non-tender, non-distended, normal bowel sounds, no masses or organomegaly  Genitourinary: genitals normal without hernia or inguinal adenopathy  Extremities: no cyanosis and no clubbing  Musculoskeletal: C/O still having left hip pain  Neurologic: gait and coordination normal and speech normal    Assessment/Plan:  1. Abscess  --I ask him to see infectious disease. He refused this advice---AMA  - Basic Metabolic Panel; Future  - CBC Auto Differential; Future  - CT PELVIS WO CONTRAST Additional Contrast? None; Future  - doxycycline hyclate (VIBRA-TABS) 100 MG tablet; Take 1 tablet by mouth 2 times daily for 10 days  Dispense: 20 tablet; Refill: 0    2. Acute renal failure, unspecified acute renal failure type [facility])  --I ask him to see nephrology. He refused this advice--AMA  - Basic Metabolic Panel; Future  - CBC Auto Differential; Future    3. Substance abuse ([facility]    - Basic Metabolic Panel; Future  - CBC Auto Differential; Future    4. Fatigue, unspecified type    - TSH without Reflex; Future  - Uric Acid; Future  - Basic Metabolic Panel; Future  - CBC Auto Differential; Future    5. Dyslipidemia    - Basic Metabolic Panel; Future  - Lipid Panel; Future  - CBC Auto Differential; Future    6. IFG (impaired fasting glucose)    - Basic Metabolic Panel; Future  - CBC Auto Differential; Future  - Hemoglobin A1C; Future    7. Elevated LFTs    - Hepatitis Panel, Acute;  Future         Medical Decision Making: moderate complexity

## 2020-07-16 LAB
HAV IGM SER IA-ACNC: NORMAL
HEPATITIS B CORE IGM ANTIBODY: NORMAL
HEPATITIS B SURFACE ANTIGEN INTERPRETATION: NORMAL
HEPATITIS C ANTIBODY INTERPRETATION: NORMAL

## 2020-07-24 ENCOUNTER — HOSPITAL ENCOUNTER (OUTPATIENT)
Dept: CT IMAGING | Age: 45
Discharge: HOME | End: 2020-07-24
Payer: MEDICAID

## 2020-07-24 PROCEDURE — 72192 CT PELVIS W/O DYE: CPT

## 2020-08-03 ENCOUNTER — HOSPITAL ENCOUNTER (OUTPATIENT)
Age: 45
Discharge: HOME | End: 2020-08-05
Payer: MEDICAID

## 2020-08-03 ENCOUNTER — HOSPITAL ENCOUNTER (OUTPATIENT)
Dept: GENERAL RADIOLOGY | Age: 45
Discharge: HOME | End: 2020-08-05
Payer: MEDICAID

## 2020-08-03 ENCOUNTER — OFFICE VISIT (OUTPATIENT)
Dept: FAMILY MEDICINE CLINIC | Age: 45
End: 2020-08-03
Payer: MEDICAID

## 2020-08-03 VITALS
RESPIRATION RATE: 18 BRPM | DIASTOLIC BLOOD PRESSURE: 68 MMHG | WEIGHT: 146 LBS | SYSTOLIC BLOOD PRESSURE: 124 MMHG | HEIGHT: 68 IN | TEMPERATURE: 97.8 F | HEART RATE: 93 BPM | OXYGEN SATURATION: 98 % | BODY MASS INDEX: 22.13 KG/M2

## 2020-08-03 LAB
ALBUMIN SERPL-MCNC: 4.6 G/DL (ref 3.5–5.2)
ALP BLD-CCNC: 89 U/L (ref 40–129)
ALT SERPL-CCNC: 13 U/L (ref 0–40)
ANION GAP SERPL CALCULATED.3IONS-SCNC: 15 MMOL/L (ref 7–16)
AST SERPL-CCNC: 21 U/L (ref 0–39)
BASOPHILS ABSOLUTE: 0.05 E9/L (ref 0–0.2)
BASOPHILS RELATIVE PERCENT: 0.8 % (ref 0–2)
BILIRUB SERPL-MCNC: 0.2 MG/DL (ref 0–1.2)
BUN BLDV-MCNC: 13 MG/DL (ref 6–20)
CALCIUM SERPL-MCNC: 9.8 MG/DL (ref 8.6–10.2)
CHLORIDE BLD-SCNC: 103 MMOL/L (ref 98–107)
CO2: 25 MMOL/L (ref 22–29)
CREAT SERPL-MCNC: 1 MG/DL (ref 0.7–1.2)
EOSINOPHILS ABSOLUTE: 0.12 E9/L (ref 0.05–0.5)
EOSINOPHILS RELATIVE PERCENT: 2 % (ref 0–6)
GFR AFRICAN AMERICAN: >60
GFR NON-AFRICAN AMERICAN: >60 ML/MIN/1.73
GLUCOSE BLD-MCNC: 96 MG/DL (ref 74–99)
HCT VFR BLD CALC: 37.4 % (ref 37–54)
HEMOGLOBIN: 12.6 G/DL (ref 12.5–16.5)
IMMATURE GRANULOCYTES #: 0.01 E9/L
IMMATURE GRANULOCYTES %: 0.2 % (ref 0–5)
LYMPHOCYTES ABSOLUTE: 1.27 E9/L (ref 1.5–4)
LYMPHOCYTES RELATIVE PERCENT: 21.2 % (ref 20–42)
MCH RBC QN AUTO: 34 PG (ref 26–35)
MCHC RBC AUTO-ENTMCNC: 33.7 % (ref 32–34.5)
MCV RBC AUTO: 100.8 FL (ref 80–99.9)
MONOCYTES ABSOLUTE: 0.46 E9/L (ref 0.1–0.95)
MONOCYTES RELATIVE PERCENT: 7.7 % (ref 2–12)
NEUTROPHILS ABSOLUTE: 4.08 E9/L (ref 1.8–7.3)
NEUTROPHILS RELATIVE PERCENT: 68.1 % (ref 43–80)
PDW BLD-RTO: 12.7 FL (ref 11.5–15)
PLATELET # BLD: 147 E9/L (ref 130–450)
PMV BLD AUTO: 11.7 FL (ref 7–12)
POTASSIUM SERPL-SCNC: 4.6 MMOL/L (ref 3.5–5)
RBC # BLD: 3.71 E12/L (ref 3.8–5.8)
SODIUM BLD-SCNC: 143 MMOL/L (ref 132–146)
TOTAL PROTEIN: 7.4 G/DL (ref 6.4–8.3)
WBC # BLD: 6 E9/L (ref 4.5–11.5)

## 2020-08-03 PROCEDURE — 4004F PT TOBACCO SCREEN RCVD TLK: CPT | Performed by: FAMILY MEDICINE

## 2020-08-03 PROCEDURE — 73630 X-RAY EXAM OF FOOT: CPT

## 2020-08-03 PROCEDURE — G8420 CALC BMI NORM PARAMETERS: HCPCS | Performed by: FAMILY MEDICINE

## 2020-08-03 PROCEDURE — 99214 OFFICE O/P EST MOD 30 MIN: CPT | Performed by: FAMILY MEDICINE

## 2020-08-03 PROCEDURE — G8427 DOCREV CUR MEDS BY ELIG CLIN: HCPCS | Performed by: FAMILY MEDICINE

## 2020-08-03 PROCEDURE — 80053 COMPREHEN METABOLIC PANEL: CPT

## 2020-08-03 PROCEDURE — 85025 COMPLETE CBC W/AUTO DIFF WBC: CPT

## 2020-08-03 RX ORDER — DULOXETIN HYDROCHLORIDE 30 MG/1
30 CAPSULE, DELAYED RELEASE ORAL DAILY
Qty: 90 CAPSULE | Refills: 1 | Status: SHIPPED
Start: 2020-08-03 | End: 2020-11-27

## 2020-08-03 ASSESSMENT — ENCOUNTER SYMPTOMS
EYE ITCHING: 0
CHOKING: 0
SHORTNESS OF BREATH: 0
BLOOD IN STOOL: 0
ORTHOPNEA: 0
BLURRED VISION: 0
TROUBLE SWALLOWING: 0
SORE THROAT: 0
BACK PAIN: 1
CONSTIPATION: 0
CHEST TIGHTNESS: 0
EYE DISCHARGE: 0
COUGH: 0
EYE PAIN: 0
SINUS PRESSURE: 0
STRIDOR: 0
GASTROINTESTINAL NEGATIVE: 1
WHEEZING: 0
NAUSEA: 0
FACIAL SWELLING: 0
ALLERGIC/IMMUNOLOGIC NEGATIVE: 1
SINUS PAIN: 0
ABDOMINAL DISTENTION: 0
RHINORRHEA: 0
RESPIRATORY NEGATIVE: 1
DIARRHEA: 0
APNEA: 0
COLOR CHANGE: 0
ABDOMINAL PAIN: 0
ANAL BLEEDING: 0
EYE REDNESS: 0
VOMITING: 0
VOICE CHANGE: 0
PHOTOPHOBIA: 0
RECTAL PAIN: 0

## 2020-08-03 NOTE — PROGRESS NOTES
Carlos Cuevas is a 54 y.o. male. HPI/Chief C/O:  Chief Complaint   Patient presents with    Foot Pain     Left foot pain, not improved. pain started over 1 month ago. No Known Allergies  The patient is here for a medication list and treatment planning review  We will go over our care planning goals as well as take care of all refills  We will set up labs as well   C/O left foot, left hip and lower back pain    Hypertension   Associated symptoms include anxiety and malaise/fatigue. Pertinent negatives include no blurred vision, chest pain, headaches, neck pain, orthopnea, palpitations, peripheral edema, PND, shortness of breath or sweats. Risk factors for coronary artery disease include male gender, smoking/tobacco exposure and stress. Past treatments include lifestyle changes. The current treatment provides significant improvement. Compliance problems include diet, exercise and psychosocial issues. Hypertensive end-organ damage includes kidney disease (acute kidney injury ). There is no history of angina, CAD/MI, CVA, heart failure, left ventricular hypertrophy, PVD or retinopathy. There is no history of chronic renal disease, coarctation of the aorta, hyperaldosteronism, hypercortisolism, hyperparathyroidism, a hypertension causing med, pheochromocytoma, renovascular disease, sleep apnea or a thyroid problem. ROS:  Review of Systems   Constitutional: Positive for malaise/fatigue. Negative for activity change, appetite change, chills, diaphoresis, fatigue and unexpected weight change. HENT: Negative. Negative for congestion, dental problem, drooling, ear discharge, ear pain, facial swelling, hearing loss, mouth sores, nosebleeds, postnasal drip, rhinorrhea, sinus pressure, sinus pain, sneezing, sore throat, tinnitus, trouble swallowing and voice change. Eyes: Negative for blurred vision, photophobia, pain, discharge, redness, itching and visual disturbance.    Respiratory: Negative. Negative for apnea, cough, choking, chest tightness, shortness of breath, wheezing and stridor. Cardiovascular: Negative. Negative for chest pain, palpitations, orthopnea, leg swelling and PND. Gastrointestinal: Negative. Negative for abdominal distention, abdominal pain, anal bleeding, blood in stool, constipation, diarrhea, nausea, rectal pain and vomiting. Endocrine: Negative. Negative for cold intolerance, heat intolerance, polydipsia, polyphagia and polyuria. Genitourinary: Negative. Negative for decreased urine volume, difficulty urinating, discharge, dysuria, enuresis, flank pain, frequency, genital sores, hematuria, penile pain, penile swelling, scrotal swelling, testicular pain and urgency. Musculoskeletal: Positive for arthralgias (left foot pain) and back pain. Negative for gait problem, joint swelling, myalgias, neck pain and neck stiffness. Skin: Negative. Negative for color change, pallor, rash and wound. Allergic/Immunologic: Negative. Negative for environmental allergies, food allergies and immunocompromised state. Neurological: Negative for dizziness, tremors, seizures, syncope, facial asymmetry, speech difficulty, weakness, light-headedness and headaches. Hematological: Negative. Negative for adenopathy. Does not bruise/bleed easily. Past Medical/Surgical Hx;  Reviewed with patient      Diagnosis Date    Acute renal failure ([facility]      History reviewed. No pertinent surgical history. Past Family Hx:  Reviewed with patient      Problem Relation Age of Onset    Diabetes Mother     No Known Problems Father        Social Hx:  Reviewed with patient  Social History     Tobacco Use    Smoking status: Current Every Day Smoker     Packs/day: 1.00     Years: 25.00     Pack years: 25.00     Types: Cigarettes    Smokeless tobacco: Never Used   Substance Use Topics    Alcohol use:  Yes     Alcohol/week: 6.0 standard drinks     Types: 6 Cans of beer per week Frequency: 2-3 times a week       OBJECTIVE  /68 (Site: Left Upper Arm, Position: Sitting, Cuff Size: Large Adult)   Pulse 93   Temp 97.8 °F (36.6 °C) (Temporal)   Resp 18   Ht 5' 8\" (1.727 m)   Wt 146 lb (66.2 kg)   SpO2 98%   BMI 22.20 kg/m²     Problem List:  Yuly Kulkarni does not have any pertinent problems on file. PHYS EX:  Physical Exam  Vitals signs and nursing note reviewed. Constitutional:       General: He is not in acute distress. Appearance: Normal appearance. He is well-developed. He is not ill-appearing, toxic-appearing or diaphoretic. HENT:      Head: Normocephalic and atraumatic. Right Ear: Tympanic membrane, ear canal and external ear normal. There is no impacted cerumen. Left Ear: Tympanic membrane, ear canal and external ear normal. There is no impacted cerumen. Nose: Nose normal. No congestion or rhinorrhea. Mouth/Throat:      Mouth: Mucous membranes are moist.      Pharynx: Oropharynx is clear. No oropharyngeal exudate or posterior oropharyngeal erythema. Eyes:      General: No scleral icterus. Right eye: No discharge. Left eye: No discharge. Extraocular Movements: Extraocular movements intact. Conjunctiva/sclera: Conjunctivae normal.      Pupils: Pupils are equal, round, and reactive to light. Neck:      Musculoskeletal: Normal range of motion and neck supple. No neck rigidity or muscular tenderness. Thyroid: No thyromegaly. Vascular: No carotid bruit. Trachea: No tracheal deviation. Cardiovascular:      Rate and Rhythm: Normal rate and regular rhythm. Pulses: Normal pulses. Heart sounds: Normal heart sounds. No murmur. No friction rub. No gallop. Pulmonary:      Effort: Pulmonary effort is normal. No respiratory distress. Breath sounds: Normal breath sounds. No stridor. No wheezing, rhonchi or rales. Chest:      Chest wall: No tenderness.    Abdominal:      General: Bowel sounds are normal. There is no distension. Palpations: Abdomen is soft. There is no mass. Tenderness: There is no abdominal tenderness. There is no right CVA tenderness, left CVA tenderness, guarding or rebound. Hernia: No hernia is present. Genitourinary:     Penis: Normal. No tenderness. Scrotum/Testes: Normal.      Prostate: Normal.      Rectum: Normal.   Musculoskeletal: Normal range of motion. General: Tenderness (pain to his left foot) present. No swelling, deformity or signs of injury. Right lower leg: No edema. Left lower leg: No edema. Comments: Dorsalis pedis is good   Back and left hip still hurt but CT scan and physical exam are good    Lymphadenopathy:      Cervical: No cervical adenopathy. Skin:     General: Skin is warm. Coloration: Skin is not jaundiced or pale. Findings: No bruising, erythema, lesion or rash. Neurological:      General: No focal deficit present. Mental Status: He is alert and oriented to person, place, and time. Cranial Nerves: No cranial nerve deficit. Sensory: Sensory deficit (to his left foot) present. Motor: No weakness or abnormal muscle tone. Coordination: Coordination normal.      Gait: Gait normal.      Deep Tendon Reflexes: Reflexes are normal and symmetric. Reflexes normal.         ASSESSMENT/PLAN  Abhijit Ramos was seen today for foot pain. Diagnoses and all orders for this visit:    Stage 3 chronic kidney disease ([facility] [address]  -     Comprehensive Metabolic Panel; Future  -     CBC Auto Differential; Future  --stable on current care planning  -- continue treatment as we are meeting goals   And improving    Left foot pain  -     Comprehensive Metabolic Panel; Future  -     CBC Auto Differential; Future  -     DULoxetine (CYMBALTA) 30 MG extended release capsule; Take 1 capsule by mouth daily  -     GEOVANY - Kady Kim DPM, Podiatry, [place]  -     XR FOOT LEFT (2 VIEWS);  Future  --I will add cymbalta Neuropathy  -     Comprehensive Metabolic Panel; Future  -     CBC Auto Differential; Future  -     DULoxetine (CYMBALTA) 30 MG extended release capsule; Take 1 capsule by mouth daily  -     GEOVANY - Kady Kim DPM, Podiatry, [place]    IFG (impaired fasting glucose)  -     Comprehensive Metabolic Panel; Future  -     CBC Auto Differential; Future    Substance abuse (HCC)  -     Comprehensive Metabolic Panel; Future  -     CBC Auto Differential; Future        Outpatient Encounter Medications as of 8/3/2020   Medication Sig Dispense Refill    DULoxetine (CYMBALTA) 30 MG extended release capsule Take 1 capsule by mouth daily 90 capsule 1     No facility-administered encounter medications on file as of 8/3/2020. No follow-ups on file.         Reviewed recent labs related to Pratik's current problems      Discussed importance of regular Health Maintenance follow up  Health Maintenance   Topic    Pneumococcal 0-64 years Vaccine (1 of 1 - PPSV23)    HIV screen     Flu vaccine (1)    Lipid screen     DTaP/Tdap/Td vaccine (3 - Td)    Hepatitis A vaccine     Hepatitis B vaccine     Hib vaccine     Meningococcal (ACWY) vaccine

## 2020-08-03 NOTE — PATIENT INSTRUCTIONS
Patient Education        Foot Pain: Care Instructions  Your Care Instructions  Foot injuries that cause pain and swelling are fairly common. Almost all sports or home repair projects can cause a misstep that ends up as foot pain. Normal wear and tear, especially as you get older, also can cause foot pain. Most minor foot injuries will heal on their own, and home treatment is usually all you need to do. If you have a severe injury, you may need tests and treatment. Follow-up care is a key part of your treatment and safety. Be sure to make and go to all appointments, and call your doctor if you are having problems. It's also a good idea to know your test results and keep a list of the medicines you take. How can you care for yourself at home? · Take pain medicines exactly as directed. ? If the doctor gave you a prescription medicine for pain, take it as prescribed. ? If you are not taking a prescription pain medicine, ask your doctor if you can take an over-the-counter medicine. · Rest and protect your foot. Take a break from any activity that may cause pain. · Put ice or a cold pack on your foot for 10 to 20 minutes at a time. Put a thin cloth between the ice and your skin. · Prop up the sore foot on a pillow when you ice it or anytime you sit or lie down during the next 3 days. Try to keep it above the level of your heart. This will help reduce swelling. · Your doctor may recommend that you wrap your foot with an elastic bandage. Keep your foot wrapped for as long as your doctor advises. · If your doctor recommends crutches, use them as directed. · Wear roomy footwear. · As soon as pain and swelling end, begin gentle exercises of your foot. Your doctor can tell you which exercises will help. When should you call for help? [id number] anytime you think you may need emergency care. For example, call if:  · Your foot turns pale, white, blue, or cold.   Call your doctor now or seek immediate medical care if:  · You cannot move or stand on your foot. · Your foot looks twisted or out of its normal position. · Your foot is not stable when you step down. · You have signs of infection, such as:  ? Increased pain, swelling, warmth, or redness. ? Red streaks leading from the sore area. ? Pus draining from a place on your foot. ? A fever. · Your foot is numb or tingly. Watch closely for changes in your health, and be sure to contact your doctor if:  · You do not get better as expected. · You have bruises from an injury that last longer than 2 weeks. Where can you learn more? Go to https://[email or web address] org and sign in to your REach account. Enter N089 in the RxVault.in box to learn more about \"Foot Pain: Care Instructions. \"     If you do not have an account, please click on the \"Sign Up Now\" link. Current as of: March 2, 2020               Content Version: 12.5  © 2006-2020 [employer]. Care instructions adapted under license by [facility] ([facility]). If you have questions about a medical condition or this instruction, always ask your healthcare professional. [place] [address] any warranty or liability for your use of this information.

## 2020-09-01 ENCOUNTER — HOSPITAL ENCOUNTER (EMERGENCY)
Age: 45
Discharge: HOME | End: 2020-09-01
Attending: EMERGENCY MEDICINE
Payer: MEDICAID

## 2020-09-01 VITALS
DIASTOLIC BLOOD PRESSURE: 80 MMHG | BODY MASS INDEX: 22.73 KG/M2 | WEIGHT: 150 LBS | TEMPERATURE: 98.6 F | OXYGEN SATURATION: 97 % | HEIGHT: 68 IN | RESPIRATION RATE: 18 BRPM | SYSTOLIC BLOOD PRESSURE: 116 MMHG | HEART RATE: 102 BPM

## 2020-09-01 LAB
CHP ED QC CHECK: YES
GLUCOSE BLD-MCNC: 97 MG/DL
METER GLUCOSE: 97 MG/DL (ref 74–99)

## 2020-09-01 PROCEDURE — 82962 GLUCOSE BLOOD TEST: CPT

## 2020-09-01 PROCEDURE — 99284 EMERGENCY DEPT VISIT MOD MDM: CPT

## 2020-09-01 PROCEDURE — 99283 EMERGENCY DEPT VISIT LOW MDM: CPT

## 2020-09-01 RX ORDER — GABAPENTIN 300 MG/1
300 CAPSULE ORAL 3 TIMES DAILY
COMMUNITY
End: 2020-11-27

## 2020-09-01 NOTE — ED PROVIDER NOTES
HPI:  9/1/20, Time: 7:55 PM EDT         Brenda Brandt is a 54 y.o. male presenting to the ED for IV heroin overfose, beginning about an hour or so ago. States he had court today and was feeling good and needed to get high to celebrate and fell off the wagon. Hasn't gotten high in months. States he was actually at work when this happened. EMS was called when he went unresponsive. Narcan was given and he states he remembers waking up in the ambulance. He states he was not trying to hurt himself or anyone else he was just trying to get high and over that as he has not done this in a while. The complaint has been persistent, moderate in severity, and worsened by nothing and now resolved. He currently has no complaints. Patient denies fever/chills, cough, congestion, chest pain, shortness of breath, edema, headache, visual disturbances, focal paresthesias, focal weakness, abdominal pain, nausea, vomiting, diarrhea, constipation, dysuria, hematuria, trauma, neck or back pain or other complaints. ROS:   Pertinent positives and negatives are stated within HPI, all other systems reviewed and are negative.      --------------------------------------------- PAST HISTORY ---------------------------------------------  Past Medical History:  has a past medical history of Acute renal failure ([facility] [address] Past Surgical History:  has no past surgical history on file. Social History:  reports that he has been smoking cigarettes. He has a 25.00 pack-year smoking history. He has never used smokeless tobacco. He reports current alcohol use of about 6.0 standard drinks of alcohol per week. He reports previous drug use. Drug: Opiates . Family History: family history includes Diabetes in his mother; No Known Problems in his father. The patients home medications have been reviewed. Allergies: Patient has no known allergies.         ---------------------------------------------------PHYSICAL EXAM--------------------------------------    Constitutional:  Well developed, well nourished, no acute distress, non-toxic appearance   Eyes:  PERRL, conjunctiva normal, EOMI  HENT:  Atraumatic, external ears normal, nose normal, oropharynx moist. Neck- normal range of motion, no nuchal rigidity   Respiratory:  No respiratory distress, normal breath sounds, no rales, no wheezing   Cardiovascular:  Normal rate, normal rhythm, no murmurs, no gallops, no rubs. Radial and DP pulses 2+ bilaterally. GI:  Soft, nondistended, normal bowel sounds, nontender, no rebound, no guarding   :  No costovertebral angle tenderness   Musculoskeletal:  No edema, no tenderness, no deformities. Back- no tenderness  Integument:  Well hydrated, no rash. Adequate perfusion. Neurologic:  Alert & oriented x 3, CN 2-12 normal, normal gait, no focal deficits noted. Psychiatric:  Speech and behavior appropriate       -------------------------------------------------- RESULTS -------------------------------------------------  I have personally reviewed all laboratory and imaging results for this patient. Results are listed below. LABS:  Results for orders placed or performed during the hospital encounter of 09/01/20   POCT Glucose   Result Value Ref Range    Glucose 97 mg/dL    QC OK? yes    POCT Glucose   Result Value Ref Range    Meter Glucose 97 74 - 99 mg/dL       RADIOLOGY:  Interpreted by Radiologist.  No orders to display           ------------------------- NURSING NOTES AND VITALS REVIEWED ---------------------------   The nursing notes within the ED encounter and vital signs as below have been reviewed by myself. /80   Pulse 102   Temp 98.6 °F (37 °C) (Oral)   Resp 18   Ht 5' 8\" (1.727 m)   Wt 150 lb (68 kg)   SpO2 97%   BMI 22.81 kg/m²   Oxygen Saturation Interpretation: Normal    The patients available past medical records and past encounters were reviewed.         ------------------------------ ED COURSE/MEDICAL DECISION MAKING----------------------  Medications - No data to display        Procedures:  none      Medical Decision Making:    Improved, neg acute in ER upon observation, no SI, declines rehab or social work   Patient was explicitly instructed on specific signs and symptoms on which to return to the emergency room for. Patient was instructed to return to the ER for any new or worsening symptoms. Additional discharge instructions were given verbally. All questions were answered. Patient is comfortable and agreeable with discharge plan. Patient in no acute distress and non-toxic in appearance. This patient's ED course included: re-evaluation prior to disposition and a personal history and physicial eaxmination    This patient has remained hemodynamically stable and improved during their ED course. Re-Evaluations:             Re-evaluation. Patients symptoms are improving  Repeat physical examination is not changed        Consultations:             none    Critical Care: none        Counseling: The emergency provider has spoken with the patient and discussed todays results, in addition to providing specific details for the plan of care and counseling regarding the diagnosis and prognosis. Questions are answered at this time and they are agreeable with the plan.       --------------------------------- IMPRESSION AND DISPOSITION ---------------------------------    IMPRESSION  1.  Accidental overdose of heroin, initial encounter [facility])        DISPOSITION  Disposition: Discharge to home  Patient condition is stable                  Merced Horne DO  09/06/20 7015

## 2020-11-27 ENCOUNTER — HOSPITAL ENCOUNTER (EMERGENCY)
Age: 45
Discharge: HOME | End: 2020-11-27
Attending: EMERGENCY MEDICINE
Payer: MEDICAID

## 2020-11-27 VITALS
SYSTOLIC BLOOD PRESSURE: 112 MMHG | TEMPERATURE: 97.1 F | BODY MASS INDEX: 23.57 KG/M2 | OXYGEN SATURATION: 95 % | DIASTOLIC BLOOD PRESSURE: 81 MMHG | WEIGHT: 155 LBS | HEART RATE: 92 BPM | RESPIRATION RATE: 18 BRPM

## 2020-11-27 PROCEDURE — 99283 EMERGENCY DEPT VISIT LOW MDM: CPT

## 2020-11-27 ASSESSMENT — ENCOUNTER SYMPTOMS
EYE PAIN: 0
COUGH: 0
SINUS PRESSURE: 0
NAUSEA: 0
VOMITING: 0
EYE DISCHARGE: 0
EYE REDNESS: 0
WHEEZING: 0
BACK PAIN: 0
ABDOMINAL PAIN: 0
SORE THROAT: 0
DIARRHEA: 0
SHORTNESS OF BREATH: 0

## 2020-11-27 NOTE — ED NOTES
PT concerned that he is in kidney failure due to hiccup and no appetite.  Dr srinivasan Diana, RN  11/27/20 6513

## 2020-11-27 NOTE — CARE COORDINATION
This note will not be viewable in [facility] for the following reason(s). Suspected substance abuse disorder. Peer Recovery Support Note    Name: Purvi Will  Date: 11/27/2020    Chief Complaint   Patient presents with    Drug Overdose       Peer Support met with patient.   [x] Support and education provided  [x] Resources provided   [] Treatment referral:   [] Other:   [] Patient declined peer recovery services     Referred By:     Notes:     Signed: Ela Pickett, 11/27/2020

## 2020-11-27 NOTE — ED PROVIDER NOTES
This patient reports he was at his girlfriend's house last night. Earlier this morning, he used heroin. He does admit he has a problem with heroin and has been seen at least one previous time for heroin overdose in September of this year. EMS administered Narcan by arrival, patient was awake, alert and oriented. He is requesting information about detox programs. The history is provided by the patient. Drug / Alcohol Assessment   This is a recurrent problem. The current episode started 1 to 2 hours ago. The problem has been resolved. Pertinent negatives include no chest pain, no abdominal pain, no headaches and no shortness of breath. Nothing aggravates the symptoms. Nothing relieves the symptoms. He has tried nothing for the symptoms. Review of Systems   Constitutional: Negative for chills and fever. HENT: Negative for ear pain, sinus pressure and sore throat. Eyes: Negative for pain, discharge and redness. Respiratory: Negative for cough, shortness of breath and wheezing. Cardiovascular: Negative for chest pain. Gastrointestinal: Negative for abdominal pain, diarrhea, nausea and vomiting. Genitourinary: Negative for dysuria and frequency. Musculoskeletal: Negative for arthralgias and back pain. Skin: Negative for rash and wound. Neurological: Negative for weakness and headaches. Hematological: Negative for adenopathy. All other systems reviewed and are negative. Physical Exam  Vitals signs and nursing note reviewed. Constitutional:       Appearance: He is well-developed. HENT:      Head: Normocephalic and atraumatic. Eyes:      Pupils: Pupils are equal, round, and reactive to light. Neck:      Musculoskeletal: Normal range of motion and neck supple. Cardiovascular:      Rate and Rhythm: Normal rate and regular rhythm. Heart sounds: Normal heart sounds. No murmur. Pulmonary:      Effort: Pulmonary effort is normal. No respiratory distress.       Breath sounds: Normal breath sounds. No wheezing or rales. Abdominal:      General: Bowel sounds are normal.      Palpations: Abdomen is soft. Tenderness: There is no abdominal tenderness. There is no guarding or rebound. Skin:     General: Skin is warm and dry. Comments: There is a small area of ecchymosis to the left chest wall with a small abrasion. This appears old in nature and with yellowing of the bruise. No tenderness to palpation. Neurological:      Mental Status: He is alert and oriented to person, place, and time. Cranial Nerves: No cranial nerve deficit. Coordination: Coordination normal.          Procedures     MDM     7:10 AM EST  Consult was placed to our peer to peer counselor. 11:07 AM EST  Patient was seen by the peer to peer counselor. They discussed treatment options. The patient was given information. Patient is remained alert and oriented. He is ready for discharge at this time. --------------------------------------------- PAST HISTORY ---------------------------------------------  Past Medical History:  has a past medical history of Acute renal failure ([facility] [address] Past Surgical History:  has no past surgical history on file. Social History:  reports that he has been smoking cigarettes. He has a 25.00 pack-year smoking history. He has never used smokeless tobacco. He reports current alcohol use of about 6.0 standard drinks of alcohol per week. He reports previous drug use. Drug: Opiates . Family History: family history includes Diabetes in his mother; No Known Problems in his father. The patients home medications have been reviewed. Allergies: Patient has no known allergies. -------------------------------------------------- RESULTS -------------------------------------------------  Labs:  No results found for this visit on 11/27/20.     Radiology:  No orders to display       ------------------------- NURSING NOTES AND VITALS REVIEWED ---------------------------  Date / Time Roomed:  11/27/2020  6:45 AM  ED Bed Assignment:  [id number]    The nursing notes within the ED encounter and vital signs as below have been reviewed. /81   Pulse 92   Temp 97.1 °F (36.2 °C) (Axillary)   Resp 18   Wt 155 lb (70.3 kg)   SpO2 95%   BMI 23.57 kg/m²   Oxygen Saturation Interpretation: Normal      ------------------------------------------ PROGRESS NOTES ------------------------------------------  11:08 AM EST  I have spoken with the patient and discussed todays results, in addition to providing specific details for the plan of care and counseling regarding the diagnosis and prognosis. Their questions are answered at this time and they are agreeable with the plan. I discussed at length with them reasons for immediate return here for re evaluation. They will followup with their primary care physician by calling their office tomorrow. --------------------------------- ADDITIONAL PROVIDER NOTES ---------------------------------  At this time the patient is without objective evidence of an acute process requiring hospitalization or inpatient management. They have remained hemodynamically stable throughout their entire ED visit and are stable for discharge with outpatient follow-up. The plan has been discussed in detail and they are aware of the specific conditions for emergent return, as well as the importance of follow-up. New Prescriptions    No medications on file       Diagnosis:  1. Accidental overdose of heroin, initial encounter [facility])        Disposition:  Patient's disposition: Discharge to home  Patient's condition is stable.          Kiki Womack,   11/27/20 1108

## 2020-11-27 NOTE — ED NOTES
Pt arrived via EMS from home after girlfriend found pt unresponsive on floor. EMS effectively treated pt with 2mg Narcan. Pt reports he only \"snorts coke. \" Pt refused IV with EMS.      Juan Carlos Valentino RN  11/27/20 [id number]

## 2020-11-30 ENCOUNTER — HOSPITAL ENCOUNTER (OUTPATIENT)
Age: 45
Discharge: HOME | End: 2020-11-30
Payer: MEDICAID

## 2020-11-30 LAB
ANION GAP SERPL CALCULATED.3IONS-SCNC: 11 MMOL/L (ref 7–16)
BUN BLDV-MCNC: 16 MG/DL (ref 6–20)
CALCIUM SERPL-MCNC: 9.4 MG/DL (ref 8.6–10.2)
CHLORIDE BLD-SCNC: 100 MMOL/L (ref 98–107)
CO2: 28 MMOL/L (ref 22–29)
CREAT SERPL-MCNC: 1 MG/DL (ref 0.7–1.2)
GFR AFRICAN AMERICAN: >60
GFR NON-AFRICAN AMERICAN: >60 ML/MIN/1.73
GLUCOSE BLD-MCNC: 132 MG/DL (ref 74–99)
HCT VFR BLD CALC: 44 % (ref 37–54)
HEMOGLOBIN: 15.4 G/DL (ref 12.5–16.5)
MAGNESIUM: 2 MG/DL (ref 1.6–2.6)
MCH RBC QN AUTO: 33.8 PG (ref 26–35)
MCHC RBC AUTO-ENTMCNC: 35 % (ref 32–34.5)
MCV RBC AUTO: 96.7 FL (ref 80–99.9)
PDW BLD-RTO: 12 FL (ref 11.5–15)
PHOSPHORUS: 1.9 MG/DL (ref 2.5–4.5)
PLATELET # BLD: 188 E9/L (ref 130–450)
PMV BLD AUTO: 11.2 FL (ref 7–12)
POTASSIUM SERPL-SCNC: 4.6 MMOL/L (ref 3.5–5)
RBC # BLD: 4.55 E12/L (ref 3.8–5.8)
SODIUM BLD-SCNC: 139 MMOL/L (ref 132–146)
WBC # BLD: 5.8 E9/L (ref 4.5–11.5)

## 2020-11-30 PROCEDURE — 36415 COLL VENOUS BLD VENIPUNCTURE: CPT

## 2020-11-30 PROCEDURE — 83735 ASSAY OF MAGNESIUM: CPT

## 2020-11-30 PROCEDURE — 84100 ASSAY OF PHOSPHORUS: CPT

## 2020-11-30 PROCEDURE — 80048 BASIC METABOLIC PNL TOTAL CA: CPT

## 2020-11-30 PROCEDURE — 85027 COMPLETE CBC AUTOMATED: CPT

## 2021-07-30 ENCOUNTER — APPOINTMENT (OUTPATIENT)
Dept: GENERAL RADIOLOGY | Age: 46
DRG: 469 | End: 2021-07-30
Payer: MEDICAID

## 2021-07-30 ENCOUNTER — APPOINTMENT (OUTPATIENT)
Dept: CT IMAGING | Age: 46
DRG: 469 | End: 2021-07-30
Payer: MEDICAID

## 2021-07-30 ENCOUNTER — HOSPITAL ENCOUNTER (INPATIENT)
Age: 46
LOS: 2 days | Discharge: HOME | DRG: 469 | End: 2021-08-01
Attending: EMERGENCY MEDICINE | Admitting: INTERNAL MEDICINE
Payer: MEDICAID

## 2021-07-30 DIAGNOSIS — E87.5 HYPERKALEMIA: Primary | ICD-10-CM

## 2021-07-30 DIAGNOSIS — M62.82 NON-TRAUMATIC RHABDOMYOLYSIS: ICD-10-CM

## 2021-07-30 DIAGNOSIS — N17.9 AKI (ACUTE KIDNEY INJURY) (HCC): ICD-10-CM

## 2021-07-30 PROBLEM — N19 RENAL FAILURE: Status: ACTIVE | Noted: 2021-07-30

## 2021-07-30 LAB
ALBUMIN SERPL-MCNC: 4.9 G/DL (ref 3.5–5.2)
ALP BLD-CCNC: 108 U/L (ref 40–129)
ALT SERPL-CCNC: 52 U/L (ref 0–40)
ANION GAP SERPL CALCULATED.3IONS-SCNC: 16 MMOL/L (ref 7–16)
ANION GAP SERPL CALCULATED.3IONS-SCNC: 19 MMOL/L (ref 7–16)
AST SERPL-CCNC: 113 U/L (ref 0–39)
BACTERIA: ABNORMAL /HPF
BASOPHILS ABSOLUTE: 0.18 E9/L (ref 0–0.2)
BASOPHILS RELATIVE PERCENT: 0.9 % (ref 0–2)
BILIRUB SERPL-MCNC: 0.3 MG/DL (ref 0–1.2)
BILIRUBIN URINE: NEGATIVE
BLOOD, URINE: ABNORMAL
BUN BLDV-MCNC: 36 MG/DL (ref 6–20)
BUN BLDV-MCNC: 39 MG/DL (ref 6–20)
CALCIUM SERPL-MCNC: 7.5 MG/DL (ref 8.6–10.2)
CALCIUM SERPL-MCNC: 7.8 MG/DL (ref 8.6–10.2)
CHLORIDE BLD-SCNC: 102 MMOL/L (ref 98–107)
CHLORIDE BLD-SCNC: 107 MMOL/L (ref 98–107)
CLARITY: CLEAR
CO2: 19 MMOL/L (ref 22–29)
CO2: 22 MMOL/L (ref 22–29)
COLOR: YELLOW
CREAT SERPL-MCNC: 3 MG/DL (ref 0.7–1.2)
CREAT SERPL-MCNC: 3.7 MG/DL (ref 0.7–1.2)
EKG ATRIAL RATE: 76 BPM
EKG P AXIS: 60 DEGREES
EKG P-R INTERVAL: 162 MS
EKG Q-T INTERVAL: 380 MS
EKG QRS DURATION: 82 MS
EKG QTC CALCULATION (BAZETT): 427 MS
EKG R AXIS: 47 DEGREES
EKG T AXIS: -12 DEGREES
EKG VENTRICULAR RATE: 76 BPM
EOSINOPHILS ABSOLUTE: 0 E9/L (ref 0.05–0.5)
EOSINOPHILS RELATIVE PERCENT: 0 % (ref 0–6)
EPITHELIAL CELLS, UA: ABNORMAL /HPF
GFR AFRICAN AMERICAN: 21
GFR AFRICAN AMERICAN: 27
GFR NON-AFRICAN AMERICAN: 18 ML/MIN/1.73
GFR NON-AFRICAN AMERICAN: 22 ML/MIN/1.73
GLUCOSE BLD-MCNC: 105 MG/DL (ref 74–99)
GLUCOSE BLD-MCNC: 148 MG/DL (ref 74–99)
GLUCOSE URINE: NEGATIVE MG/DL
HCT VFR BLD CALC: 46.5 % (ref 37–54)
HEMOGLOBIN: 15.3 G/DL (ref 12.5–16.5)
KETONES, URINE: NEGATIVE MG/DL
LEUKOCYTE ESTERASE, URINE: ABNORMAL
LYMPHOCYTES ABSOLUTE: 0.59 E9/L (ref 1.5–4)
LYMPHOCYTES RELATIVE PERCENT: 2.6 % (ref 20–42)
MCH RBC QN AUTO: 34.5 PG (ref 26–35)
MCHC RBC AUTO-ENTMCNC: 32.9 % (ref 32–34.5)
MCV RBC AUTO: 104.7 FL (ref 80–99.9)
METER GLUCOSE: 100 MG/DL (ref 74–99)
MONOCYTES ABSOLUTE: 1.37 E9/L (ref 0.1–0.95)
MONOCYTES RELATIVE PERCENT: 6.9 % (ref 2–12)
MYELOCYTE PERCENT: 0.9 % (ref 0–0)
NEUTROPHILS ABSOLUTE: 17.64 E9/L (ref 1.8–7.3)
NEUTROPHILS RELATIVE PERCENT: 88.8 % (ref 43–80)
NITRITE, URINE: NEGATIVE
PDW BLD-RTO: 13.2 FL (ref 11.5–15)
PH UA: 5.5 (ref 5–9)
PLATELET # BLD: 171 E9/L (ref 130–450)
PMV BLD AUTO: 11.3 FL (ref 7–12)
POTASSIUM SERPL-SCNC: 5 MMOL/L (ref 3.5–5)
POTASSIUM SERPL-SCNC: 6.4 MMOL/L (ref 3.5–5)
PROTEIN UA: 100 MG/DL
RBC # BLD: 4.44 E12/L (ref 3.8–5.8)
RBC # BLD: NORMAL 10*6/UL
RBC UA: ABNORMAL /HPF (ref 0–2)
SODIUM BLD-SCNC: 142 MMOL/L (ref 132–146)
SODIUM BLD-SCNC: 143 MMOL/L (ref 132–146)
SPECIFIC GRAVITY UA: >=1.03 (ref 1–1.03)
TOTAL CK: 8983 U/L (ref 20–200)
TOTAL PROTEIN: 7.7 G/DL (ref 6.4–8.3)
TROPONIN, HIGH SENSITIVITY: 248 NG/L (ref 0–11)
TROPONIN, HIGH SENSITIVITY: 281 NG/L (ref 0–11)
UROBILINOGEN, URINE: 0.2 E.U./DL
WBC # BLD: 19.6 E9/L (ref 4.5–11.5)
WBC UA: ABNORMAL /HPF (ref 0–5)

## 2021-07-30 PROCEDURE — 36415 COLL VENOUS BLD VENIPUNCTURE: CPT

## 2021-07-30 PROCEDURE — 96366 THER/PROPH/DIAG IV INF ADDON: CPT

## 2021-07-30 PROCEDURE — 93005 ELECTROCARDIOGRAM TRACING: CPT | Performed by: EMERGENCY MEDICINE

## 2021-07-30 PROCEDURE — 2580000003 HC RX 258: Performed by: EMERGENCY MEDICINE

## 2021-07-30 PROCEDURE — 73552 X-RAY EXAM OF FEMUR 2/>: CPT

## 2021-07-30 PROCEDURE — 6360000004 HC RX CONTRAST MEDICATION: Performed by: RADIOLOGY

## 2021-07-30 PROCEDURE — 2580000003 HC RX 258: Performed by: INTERNAL MEDICINE

## 2021-07-30 PROCEDURE — 80053 COMPREHEN METABOLIC PANEL: CPT

## 2021-07-30 PROCEDURE — 82550 ASSAY OF CK (CPK): CPT

## 2021-07-30 PROCEDURE — 71045 X-RAY EXAM CHEST 1 VIEW: CPT

## 2021-07-30 PROCEDURE — 87040 BLOOD CULTURE FOR BACTERIA: CPT

## 2021-07-30 PROCEDURE — 6360000002 HC RX W HCPCS: Performed by: INTERNAL MEDICINE

## 2021-07-30 PROCEDURE — 99284 EMERGENCY DEPT VISIT MOD MDM: CPT

## 2021-07-30 PROCEDURE — 82962 GLUCOSE BLOOD TEST: CPT

## 2021-07-30 PROCEDURE — 2500000003 HC RX 250 WO HCPCS: Performed by: EMERGENCY MEDICINE

## 2021-07-30 PROCEDURE — 80048 BASIC METABOLIC PNL TOTAL CA: CPT

## 2021-07-30 PROCEDURE — 73502 X-RAY EXAM HIP UNI 2-3 VIEWS: CPT

## 2021-07-30 PROCEDURE — 2060000000 HC ICU INTERMEDIATE R&B

## 2021-07-30 PROCEDURE — 85025 COMPLETE CBC W/AUTO DIFF WBC: CPT

## 2021-07-30 PROCEDURE — 74176 CT ABD & PELVIS W/O CONTRAST: CPT

## 2021-07-30 PROCEDURE — 6370000000 HC RX 637 (ALT 250 FOR IP): Performed by: INTERNAL MEDICINE

## 2021-07-30 PROCEDURE — 6370000000 HC RX 637 (ALT 250 FOR IP): Performed by: EMERGENCY MEDICINE

## 2021-07-30 PROCEDURE — 73060 X-RAY EXAM OF HUMERUS: CPT

## 2021-07-30 PROCEDURE — 72125 CT NECK SPINE W/O DYE: CPT

## 2021-07-30 PROCEDURE — 96365 THER/PROPH/DIAG IV INF INIT: CPT

## 2021-07-30 PROCEDURE — 84484 ASSAY OF TROPONIN QUANT: CPT

## 2021-07-30 PROCEDURE — 81001 URINALYSIS AUTO W/SCOPE: CPT

## 2021-07-30 PROCEDURE — 6360000002 HC RX W HCPCS: Performed by: EMERGENCY MEDICINE

## 2021-07-30 PROCEDURE — 87077 CULTURE AEROBIC IDENTIFY: CPT

## 2021-07-30 PROCEDURE — 87186 SC STD MICRODIL/AGAR DIL: CPT

## 2021-07-30 PROCEDURE — 87088 URINE BACTERIA CULTURE: CPT

## 2021-07-30 PROCEDURE — 2580000003 HC RX 258

## 2021-07-30 PROCEDURE — 70450 CT HEAD/BRAIN W/O DYE: CPT

## 2021-07-30 PROCEDURE — 96375 TX/PRO/DX INJ NEW DRUG ADDON: CPT

## 2021-07-30 RX ORDER — 0.9 % SODIUM CHLORIDE 0.9 %
1000 INTRAVENOUS SOLUTION INTRAVENOUS ONCE
Status: COMPLETED | OUTPATIENT
Start: 2021-07-30 | End: 2021-07-30

## 2021-07-30 RX ORDER — HEPARIN SODIUM 5000 [USP'U]/ML
5000 INJECTION, SOLUTION INTRAVENOUS; SUBCUTANEOUS EVERY 8 HOURS SCHEDULED
Status: DISCONTINUED | OUTPATIENT
Start: 2021-07-30 | End: 2021-08-01 | Stop reason: HOSPADM

## 2021-07-30 RX ORDER — ACETAMINOPHEN 325 MG/1
650 TABLET ORAL EVERY 4 HOURS PRN
Status: DISCONTINUED | OUTPATIENT
Start: 2021-07-30 | End: 2021-08-01 | Stop reason: HOSPADM

## 2021-07-30 RX ORDER — IBUPROFEN 200 MG
800 TABLET ORAL EVERY 6 HOURS PRN
Status: ON HOLD | COMMUNITY
End: 2021-08-01 | Stop reason: HOSPADM

## 2021-07-30 RX ORDER — DEXTROSE MONOHYDRATE 25 G/50ML
25 INJECTION, SOLUTION INTRAVENOUS ONCE
Status: COMPLETED | OUTPATIENT
Start: 2021-07-30 | End: 2021-07-30

## 2021-07-30 RX ORDER — OXYCODONE HYDROCHLORIDE AND ACETAMINOPHEN 5; 325 MG/1; MG/1
1 TABLET ORAL EVERY 6 HOURS PRN
Status: DISCONTINUED | OUTPATIENT
Start: 2021-07-30 | End: 2021-08-01 | Stop reason: HOSPADM

## 2021-07-30 RX ORDER — SODIUM CHLORIDE 9 MG/ML
INJECTION, SOLUTION INTRAVENOUS CONTINUOUS
Status: DISCONTINUED | OUTPATIENT
Start: 2021-07-30 | End: 2021-08-01 | Stop reason: HOSPADM

## 2021-07-30 RX ADMIN — SODIUM CHLORIDE: 9 INJECTION, SOLUTION INTRAVENOUS at 22:26

## 2021-07-30 RX ADMIN — SODIUM BICARBONATE 50 MEQ: 84 INJECTION, SOLUTION INTRAVENOUS at 12:08

## 2021-07-30 RX ADMIN — SODIUM CHLORIDE 1000 ML: 9 INJECTION, SOLUTION INTRAVENOUS at 12:13

## 2021-07-30 RX ADMIN — SODIUM CHLORIDE 1000 ML: 9 INJECTION, SOLUTION INTRAVENOUS at 10:55

## 2021-07-30 RX ADMIN — WATER 1000 MG: 1 INJECTION INTRAMUSCULAR; INTRAVENOUS; SUBCUTANEOUS at 19:59

## 2021-07-30 RX ADMIN — SODIUM CHLORIDE: 9 INJECTION, SOLUTION INTRAVENOUS at 15:21

## 2021-07-30 RX ADMIN — OXYCODONE AND ACETAMINOPHEN 1 TABLET: 5; 325 TABLET ORAL at 19:59

## 2021-07-30 RX ADMIN — CALCIUM GLUCONATE 1000 MG: 98 INJECTION, SOLUTION INTRAVENOUS at 12:21

## 2021-07-30 RX ADMIN — IOHEXOL 50 ML: 240 INJECTION, SOLUTION INTRATHECAL; INTRAVASCULAR; INTRAVENOUS; ORAL at 20:56

## 2021-07-30 RX ADMIN — INSULIN HUMAN 10 UNITS: 100 INJECTION, SOLUTION PARENTERAL at 12:18

## 2021-07-30 RX ADMIN — HEPARIN SODIUM 5000 UNITS: 5000 INJECTION INTRAVENOUS; SUBCUTANEOUS at 21:58

## 2021-07-30 RX ADMIN — DEXTROSE MONOHYDRATE 25 G: 25 INJECTION, SOLUTION INTRAVENOUS at 12:16

## 2021-07-30 ASSESSMENT — ENCOUNTER SYMPTOMS
NAUSEA: 0
DIARRHEA: 0
EYE REDNESS: 0
ABDOMINAL PAIN: 0
ABDOMINAL DISTENTION: 0
CHEST TIGHTNESS: 0
EYE ITCHING: 0
BACK PAIN: 0
WHEEZING: 0
RHINORRHEA: 0
TROUBLE SWALLOWING: 0
VOMITING: 0
CONSTIPATION: 0
SHORTNESS OF BREATH: 0

## 2021-07-30 ASSESSMENT — PAIN DESCRIPTION - FREQUENCY: FREQUENCY: CONTINUOUS

## 2021-07-30 ASSESSMENT — PAIN DESCRIPTION - PAIN TYPE
TYPE: ACUTE PAIN
TYPE: ACUTE PAIN

## 2021-07-30 ASSESSMENT — PAIN SCALES - GENERAL
PAINLEVEL_OUTOF10: 8

## 2021-07-30 NOTE — H&P
Department of Internal Medicine  Internal Medicine Consultation Note    Primary Care Physician: Margo Smith DO   Admitting Physician:  No admitting provider for patient encounter. Admission date and time: 7/30/2021  7:51 AM    Room:  05/05  Admitting diagnosis: No admission diagnoses are documented for this encounter. Patient Name: Joyce Blue  MRN: [id number]    Date of Service: 7/30/2021     Chief complaint:  Musculoskeletal pain secondary to Questionable fall. Left buttock pain     History of present illness: The patient is a 70-year-old patient who presented to the emergency department with complaints of musculoskeletal pain that he felt is probably secondary to a fall-does not recall. Patient states that approximately 1 year ago he had an infection of the left hip/buttock area for which she was hospitalized. 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 States approximately 1 week ago he started having pain in the exact same area. States that the pain has  become progressively worse over the past week. States last night the pain was very bad and he unfortunately had asked her friend for medical [place] that he had taken. He states the friend gave him a pill and called it a 27, he took a hit off of a crack pipe and also took fentanyl. Admits he also ingested alcohol at the same time. Recalls going to bed but does not recall any other events thereafter until awakening this morning and telling his brother that he was having extreme pain. Patient states he honestly does not recall if he was in his bed or laying on the floor. Cannot recall the events this morning other than the pain. States that he has left inner thigh/knee pain as well as swelling. Also admits to right hip pain, left buttock pain, and left shoulder pain. Has not noticed bruising.   After evaluation in the emergency department patient was found to have acute kidney injury probably secondary, rhabdomyolysis as well as an elevated troponin level and hyperkalemia. She was needed further evaluation and treatment and admitted under the service of Dr. Hermes Mendiola and Dr. Singh Coronado. PAST MEDICAL Hx:  Past Medical History:   Diagnosis Date    Acute renal failure ([address]     Drug use        PAST SURGICAL Hx:   History reviewed. No pertinent surgical history. FAMILY Hx:  Family History   Problem Relation Age of Onset    Diabetes Mother     No Known Problems Father        HOME MEDICATIONS:  Prior to Admission medications    Medication Sig Start Date End Date Taking? Authorizing Provider   ibuprofen (ADVIL;MOTRIN) 200 MG tablet Take 800 mg by mouth every 6 hours as needed for Pain   Yes Historical Provider, MD       ALLERGIES:  Patient has no known allergies. SOCIAL Hx:  Social History     Socioeconomic History    Marital status: Single     Spouse name: Not on file    Number of children: Not on file    Years of education: Not on file    Highest education level: Not on file   Occupational History    Not on file   Tobacco Use    Smoking status: Current Every Day Smoker     Packs/day: 1.00     Years: 25.00     Pack years: 25.00     Types: Cigarettes    Smokeless tobacco: Never Used   Vaping Use    Vaping Use: Never used   Substance and Sexual Activity    Alcohol use:  Yes     Alcohol/week: 6.0 standard drinks     Types: 6 Cans of beer per week    Drug use: Not Currently     Types: Opiates      Comment: Patient used to snort heroin, states recent \"crack \" hx    Sexual activity: Not Currently   Other Topics Concern    Not on file   Social History Narrative    Not on file     Social Determinants of Health     Financial Resource Strain:     Difficulty of Paying Living Expenses:    Food Insecurity:     Worried About Running Out of Food in the Last Year:     [address] in the Last Year:    Transportation Needs:     Lack of Transportation (Medical):  Lack of Transportation (Non-Medical):    Physical Activity:     Days of Exercise per Week:     Minutes of Exercise per Session:    Stress:     Feeling of Stress :    Social Connections:     Frequency of Communication with Friends and Family:     Frequency of Social Gatherings with Friends and Family:     Attends Zoro[employer]ism Services:     Active Member of Clubs or Organizations:     Attends Club or Organization Meetings:     Marital Status:    Intimate Partner Violence:     Fear of Current or Ex-Partner:     Emotionally Abused:     Physically Abused:     Sexually Abused:        ROS:  General:   This is fatigue. States he is very tired at the present time. Denies chills, fever, malaise, night sweats or weight loss    Psychological:   Denies anxiety, disorientation or hallucinations    ENT:    Denies epistaxis, headaches, vertigo or visual changes    Cardiovascular:   Denies any chest pain, irregular heartbeats, or palpitations. No paroxysmal nocturnal dyspnea. Respiratory:   Denies shortness of breath, coughing, sputum production, hemoptysis, or wheezing. No orthopnea. Gastrointestinal:   Positive for nausea, denies vomiting, diarrhea, or constipation. Denies any abdominal pain. Denies change in bowel habits or stools. Genito-Urinary:    Denies any urgency, frequency, hematuria. Voiding without difficulty. Musculoskeletal:   See HPI    Neurology:    Denies any headache or focal neurological deficits. Complains of generalized weakness    Derm:    Denies any rashes, ulcers, or excoriations. Denies bruising. Extremities:   Denies any lower extremity swelling or edema. PHYSICAL EXAM:  VITALS:  Blood pressure 99/75, pulse 83, temperature 98.8 °F (37.1 °C), temperature source Oral, resp. rate 16, height 5' 8\" (1.727 m), weight 145 lb (65.8 kg), SpO2 95 %.       CONSTITUTIONAL:    Awake, alert, cooperative, no apparent distress, and appears stated age    EYES:    PERRL, EOMI, sclera clear without icterus, conjunctiva normal    ENT:    Normocephalic, atraumatic, sinuses nontender on palpation. External ears without lesions. Oral pharynx with moist mucus membranes. NECK:    Supple, symmetrical, trachea midline, no adenopathy, thyroid symmetric, not enlarged and no tenderness, skin normal, no bruits, no JVD    HEMATOLOGIC/LYMPHATICS:    No cervical lymphadenopathy and no supraclavicular lymphadenopathy    LUNGS:    Symmetric. No increased work of breathing, good air exchange, clear to auscultation bilaterally, no wheezes, rhonchi, or rales,     CARDIOVASCULAR:    Normal apical impulse, regular rate and rhythm, normal S1 and S2, no S3 or S4, and no murmur noted    ABDOMEN:    Normal contour, normal bowel sounds, soft, non-distended, non-tender, no masses palpated, no hepatosplenomegaly, no rebound or guarding elicited on palpation     MUSCULOSKELETAL:    There is no redness, warmth, or swelling of the joints. Patient observed moving all extremities. Left knee lower medial thigh is swollen. Tender/pain with palpation. Tenderness right posterior hip. Patient does have pain in the left buttock without palpation. No abscess or skin discoloration noted. NEUROLOGIC:    Awake, alert, oriented to name, place and time. Patient is somewhat slow to respond to questions at times. cranial nerves II-XII are grossly intact. Motor is 5 out of 5 bilaterally. SKIN:    No bruising or bleeding. No redness, warmth, or swelling    EXTREMITIES:    Peripheral pulses present.   Swelling noted of the left knee/distal medial thigh    LABORATORY DATA:  CBC with Differential:    Lab Results   Component Value Date    WBC 19.6 07/30/2021    RBC 4.44 07/30/2021    HGB 15.3 07/30/2021    HCT 46.5 07/30/2021     07/30/2021    .7 07/30/2021    MCH 34.5 07/30/2021    MCHC 32.9 07/30/2021    RDW 13.2 07/30/2021    LYMPHOPCT 2.6 07/30/2021    MONOPCT 6.9 07/30/2021    MYELOPCT 0.9 07/30/2021 BASOPCT 0.9 07/30/2021    MONOSABS 1.37 07/30/2021    LYMPHSABS 0.59 07/30/2021    EOSABS 0.00 07/30/2021    BASOSABS 0.18 07/30/2021       ASSESSMENT:  · Acute kidney injury  · Rhabdomyolysis  · Hypotension  · Hyperkalemia  · Generalized musculoskeletal pain  · Exam otitis  · Leukocytosis  · Pain in left buttock with history of questionable abscess treated in July 2020  · Positive drug screen with patient admitting to use of fentanyl, cocaine    PLAN:  Patient will be admitted to the hospital.  Consultation was made to orthopedic surgeon, Dr. Pola Hardin secondary to multiple complaints including left knee pain and swelling. X-rays were obtained of the left femur, right hip, right humerus-no fractures or dislocations noted. Patient does appear to have possible effusion of the left knee. Await orthopedic evaluation. Patient does have leukocytosis and complaint of left buttock pain with history of questionable abscess. Will have infectious disease team evaluate the patient as well. Activity as tolerated. IV fluids this patient does have rhabdomyolysis. Extensive discussion was had with patient and regarding his illicit drug use. Patient states he has been doing well for quite some time but secondary to the left but he did know what else to do which is why he is on meds. Encourage the patient to utilize his PCP for evaluation of pain and treatment. Monitor blood pressures patient has had hypotension and treat accordingly. See orders for further plan of care. The patient was seen, examined and then discussed with Dr. Charla Severance. Daril Dance, APRN - CNP  6:16 PM  7/30/2021    Electronically signed by Daril Dance, APRN - CNP on 7/30/21 at 6:16 PM EDT    I have personally participated in the history, exam, medical decision making with the nurse practitioner on the date of service and I agree with all the pertinent clinical information unless otherwise noted.   I have also reviewed and agree with the past medical, family, and social history unless otherwise noted. Gildardo Razo DO, D.O., [facility]  6:42 PM  7/30/2021       I have personally participated in the medical decision making with the nurse practitioner on the date of service and I agree with all the pertinent clinical information unless otherwise noted. I have also reviewed and agree with the past medical, family, and social history unless otherwise noted.       Gildrado Razo DO, D.O., [facility]  6:43 PM  7/30/2021

## 2021-07-30 NOTE — ED PROVIDER NOTES
Dominique De Guzman is a 51 y.o. male    Chief Complaint   Patient presents with    Other     per patient he is having pain to left thigh, right upper arm, right hip- states he is unaware of how injuries occurred, states he was in extreme pain when he awoke and his brother called for EMS. patient is alert and oriented x 4 at this time, gcs - 15     Chief complaint: Extremity pain    HPI   Patient is a 48-year-old male with a past history of drug abuse who presents with pain to the left medial quadricep, and for right hip and upper arm pain. Patient woke up this morning significant pain which brought him to the emergency room. Last night he took a \"pain pill\" with fentanyl and notes that he got from a friend as well as smoked crack. He also had at least 1 alcoholic beverage. Patient believes he may have fallen at some point last night but does not remember the incident even though he states he remembers the entire night. Patient has no other complaints and states that he has a \"sticky eye\" as well. He has not taken anything for his pain this morning. He states that his pain is a 8 out of 10 severity. Cramping in nature. The pain is located mostly in the left thigh, right hip and right arm. Moderate in severity. Nothing makes better. Nothing is worse. No treatment prior to arrival.    Review of Systems   Constitutional: Negative for appetite change, fatigue and fever. HENT: Negative for congestion, rhinorrhea and trouble swallowing. Eyes: Negative for redness and itching. Respiratory: Negative for chest tightness, shortness of breath and wheezing. Cardiovascular: Negative for chest pain, palpitations and leg swelling. Gastrointestinal: Negative for abdominal distention, abdominal pain, constipation, diarrhea, nausea and vomiting. Genitourinary: Negative for decreased urine volume, difficulty urinating and frequency. Musculoskeletal: Negative for arthralgias, back pain and myalgias. Patient has pain in his distal medial quadricep on the left leg, right posterior hip and right distal upper arm. Neurological: Negative for dizziness, syncope, weakness, numbness and headaches. Psychiatric/Behavioral: Negative for agitation, behavioral problems, confusion and decreased concentration. The patient is not nervous/anxious. All other systems reviewed and are negative. Physical Exam  Vitals reviewed. Constitutional:       General: He is not in acute distress. Appearance: Normal appearance. He is not ill-appearing. HENT:      Head: Normocephalic and atraumatic. Nose: Nose normal. No congestion or rhinorrhea. Mouth/Throat:      Mouth: Mucous membranes are moist.      Pharynx: Oropharynx is clear. No oropharyngeal exudate or posterior oropharyngeal erythema. Eyes:      Extraocular Movements: Extraocular movements intact. Conjunctiva/sclera: Conjunctivae normal.      Pupils: Pupils are equal, round, and reactive to light. Cardiovascular:      Rate and Rhythm: Normal rate and regular rhythm. Heart sounds: Normal heart sounds. No murmur heard. Pulmonary:      Effort: Pulmonary effort is normal. No respiratory distress. Breath sounds: Normal breath sounds. Abdominal:      General: Abdomen is flat. There is no distension. Tenderness: There is no abdominal tenderness. There is no guarding. Musculoskeletal:         General: Tenderness (Left medial quadricep, mild right posterior hip) present. No swelling. Normal range of motion. Cervical back: Normal range of motion. No rigidity or tenderness. Comments: All compartments of the bilateral upper and lower extremities are soft, there is 2+ distal pulses in bilateral upper and lower extremities. Skin:     General: Skin is warm and dry. Coloration: Skin is not jaundiced or pale. Findings: No bruising or erythema. Neurological:      General: No focal deficit present.       Mental Status: He is alert and oriented to person, place, and time. Cranial Nerves: No cranial nerve deficit. Motor: No weakness. Psychiatric:         Mood and Affect: Mood normal.         Behavior: Behavior normal.         Thought Content: Thought content normal.      Comments: Patient seems cognitively slowed but otherwise intact and normal.          Procedures     MDM     ED Course as of Jul 30 1451 Fri Jul 30, 2021   1129 Check in with patient he is feeling quite a bit better and appears significantly more alert than he was prior.    [KS]      ED Course User Index  [KS] Leticia Robbins MD        EKG: This EKG is signed and interpreted by me. Rate: 113  Rhythm: Sinus  Interpretation: normal axis, no acute changes  Comparison: changes compared to previous EKG    ED Course as of Jul 30 1548 Fri Jul 30, 2021 1129 Check in with patient he is feeling quite a bit better and appears significantly more alert than he was prior.    [KS]      ED Course User Index  [KS] Leticia Robbins MD       --------------------------------------------- PAST HISTORY ---------------------------------------------  Past Medical History:  has a past medical history of Acute renal failure ([facility] [address] Past Surgical History:  has no past surgical history on file. Social History:  reports that he has been smoking cigarettes. He has a 25.00 pack-year smoking history. He has never used smokeless tobacco. He reports current alcohol use of about 6.0 standard drinks of alcohol per week. He reports previous drug use. Drug: Opiates . Family History: family history includes Diabetes in his mother; No Known Problems in his father. The patients home medications have been reviewed. Allergies: Patient has no known allergies.     -------------------------------------------------- RESULTS -------------------------------------------------    LABS:  Results for orders placed or performed during the hospital encounter of 07/30/21   CBC Auto Differential   Result Value Ref Range    WBC 19.6 (H) 4.5 - 11.5 E9/L    RBC 4.44 3.80 - 5.80 E12/L    Hemoglobin 15.3 12.5 - 16.5 g/dL    Hematocrit 46.5 37.0 - 54.0 %    .7 (H) 80.0 - 99.9 fL    MCH 34.5 26.0 - 35.0 pg    MCHC 32.9 32.0 - 34.5 %    RDW 13.2 11.5 - 15.0 fL    Platelets 534 436 - 394 E9/L    MPV 11.3 7.0 - 12.0 fL    Neutrophils % 88.8 (H) 43.0 - 80.0 %    Lymphocytes % 2.6 (L) 20.0 - 42.0 %    Monocytes % 6.9 2.0 - 12.0 %    Eosinophils % 0.0 0.0 - 6.0 %    Basophils % 0.9 0.0 - 2.0 %    Neutrophils Absolute 17.64 (H) 1.80 - 7.30 E9/L    Lymphocytes Absolute 0.59 (L) 1.50 - 4.00 E9/L    Monocytes Absolute 1.37 (H) 0.10 - 0.95 E9/L    Eosinophils Absolute 0.00 (L) 0.05 - 0.50 E9/L    Basophils Absolute 0.18 0.00 - 0.20 E9/L    Myelocyte Percent 0.9 0 - 0 %    RBC Morphology Normal    Comprehensive metabolic panel   Result Value Ref Range    Sodium 143 132 - 146 mmol/L    Potassium 6.4 (H) 3.5 - 5.0 mmol/L    Chloride 102 98 - 107 mmol/L    CO2 22 22 - 29 mmol/L    Anion Gap 19 (H) 7 - 16 mmol/L    Glucose 148 (H) 74 - 99 mg/dL    BUN 39 (H) 6 - 20 mg/dL    CREATININE 3.7 (H) 0.7 - 1.2 mg/dL    GFR Non-African American 18 >=60 mL/min/1.73    GFR African American 21     Calcium 7.8 (L) 8.6 - 10.2 mg/dL    Total Protein 7.7 6.4 - 8.3 g/dL    Albumin 4.9 3.5 - 5.2 g/dL    Total Bilirubin 0.3 0.0 - 1.2 mg/dL    Alkaline Phosphatase 108 40 - 129 U/L    ALT 52 (H) 0 - 40 U/L     (H) 0 - 39 U/L   CK   Result Value Ref Range    Total CK 8,983 (H) 20 - 200 U/L   Troponin   Result Value Ref Range    Troponin, High Sensitivity 281 (H) 0 - 11 ng/L   Urinalysis   Result Value Ref Range    Color, UA Yellow Straw/Yellow    Clarity, UA Clear Clear    Glucose, Ur Negative Negative mg/dL    Bilirubin Urine Negative Negative    Ketones, Urine Negative Negative mg/dL    Specific Gravity, UA >=1.030 1.005 - 1.030    Blood, Urine TRACE (A) Negative    pH, UA 5.5 5.0 - 9.0    Protein,  (A) Negative mg/dL    Urobilinogen, Urine 0.2 <2.0 E.U./dL    Nitrite, Urine Negative Negative    Leukocyte Esterase, Urine TRACE (A) Negative   Troponin   Result Value Ref Range    Troponin, High Sensitivity 248 (H) 0 - 11 ng/L   Microscopic Urinalysis   Result Value Ref Range    WBC, UA 2-5 0 - 5 /HPF    RBC, UA 0-1 0 - 2 /HPF    Epithelial Cells, UA MODERATE /HPF    Bacteria, UA MODERATE (A) None Seen /HPF   Basic Metabolic Panel   Result Value Ref Range    Sodium 142 132 - 146 mmol/L    Potassium 5.0 3.5 - 5.0 mmol/L    Chloride 107 98 - 107 mmol/L    CO2 19 (L) 22 - 29 mmol/L    Anion Gap 16 7 - 16 mmol/L    Glucose 105 (H) 74 - 99 mg/dL    BUN 36 (H) 6 - 20 mg/dL    CREATININE 3.0 (H) 0.7 - 1.2 mg/dL    GFR Non-African American 22 >=60 mL/min/1.73    GFR African American 27     Calcium 7.5 (L) 8.6 - 10.2 mg/dL   POCT Glucose   Result Value Ref Range    Meter Glucose 100 (H) 74 - 99 mg/dL   EKG 12 Lead   Result Value Ref Range    Ventricular Rate 113 BPM    Atrial Rate 113 BPM    P-R Interval 178 ms    QRS Duration 90 ms    Q-T Interval 324 ms    QTc Calculation (Bazett) 444 ms    P Axis 74 degrees    R Axis 89 degrees    T Axis 48 degrees       RADIOLOGY:  XR CHEST PORTABLE   Final Result   No pneumonia or pleural effusion. CT HEAD WO CONTRAST   Final Result   No acute intracranial abnormality. Specifically, there is no acute   intracranial hemorrhage. CT CERVICAL SPINE WO CONTRAST   Final Result   1. There is no acute compression fracture or subluxation of the cervical   spine. 2. Mild multilevel degenerative disc and degenerative joint disease. XR HUMERUS RIGHT (MIN 2 VIEWS)   Final Result   No acute fractures or dislocations in the right humerus. XR HIP RIGHT (2-3 VIEWS)   Final Result   No acute fractures or dislocations in the right hip or pelvis. XR FEMUR LEFT (MIN 2 VIEWS)   Final Result   1. No sign of acute traumatic injury.    2. Mild hip and mild to moderate knee osteoarthritis.                 ------------------------- NURSING NOTES AND VITALS REVIEWED ---------------------------  Date / Time Roomed:  7/30/2021  7:51 AM  ED Bed Assignment:  05/05    The nursing notes within the ED encounter and vital signs as below have been reviewed. Patient Vitals for the past 24 hrs:   BP Temp Temp src Pulse Resp SpO2 Height Weight   07/30/21 1505 -- -- -- 83 16 95 % -- --   07/30/21 1501 99/75 -- -- 82 21 95 % -- --   07/30/21 1422 -- -- -- 80 21 95 % -- --   07/30/21 1401 96/73 -- -- 81 13 94 % -- --   07/30/21 1356 -- -- -- 78 17 97 % -- --   07/30/21 1331 101/72 -- -- 81 16 -- -- --   07/30/21 1329 -- -- -- 80 21 94 % -- --   07/30/21 1301 100/73 -- -- 78 16 95 % -- --   07/30/21 1245 97/75 -- -- 82 17 -- -- --   07/30/21 1232 91/67 -- -- 101 18 -- -- --   07/30/21 1220 (!) 78/56 -- -- 84 19 98 % -- --   07/30/21 1056 80/65 -- -- 97 14 98 % -- --   07/30/21 0819 96/63 98.8 °F (37.1 °C) Oral 110 16 95 % 5' 8\" (1.727 m) 145 lb (65.8 kg)       Oxygen Saturation Interpretation: Normal    ------------------------------------------ PROGRESS NOTES ------------------------------------------  Re-evaluation(s):  Time: 4376  Patients symptoms are improving  Repeat physical examination is not changed    Counseling:  I have spoken with the patient and discussed todays results, in addition to providing specific details for the plan of care and counseling regarding the diagnosis and prognosis. Their questions are answered at this time and they are agreeable with the plan of admission.    --------------------------------- ADDITIONAL PROVIDER NOTES ---------------------------------  Consultations:  Time: 1430. Spoke with Dr. Inocente Rodriguez. Discussed case. They will admit the patient.   This patient's ED course included: a personal history and physicial examination, re-evaluation prior to disposition, cardiac monitoring and complex medical decision making and emergency management    This patient has remained hemodynamically stable during their ED course. Critical care:  Critical Care: Please note that the withdrawal or failure to initiate urgent interventions for this patient would likely result in a life threatening deterioration or permanent disability. Accordingly this patient received 35 minutes of critical care time, excluding separately billable procedures. For treatment of hyperkalemia      Diagnosis:  1. Hyperkalemia    2. Non-traumatic rhabdomyolysis    3. CHERY (acute kidney injury) ([facility] [address]        Disposition:  Patient's disposition: Admit to telemetry  Patient's condition is stable. Agapito Stockton MD  Resident  07/30/21 6528    ATTENDING PROVIDER ATTESTATION:     Gurwinder Sahni presented to the emergency department for evaluation of Other (per patient he is having pain to left thigh, right upper arm, right hip- states he is unaware of how injuries occurred, states he was in extreme pain when he awoke and his brother called for EMS. patient is alert and oriented x 4 at this time, gcs - 15)    I have reviewed and discussed the case, including pertinent history (medical, surgical, family and social) and exam findings with the Resident and the Nurse assigned to Gurwinder Sahni. I have personally performed and/or participated in the history, exam, medical decision making, and procedures and agree with all pertinent clinical information. I have reviewed my findings and recommendations with Gurwinder Sahni and members of family present at the time of disposition. I, Dr. Ted Vásquez am the primary physician of record for this patient.     MDM: The patient is 51 y.o. male  with a past medical history of       Diagnosis Date    Acute renal failure ([facility] [address]     Drug use      presenting to the emergency department with a chief complaint of bilateral extremity cramping   Differential diagnosis includes but not limited to, rhabdomyolysis, acute kidney injury, electrolyte derangement. The patient did have labs and imaging which were reviewed. The patient was treated symptomatically. Patient did have CBC remarkable for leukocytosis 18.6, no obvious source of infection, patient did have CMP remarkable for hyperkalemia and acute kidney injury likely secondary to rhabdomyolysis. Patient did have elevated CK. All compartments of the patient's bilateral upper and lower extremities are soft. No clinical concern for compartment syndrome. All distal pulses were intact. Patient will be admitted for further treatment and evaluation. Critical care:  Critical Care: Please note that the withdrawal or failure to initiate urgent interventions for this patient would likely result in a life threatening deterioration or permanent disability. Accordingly this patient received 35 minutes of critical care time, excluding separately billable procedures. My findings/plan: The primary encounter diagnosis was Hyperkalemia. Diagnoses of Non-traumatic rhabdomyolysis and CHERY (acute kidney injury) ([facility] [address] were also pertinent to this visit.   Current Discharge Medication List        Bernadette Andujar, [facility],   08/01/21 4292

## 2021-07-30 NOTE — Clinical Note
Patient Class: Inpatient [101]   REQUIRED: Diagnosis: Renal failure [460012]   Estimated Length of Stay: Estimated stay of more than 2 midnights   Telemetry/Cardiac Monitoring Required?: Yes

## 2021-07-31 LAB
ALBUMIN SERPL-MCNC: 3.3 G/DL (ref 3.5–5.2)
ALP BLD-CCNC: 74 U/L (ref 40–129)
ALT SERPL-CCNC: 347 U/L (ref 0–40)
ANION GAP SERPL CALCULATED.3IONS-SCNC: 8 MMOL/L (ref 7–16)
AST SERPL-CCNC: 391 U/L (ref 0–39)
BASOPHILS ABSOLUTE: 0.03 E9/L (ref 0–0.2)
BASOPHILS RELATIVE PERCENT: 0.3 % (ref 0–2)
BILIRUB SERPL-MCNC: <0.2 MG/DL (ref 0–1.2)
BUN BLDV-MCNC: 33 MG/DL (ref 6–20)
CALCIUM SERPL-MCNC: 7.6 MG/DL (ref 8.6–10.2)
CHLORIDE BLD-SCNC: 104 MMOL/L (ref 98–107)
CHOLESTEROL, TOTAL: 126 MG/DL (ref 0–199)
CK MB: 160 NG/ML (ref 0–7.7)
CO2: 25 MMOL/L (ref 22–29)
CREAT SERPL-MCNC: 1.6 MG/DL (ref 0.7–1.2)
EOSINOPHILS ABSOLUTE: 0.05 E9/L (ref 0.05–0.5)
EOSINOPHILS RELATIVE PERCENT: 0.6 % (ref 0–6)
FOLATE: 11.6 NG/ML (ref 4.8–24.2)
GFR AFRICAN AMERICAN: 56
GFR NON-AFRICAN AMERICAN: 46 ML/MIN/1.73
GLUCOSE BLD-MCNC: 102 MG/DL (ref 74–99)
HBA1C MFR BLD: 5.2 % (ref 4–5.6)
HCT VFR BLD CALC: 35.5 % (ref 37–54)
HDLC SERPL-MCNC: 45 MG/DL
HEMOGLOBIN: 12.1 G/DL (ref 12.5–16.5)
IMMATURE GRANULOCYTES #: 0.04 E9/L
IMMATURE GRANULOCYTES %: 0.5 % (ref 0–5)
LDL CHOLESTEROL CALCULATED: 58 MG/DL (ref 0–99)
LYMPHOCYTES ABSOLUTE: 1.7 E9/L (ref 1.5–4)
LYMPHOCYTES RELATIVE PERCENT: 19.8 % (ref 20–42)
MAGNESIUM: 2.1 MG/DL (ref 1.6–2.6)
MCH RBC QN AUTO: 34.6 PG (ref 26–35)
MCHC RBC AUTO-ENTMCNC: 34.1 % (ref 32–34.5)
MCV RBC AUTO: 101.4 FL (ref 80–99.9)
MONOCYTES ABSOLUTE: 0.69 E9/L (ref 0.1–0.95)
MONOCYTES RELATIVE PERCENT: 8 % (ref 2–12)
NEUTROPHILS ABSOLUTE: 6.07 E9/L (ref 1.8–7.3)
NEUTROPHILS RELATIVE PERCENT: 70.8 % (ref 43–80)
PDW BLD-RTO: 13.2 FL (ref 11.5–15)
PHOSPHORUS: 3.3 MG/DL (ref 2.5–4.5)
PLATELET # BLD: 126 E9/L (ref 130–450)
PMV BLD AUTO: 11.6 FL (ref 7–12)
POTASSIUM SERPL-SCNC: 4.5 MMOL/L (ref 3.5–5)
RBC # BLD: 3.5 E12/L (ref 3.8–5.8)
SEDIMENTATION RATE, ERYTHROCYTE: 4 MM/HR (ref 0–15)
SODIUM BLD-SCNC: 137 MMOL/L (ref 132–146)
TOTAL CK: ABNORMAL U/L (ref 20–200)
TOTAL PROTEIN: 5.3 G/DL (ref 6.4–8.3)
TRIGL SERPL-MCNC: 117 MG/DL (ref 0–149)
TROPONIN, HIGH SENSITIVITY: 70 NG/L (ref 0–11)
TSH SERPL DL<=0.05 MIU/L-ACNC: 6.46 UIU/ML (ref 0.27–4.2)
VITAMIN B-12: 553 PG/ML (ref 211–946)
VLDLC SERPL CALC-MCNC: 23 MG/DL
WBC # BLD: 8.6 E9/L (ref 4.5–11.5)

## 2021-07-31 PROCEDURE — 85651 RBC SED RATE NONAUTOMATED: CPT

## 2021-07-31 PROCEDURE — 85025 COMPLETE CBC W/AUTO DIFF WBC: CPT

## 2021-07-31 PROCEDURE — 84100 ASSAY OF PHOSPHORUS: CPT

## 2021-07-31 PROCEDURE — 84484 ASSAY OF TROPONIN QUANT: CPT

## 2021-07-31 PROCEDURE — 82550 ASSAY OF CK (CPK): CPT

## 2021-07-31 PROCEDURE — 83735 ASSAY OF MAGNESIUM: CPT

## 2021-07-31 PROCEDURE — 2580000003 HC RX 258: Performed by: INTERNAL MEDICINE

## 2021-07-31 PROCEDURE — 6370000000 HC RX 637 (ALT 250 FOR IP): Performed by: INTERNAL MEDICINE

## 2021-07-31 PROCEDURE — 80053 COMPREHEN METABOLIC PANEL: CPT

## 2021-07-31 PROCEDURE — 80074 ACUTE HEPATITIS PANEL: CPT

## 2021-07-31 PROCEDURE — 36415 COLL VENOUS BLD VENIPUNCTURE: CPT

## 2021-07-31 PROCEDURE — 2060000000 HC ICU INTERMEDIATE R&B

## 2021-07-31 PROCEDURE — 6360000002 HC RX W HCPCS: Performed by: INTERNAL MEDICINE

## 2021-07-31 PROCEDURE — 82746 ASSAY OF FOLIC ACID SERUM: CPT

## 2021-07-31 PROCEDURE — 82607 VITAMIN B-12: CPT

## 2021-07-31 PROCEDURE — 83036 HEMOGLOBIN GLYCOSYLATED A1C: CPT

## 2021-07-31 PROCEDURE — 82553 CREATINE MB FRACTION: CPT

## 2021-07-31 PROCEDURE — 84443 ASSAY THYROID STIM HORMONE: CPT

## 2021-07-31 PROCEDURE — 80061 LIPID PANEL: CPT

## 2021-07-31 PROCEDURE — 93005 ELECTROCARDIOGRAM TRACING: CPT | Performed by: INTERNAL MEDICINE

## 2021-07-31 RX ADMIN — HEPARIN SODIUM 5000 UNITS: 5000 INJECTION INTRAVENOUS; SUBCUTANEOUS at 20:28

## 2021-07-31 RX ADMIN — OXYCODONE AND ACETAMINOPHEN 1 TABLET: 5; 325 TABLET ORAL at 14:26

## 2021-07-31 RX ADMIN — OXYCODONE AND ACETAMINOPHEN 1 TABLET: 5; 325 TABLET ORAL at 02:07

## 2021-07-31 RX ADMIN — SODIUM CHLORIDE: 9 INJECTION, SOLUTION INTRAVENOUS at 14:26

## 2021-07-31 RX ADMIN — HEPARIN SODIUM 5000 UNITS: 5000 INJECTION INTRAVENOUS; SUBCUTANEOUS at 14:28

## 2021-07-31 RX ADMIN — SODIUM CHLORIDE: 9 INJECTION, SOLUTION INTRAVENOUS at 06:19

## 2021-07-31 RX ADMIN — HEPARIN SODIUM 5000 UNITS: 5000 INJECTION INTRAVENOUS; SUBCUTANEOUS at 06:19

## 2021-07-31 RX ADMIN — OXYCODONE AND ACETAMINOPHEN 1 TABLET: 5; 325 TABLET ORAL at 20:28

## 2021-07-31 RX ADMIN — OXYCODONE AND ACETAMINOPHEN 1 TABLET: 5; 325 TABLET ORAL at 08:11

## 2021-07-31 ASSESSMENT — PAIN DESCRIPTION - LOCATION
LOCATION: HIP;LEG
LOCATION: HIP
LOCATION: HIP;LEG;SHOULDER

## 2021-07-31 ASSESSMENT — PAIN DESCRIPTION - PAIN TYPE
TYPE: ACUTE PAIN

## 2021-07-31 ASSESSMENT — PAIN DESCRIPTION - DESCRIPTORS
DESCRIPTORS: ACHING;CONSTANT;DISCOMFORT
DESCRIPTORS: ACHING;DISCOMFORT

## 2021-07-31 ASSESSMENT — PAIN DESCRIPTION - ORIENTATION
ORIENTATION: RIGHT;LEFT
ORIENTATION: LEFT

## 2021-07-31 ASSESSMENT — PAIN SCALES - GENERAL
PAINLEVEL_OUTOF10: 8
PAINLEVEL_OUTOF10: 7

## 2021-07-31 ASSESSMENT — PAIN DESCRIPTION - ONSET
ONSET: ON-GOING
ONSET: GRADUAL

## 2021-07-31 ASSESSMENT — PAIN DESCRIPTION - FREQUENCY
FREQUENCY: CONTINUOUS
FREQUENCY: CONTINUOUS

## 2021-07-31 NOTE — CONSULTS
Department of Orthopedic Surgery  Resident Consult Note  Reason for Consult:  YESENIA MAYNARD pain    HISTORY OF PRESENT ILLNESS:       Patient is a 51 y.o. male with left thigh pain. Patient, who has a history of left buttock pain, was experiencing left buttock pain and took several drugs along with alcohol. He believes he likely passed out and does not know how long he was out for. He awoke with musculoskeletal pain and presented to [address] [place] [address] ED for evaluation. Patient was subsequently admitted for rhabdomyolysis and treatment of CHERY. Orthopedics was consulted secondary to complaints of musculoskeletal pain. On exam, patient is complaining of isolated left thigh pain that he first noticed yesterday morning. He had was ambulatory yesterday and reports that he was \"sore\" in the left thigh when ambulating but was able to tolerate it. Denies numbness/tingling/paresthesias. Denies any other orthopedic complaints at this time. Past Medical History:        Diagnosis Date    Acute renal failure ([address]     Drug use      Past Surgical History:    History reviewed. No pertinent surgical history. Current Medications:   Current Facility-Administered Medications: 0.9 % sodium chloride infusion, , Intravenous, Continuous  oxyCODONE-acetaminophen (PERCOCET) 5-325 MG per tablet 1 tablet, 1 tablet, Oral, Q6H PRN  heparin (porcine) injection 5,000 Units, 5,000 Units, Subcutaneous, 3 times per day  acetaminophen (TYLENOL) tablet 650 mg, 650 mg, Oral, Q4H PRN  cefTRIAXone (ROCEPHIN) 1,000 mg in sterile water 10 mL IV syringe, 1,000 mg, Intravenous, Q24H  Allergies:  Patient has no known allergies. Social History:   TOBACCO:   reports that he has been smoking cigarettes. He has a 25.00 pack-year smoking history. He has never used smokeless tobacco.  ETOH:   reports current alcohol use of about 6.0 standard drinks of alcohol per week. DRUGS:   reports previous drug use. Drug: Opiates .   ACTIVITIES OF DAILY LIVING: OCCUPATION:    Family History:       Problem Relation Age of Onset    Diabetes Mother     No Known Problems Father        REVIEW OF SYSTEMS:  CONSTITUTIONAL:  negative for fevers, chills  EYES:  negative for acute changes  HEENT:  negative for acute changes  RESPIRATORY:  negative for dyspnea  CARDIOVASCULAR:  negative for chest pain, palpitations  GASTROINTESTINAL:  negative for nausea, vomiting  GENITOURINARY:  negative for frequency  HEMATOLOGIC/LYMPHATIC:  negative for bleeding and petechiae  MUSCULOSKELETAL:  positive for left thigh pain  NEUROLOGICAL: Positive for LOC, negative for head trauma  BEHAVIOR/PSYCH:  negative for increased agitation and anxiety    PHYSICAL EXAM:    VITALS:  /72   Pulse 65   Temp 98.6 °F (37 °C) (Oral)   Resp 10   Ht 5' 8\" (1.727 m)   Wt 151 lb 1.6 oz (68.5 kg)   SpO2 99%   BMI 22.97 kg/m²   CONSTITUTIONAL:  awake, alert, cooperative, no apparent distress, and appears stated age    MUSCULOSKELETAL:  Left lower Extremity:  No obvious signs of trauma  Mild edema to the distal aspect of the VMO muscle  No effusion appreciated at the knee  Compartments soft and compressible  Able to tolerate PROM/AROM of the knee and hip  Calf soft and non tender  +PF/DF/EHL  +2/4 DP & PT pulses, Brisk Cap refill, Toes warm and perfused  Distal sensation grossly intact to Peroneals, Sural, Saphenous, and tibial nrs    Secondary Exam:   · Bilateral UE: No obvious signs of trauma. -TTP to fingers, hand, wrist, forearm, elbow, humerus, shoulder or clavicle. -- Patient able to flex/extend fingers, wrist, elbow and shoulder with active and passive ROM without pain, +2/4 Radial pulse, cap refill <3sec, +AIN/PIN/Radial/Ulnar/Median N, distal sensation grossly intact to C4-T1 dermatomes, compartments soft and compressible. · Right LE: No obvious signs of trauma.    -TTP to foot, ankle, leg, knee, thigh, hip.-- Patient able to flex/extend toes, ankle, knee and hip with active and passive ROM without pain,+2/4 DP & PT pulses, cap refill <3sec, +5/5 PF/DF/EHL, distal sensation grossly intact to L4-S1 dermatomes, compartments soft and compressible. · Pelvis: -TTP, -Log roll, -Heel strike     DATA:    CBC:   Lab Results   Component Value Date    WBC 8.6 07/31/2021    RBC 3.50 07/31/2021    HGB 12.1 07/31/2021    HCT 35.5 07/31/2021    .4 07/31/2021    MCH 34.6 07/31/2021    MCHC 34.1 07/31/2021    RDW 13.2 07/31/2021     07/31/2021    MPV 11.6 07/31/2021     PT/INR:    Lab Results   Component Value Date    PROTIME 11.6 07/01/2020    INR 1.0 07/01/2020       Radiology Review:  X-ray left femur, right hip, right humerus show no acute osseous abnormalities. No dislocations or fractures appreciated. IMPRESSION:  · Left medial compartment edema in the setting of rhabdomyolysis    PLAN:  · Left thigh compartments were soft and compressible and patient was able to tolerate ROM about the knee and hip. Concern for compartment syndrome is very low at this time.   · Ace wrap to left lower extremity for edema control  · Rest, ice, elevate left lower extremity  · Recommend PT/OT  · Weightbearing as tolerated all extremities  · Multimodal pain control per admitting service  · DVT prophylaxis per admitting service  · Plan was discussed with attending    All questions were sought and answered during encounter    Electronically signed by Angeli Fernandez DO on 7/31/2021 at 9:44 AM

## 2021-07-31 NOTE — PROGRESS NOTES
Internal Medicine Progress Note    [facility]    Werotamar Kendra. Nadeen Sierra., F.A.C.O.I. Bhakti Sauer D.O., ALEXOFrancescoI. Alger Dakins, D.O. Loretta Buitrago, MSN, APRN, NP-C  Aren Weston. Jana Hampton, MSN, APRN-CNP     Primary Care Physician: Eli Mejia DO   Admitting Physician:  Petros Durant DO  Admission date and time: 7/30/2021  7:51 AM    Room:  [address] [place] [address]  Admitting diagnosis: Renal failure [N19]  Hyperkalemia [E87.5]    Patient Name: Fior Vizcarra  MRN: [id number]    Date of Service: 7/31/2021     Subjective:  Barbara Palencia is a 51 y.o. male who was seen and examined today,7/31/2021, at the bedside. Barbara Palencia states he is feeling much better today. We discussed his downward trending creatinine with better urinary output. We discussed his illicit drug use at length. We discussed the results of the imaging studies thus far. He is anxious for discharge home. Review of System:   Constitutional:   Denies fever or chills, weight loss or gain, fatigue or malaise. HEENT:   Denies ear pain, sore throat, sinus or eye problems. Cardiovascular:   Denies any chest pain, irregular heartbeats, or palpitations. Respiratory:   Denies shortness of breath, coughing, sputum production, hemoptysis, or wheezing. Gastrointestinal:   Denies nausea, vomiting, diarrhea, or constipation. Denies any abdominal pain. Genitourinary:    Denies any urgency, frequency, hematuria. Voiding  without difficulty. Extremities:   Admits to bilateral muscle soreness  Neurology:    Denies any headache or focal neurological deficits, Denies generalized weakness or memory difficulty. Psch:   Denies being anxious or depressed. Musculoskeletal:    Denies  myalgias, joint complaints or back pain. Integumentary:   Denies any rashes, ulcers, or excoriations. Denies bruising. Hematologic/Lymphatic:  Denies bruising or bleeding.     Physical Exam:  No intake/output data recorded. Intake/Output Summary (Last 24 hours) at 7/31/2021 1033  Last data filed at 7/31/2021 8463  Gross per 24 hour   Intake 958 ml   Output 750 ml   Net 208 ml   I/O last 3 completed shifts: In: 958 [I.V.:958]  Out: 750 [Urine:750]  Patient Vitals for the past 96 hrs (Last 3 readings):   Weight   07/30/21 2225 151 lb 1.6 oz (68.5 kg)   07/30/21 0819 145 lb (65.8 kg)     Vital Signs:   Blood pressure 107/72, pulse 65, temperature 98.6 °F (37 °C), temperature source Oral, resp. rate 10, height 5' 8\" (1.727 m), weight 151 lb 1.6 oz (68.5 kg), SpO2 99 %. General appearance:  Alert, responsive, oriented to person, place, and time. Well preserved, alert, no distress. Head:  Normocephalic. No masses, lesions or tenderness. Eyes:  PERRLA. EOMI. Sclera clear. Buccal mucosa moist.  ENT:  Ears normal. Mucosa normal.  Neck:    Supple. Trachea midline. No thyromegaly. No JVD. No bruits. Heart:    Rhythm regular. Rate controlled. No murmurs. Lungs:    Symmetrical. Clear to auscultation bilaterally. No wheezes. No rhonchi. No rales. Abdomen:   Soft. Non-tender. Non-distended. Bowel sounds positive. No organomegaly or masses. No pain on palpation. Extremities:    Peripheral pulses present. No peripheral edema. No ulcers. No cyanosis. No clubbing. Neurologic:    Alert x 3. No focal deficit. Cranial nerves grossly intact. No focal weakness. Psych:   Behavior is normal. Mood appears normal. Speech is not rapid and/or pressured. Musculoskeletal:   Spine ROM normal. Muscular strength intact. Gait not assessed. Integumentary:  No rashes  Skin normal color and texture.   Genitalia/Breast:  Deferred    Medication:  Scheduled Meds:   heparin (porcine)  5,000 Units Subcutaneous 3 times per day    cefTRIAXone (ROCEPHIN) IV  1,000 mg Intravenous Q24H     Continuous Infusions:   sodium chloride 125 mL/hr at 07/31/21 6825       Objective Data:  CBC with Differential:    Lab Results   Component Value Date    WBC 8.6 07/31/2021    RBC 3.50 07/31/2021    HGB 12.1 07/31/2021    HCT 35.5 07/31/2021     07/31/2021    .4 07/31/2021    MCH 34.6 07/31/2021    MCHC 34.1 07/31/2021    RDW 13.2 07/31/2021    LYMPHOPCT 19.8 07/31/2021    MONOPCT 8.0 07/31/2021    MYELOPCT 0.9 07/30/2021    BASOPCT 0.3 07/31/2021    MONOSABS 0.69 07/31/2021    LYMPHSABS 1.70 07/31/2021    EOSABS 0.05 07/31/2021    BASOSABS 0.03 07/31/2021     BMP:    Lab Results   Component Value Date     07/31/2021    K 4.5 07/31/2021    K 4.3 07/02/2020     07/31/2021    CO2 25 07/31/2021    BUN 33 07/31/2021    LABALBU 3.3 07/31/2021    CREATININE 1.6 07/31/2021    CALCIUM 7.6 07/31/2021    GFRAA 56 07/31/2021    LABGLOM 46 07/31/2021    GLUCOSE 102 07/31/2021       Assessment:  1. Acute renal failure in the setting of cocaine induced rhabdomyolysis complicated by fall at home  2. Illicit drug use with fentanyl and cocaine    Plan:   IV fluid resuscitation is being undertaken. Creatinine is trending downward accordingly. There has been a mild bump in his CK in the setting of rhabdomyolysis. The orthopedic team has provided consultation with no findings to suggest compartment syndrome. We will maintain IV fluid resuscitation for an additional 24 hours. We discussed the absolute need for illicit drug cessation. He will become increasingly ambulatory. I anticipate discharging the patient home tomorrow pending ongoing improvement in his laboratory parameters. Continue current therapy. See orders for further plan of care. More than 50% of my  time was spent at the bedside counseling/coordinating care with the patient and/or family with face to face contact. This time was spent reviewing notes and laboratory data as well as instructing and counseling the patient. Time I spent with the family or surrogate(s) is included only if the patient was incapable of providing the necessary information or participating in medical decisions.  I

## 2021-08-01 VITALS
SYSTOLIC BLOOD PRESSURE: 129 MMHG | RESPIRATION RATE: 12 BRPM | DIASTOLIC BLOOD PRESSURE: 88 MMHG | WEIGHT: 151.1 LBS | BODY MASS INDEX: 22.9 KG/M2 | TEMPERATURE: 97.9 F | OXYGEN SATURATION: 99 % | HEIGHT: 68 IN | HEART RATE: 62 BPM

## 2021-08-01 LAB
ALBUMIN SERPL-MCNC: 3.2 G/DL (ref 3.5–5.2)
ALP BLD-CCNC: 68 U/L (ref 40–129)
ALT SERPL-CCNC: 410 U/L (ref 0–40)
ANION GAP SERPL CALCULATED.3IONS-SCNC: 7 MMOL/L (ref 7–16)
AST SERPL-CCNC: 388 U/L (ref 0–39)
BASOPHILS ABSOLUTE: 0.04 E9/L (ref 0–0.2)
BASOPHILS RELATIVE PERCENT: 0.7 % (ref 0–2)
BILIRUB SERPL-MCNC: 0.2 MG/DL (ref 0–1.2)
BUN BLDV-MCNC: 18 MG/DL (ref 6–20)
CALCIUM SERPL-MCNC: 8.1 MG/DL (ref 8.6–10.2)
CHLORIDE BLD-SCNC: 110 MMOL/L (ref 98–107)
CO2: 24 MMOL/L (ref 22–29)
CREAT SERPL-MCNC: 1 MG/DL (ref 0.7–1.2)
EOSINOPHILS ABSOLUTE: 0.05 E9/L (ref 0.05–0.5)
EOSINOPHILS RELATIVE PERCENT: 0.8 % (ref 0–6)
GFR AFRICAN AMERICAN: >60
GFR NON-AFRICAN AMERICAN: >60 ML/MIN/1.73
GLUCOSE BLD-MCNC: 104 MG/DL (ref 74–99)
HCT VFR BLD CALC: 33.9 % (ref 37–54)
HEMOGLOBIN: 11.3 G/DL (ref 12.5–16.5)
IMMATURE GRANULOCYTES #: 0.03 E9/L
IMMATURE GRANULOCYTES %: 0.5 % (ref 0–5)
LYMPHOCYTES ABSOLUTE: 1.59 E9/L (ref 1.5–4)
LYMPHOCYTES RELATIVE PERCENT: 26.2 % (ref 20–42)
MCH RBC QN AUTO: 33.7 PG (ref 26–35)
MCHC RBC AUTO-ENTMCNC: 33.3 % (ref 32–34.5)
MCV RBC AUTO: 101.2 FL (ref 80–99.9)
MONOCYTES ABSOLUTE: 0.53 E9/L (ref 0.1–0.95)
MONOCYTES RELATIVE PERCENT: 8.7 % (ref 2–12)
NEUTROPHILS ABSOLUTE: 3.83 E9/L (ref 1.8–7.3)
NEUTROPHILS RELATIVE PERCENT: 63.1 % (ref 43–80)
PDW BLD-RTO: 12.9 FL (ref 11.5–15)
PLATELET # BLD: 131 E9/L (ref 130–450)
PMV BLD AUTO: 11.6 FL (ref 7–12)
POTASSIUM SERPL-SCNC: 4.4 MMOL/L (ref 3.5–5)
RBC # BLD: 3.35 E12/L (ref 3.8–5.8)
SODIUM BLD-SCNC: 141 MMOL/L (ref 132–146)
T4 FREE: 1.08 NG/DL (ref 0.93–1.7)
TOTAL CK: 8218 U/L (ref 20–200)
TOTAL PROTEIN: 5.1 G/DL (ref 6.4–8.3)
WBC # BLD: 6.1 E9/L (ref 4.5–11.5)

## 2021-08-01 PROCEDURE — 85025 COMPLETE CBC W/AUTO DIFF WBC: CPT

## 2021-08-01 PROCEDURE — 2580000003 HC RX 258: Performed by: INTERNAL MEDICINE

## 2021-08-01 PROCEDURE — 6360000002 HC RX W HCPCS: Performed by: INTERNAL MEDICINE

## 2021-08-01 PROCEDURE — 80053 COMPREHEN METABOLIC PANEL: CPT

## 2021-08-01 PROCEDURE — 6370000000 HC RX 637 (ALT 250 FOR IP): Performed by: INTERNAL MEDICINE

## 2021-08-01 PROCEDURE — 36415 COLL VENOUS BLD VENIPUNCTURE: CPT

## 2021-08-01 PROCEDURE — 84439 ASSAY OF FREE THYROXINE: CPT

## 2021-08-01 PROCEDURE — 82550 ASSAY OF CK (CPK): CPT

## 2021-08-01 RX ADMIN — SODIUM CHLORIDE: 9 INJECTION, SOLUTION INTRAVENOUS at 05:08

## 2021-08-01 RX ADMIN — HEPARIN SODIUM 5000 UNITS: 5000 INJECTION INTRAVENOUS; SUBCUTANEOUS at 05:08

## 2021-08-01 RX ADMIN — OXYCODONE AND ACETAMINOPHEN 1 TABLET: 5; 325 TABLET ORAL at 03:04

## 2021-08-01 ASSESSMENT — PAIN SCALES - GENERAL
PAINLEVEL_OUTOF10: 8
PAINLEVEL_OUTOF10: 4

## 2021-08-01 NOTE — PROGRESS NOTES
Department of Orthopedic Surgery  Resident Progress Note    Patient seen and examined. Pain well controlled and improved. No new complaints. Denies chest pain, shortness of breath, dizziness/lightheadedness. VITALS:  /88   Pulse 62   Temp 97.9 °F (36.6 °C) (Oral)   Resp 12   Ht 5' 8\" (1.727 m)   Wt 151 lb 1.6 oz (68.5 kg)   SpO2 99%   BMI 22.97 kg/m²     General: in no acute distress and alert    MUSCULOSKELETAL:   left lower extremity:  · ACE wrap in place  · Decreased thigh edema  · Compartments soft and compressible  · Calf is soft and nontender  · +PF/DF/EHL  · +2/4 DP & PT pulses, Brisk Cap refill, Toes warm and perfused  · Distal sensation grossly intact to Peroneals, Sural, Saphenous, and tibial nrs    CBC:   Lab Results   Component Value Date    WBC 6.1 08/01/2021    HGB 11.3 08/01/2021    HCT 33.9 08/01/2021     08/01/2021     PT/INR:    Lab Results   Component Value Date    PROTIME 11.6 07/01/2020    INR 1.0 07/01/2020       ASSESSMENT  · Left medial compartment edema in the setting of rhabdomyolysis    PLAN      · Left thigh compartments were soft and compressible and patient was able to tolerate ROM about the knee and hip. Concern for compartment syndrome is very low at this time. · Ace wrap to left lower extremity as needed  · Rest, ice, elevate left lower extremity  · Recommend PT/OT  · Weightbearing as tolerated all extremities  · Multimodal pain control per admitting service  · DVT prophylaxis per admitting service  · Ok to discharge from orthopedic standpoint. Orthopedics will follow the patient peripherally for the remainder of their inpatient stay.   · Follow up outpatient as needed with PCP or Dr. Leslie Salazar     Electronically signed by Janet Samaniego DO on 8/1/2021 at 8:29 AM

## 2021-08-01 NOTE — DISCHARGE SUMMARY
Internal Medicine Progress Note     [facility]     Josh Gagnon. Tk Chao., F.A.C.O.I. Dasha Herrera D.O., ELIGIO.XENAOFrancescoIFrancesco Emmanuel D.O. Jessee Gregory, MSN, APRN, NP-C  Lashawn Members. [facility], MSN, APRN-CNP       Internal Medicine  Discharge Summary    NAME: Dion Call  :  1975  MRN:  [id number]  PCP:Camacho Camacho DO  ADMITTED: 2021      DISCHARGED: 21    ADMITTING PHYSICIAN: Josh Watkins DO    CONSULTANT(S):   IP CONSULT TO ORTHOPEDIC SURGERY     ADMITTING DIAGNOSIS:   Renal failure [N19]  Hyperkalemia [E87.5]     DISCHARGE DIAGNOSES:   1. Acute renal failure in the setting of cocaine induced rhabdomyolysis complicated by fall at home with complete resolution at this point  2. Illicit drug use with fentanyl and cocaine  3. Elevated liver function studies with work-up pending including finalization of hepatitis panel    BRIEF HISTORY OF PRESENT ILLNESS:   The patient is a 44-year-old patient who presented to the emergency department with complaints of musculoskeletal pain that he felt is probably secondary to a fall-does not recall. Patient states that approximately 1 year ago he had an infection of the left hip/buttock area for which she was hospitalized. 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 States approximately 1 week ago he started having pain in the exact same area. States that the pain has  become progressively worse over the past week. States last night the pain was very bad and he unfortunately had asked her friend for medical [place] that he had taken. He states the friend gave him a pill and called it a 27, he took a hit off of a crack pipe and also took fentanyl. Admits he also ingested alcohol at the same time.   Recalls going to bed but does not recall any other events thereafter until awakening this morning and telling his brother that he was having extreme pain. Patient states he honestly does not recall if he was in his bed or laying on the floor. Cannot recall the events this morning other than the pain. States that he has left inner thigh/knee pain as well as swelling. Also admits to right hip pain, left buttock pain, and left shoulder pain. Has not noticed bruising. After evaluation in the emergency department patient was found to have acute kidney injury probably secondary, rhabdomyolysis as well as an elevated troponin level and hyperkalemia. She was needed further evaluation and treatment and admitted under the service of Dr. Rafael Rucker and Dr. Britton Garduno. LABS[de-identified]  Lab Results   Component Value Date    WBC 6.1 08/01/2021    HGB 11.3 (L) 08/01/2021    HCT 33.9 (L) 08/01/2021     08/01/2021     08/01/2021    K 4.4 08/01/2021     (H) 08/01/2021    CREATININE 1.0 08/01/2021    BUN 18 08/01/2021    CO2 24 08/01/2021    GLUCOSE 104 (H) 08/01/2021     (H) 08/01/2021     (H) 08/01/2021    INR 1.0 07/01/2020     Lab Results   Component Value Date    INR 1.0 07/01/2020    INR 0.9 06/25/2020    PROTIME 11.6 07/01/2020    PROTIME 10.4 06/25/2020      Lab Results   Component Value Date    TSH 6.460 (H) 07/31/2021     Lab Results   Component Value Date    TRIG 117 07/31/2021    TRIG 175 (H) 07/15/2020     Lab Results   Component Value Date    HDL 45 07/31/2021    HDL 46 07/15/2020     Lab Results   Component Value Date    LDLCALC 58 07/31/2021    LDLCALC 121 (H) 07/15/2020     Lab Results   Component Value Date    LABA1C 5.2 07/31/2021       IMAGING:  CT ABDOMEN PELVIS WO CONTRAST Additional Contrast? Oral    Result Date: 7/30/2021  EXAMINATION: CT OF THE ABDOMEN AND PELVIS WITHOUT CONTRAST 7/30/2021 8:55 pm TECHNIQUE: CT of the abdomen and pelvis was performed without the administration of intravenous contrast. Multiplanar reformatted images are provided for review.  Dose modulation, iterative reconstruction, and/or weight based adjustment of the mA/kV was utilized to reduce the radiation dose to as low as reasonably achievable. COMPARISON: CT abdomen and pelvis, 07/24/2020 HISTORY: ORDERING SYSTEM PROVIDED HISTORY: fall, pain, ? abscess left buttock TECHNOLOGIST PROVIDED HISTORY: Reason for exam:->fall, pain, ? abscess left buttock Additional Contrast?->Oral Decision Support Exception - unselect if not a suspected or confirmed emergency medical condition->Emergency Medical Condition (MA) FINDINGS: Lower Chest: Pulmonary opacity in the dependent aspect of the right lower lobe may represent atelectasis or pneumonia. Minimal subpleural opacity in the left lower lobe likely represents atelectasis. The heart is normal in size. No pleural or pericardial effusion. Organs: Liver: Mildly enlarged, measuring 20.6 cm in length. Subtle hypodensity along the portal veins suggests periportal edema. Gallbladder: Findings suggestive gallbladder wall thickening and pericholecystic edema. No cholelithiasis is evident by CT. Pancreas: Unremarkable. Spleen:  Unremarkable. Adrenals: Unremarkable. Kidneys: Unremarkable. GI/Bowel: No bowel wall thickening or obstruction. Normal appendix. Pelvis: The urinary bladder and the prostate are grossly unremarkable. Peritoneum/Retroperitoneum: No lymphadenopathy. Mild calcified atherosclerosis in the abdominal aorta and pelvic arteries. No aneurysm. Small volume ascites. No free air. Bones/Soft Tissues: The visualized bones are intact without fracture or focal lesion. 1. No abscess or inflammatory changes are seen in the left buttock. 2. Small volume ascites. 3. Findings suggestive of periportal edema, which is nonspecific. Clinical correlation for hepatitis and cholangitis is recommended. 4. Nonspecific gallbladder wall thickening. If indicated, nuclear medicine hepatobiliary scan may be obtained to evaluate for cystic duct obstruction/acute cholecystitis.  5. Small ill-defined pulmonary opacity in the right lower lobe which may represent atelectasis or pneumonia. XR HUMERUS RIGHT (MIN 2 VIEWS)    Result Date: 7/30/2021  EXAMINATION: TWO XRAY VIEWS OF THE RIGHT HUMERUS 7/30/2021 9:45 am COMPARISON: None. HISTORY: ORDERING SYSTEM PROVIDED HISTORY: pain TECHNOLOGIST PROVIDED HISTORY: Reason for exam:->pain FINDINGS: No acute fractures or dislocations of the right humerus. No aggressive osseous lesions. No abnormal soft tissue edema. No acute fractures or dislocations in the right humerus. XR HIP RIGHT (2-3 VIEWS)    Result Date: 7/30/2021  EXAMINATION: TWO XRAY VIEWS OF THE RIGHT HIP 7/30/2021 9:19 am COMPARISON: None. HISTORY: ORDERING SYSTEM PROVIDED HISTORY: Fall, pain TECHNOLOGIST PROVIDED HISTORY: Reason for exam:->Fall, pain FINDINGS: No acute fractures or dislocations in the pelvis or right hip. Bilateral femoral head contours are intact. Bilateral hip joints are intact. Symphysis pubis is not widened. No acute fractures or dislocations in the right hip or pelvis. XR FEMUR LEFT (MIN 2 VIEWS)    Result Date: 7/30/2021  EXAMINATION: For XRAY VIEWS OF THE LEFT FEMUR 7/30/2021 8:19 am COMPARISON: None. HISTORY: ORDERING SYSTEM PROVIDED HISTORY: Fall, pain TECHNOLOGIST PROVIDED HISTORY: Reason for exam:->Fall, pain FINDINGS: The left femoral head is morphologically normal and lies in anatomic position. Joint space width is minimally narrowed and there is small marginal osteophytes. .  The pelvis and sacrum display normal morphology and alignment. A benign synovial herniation pit lies in the anterolateral femoral neck. Femoral morphology and alignment appears normal.  There is moderate degenerative narrowing of the knee joint. The soft tissues and musculature of the thigh appear well developed and unremarkable. 1.  No sign of acute traumatic injury. 2. Mild hip and mild to moderate knee osteoarthritis.      CT HEAD WO CONTRAST    Result Date: 7/30/2021  EXAMINATION: CT OF THE HEAD WITHOUT CONTRAST  7/30/2021 10:08 am TECHNIQUE: CT of the head was performed without the administration of intravenous contrast. Dose modulation, iterative reconstruction, and/or weight based adjustment of the mA/kV was utilized to reduce the radiation dose to as low as reasonably achievable. COMPARISON: None. HISTORY: ORDERING SYSTEM PROVIDED HISTORY: Fall TECHNOLOGIST PROVIDED HISTORY: Reason for exam:->Fall Has a \"code stroke\" or \"stroke alert\" been called? ->No Decision Support Exception - unselect if not a suspected or confirmed emergency medical condition->Emergency Medical Condition (MA) FINDINGS: BRAIN/VENTRICLES: There is no acute intracranial hemorrhage, mass effect or midline shift. No abnormal extra-axial fluid collection. The gray-white differentiation is maintained without evidence of an acute infarct. There is no evidence of hydrocephalus. ORBITS: The visualized portion of the orbits demonstrate no acute abnormality. SINUSES: The visualized paranasal sinuses and mastoid air cells demonstrate no acute abnormality. SOFT TISSUES/SKULL:  No acute abnormality of the visualized skull or soft tissues. No acute intracranial abnormality. Specifically, there is no acute intracranial hemorrhage. CT CERVICAL SPINE WO CONTRAST    Result Date: 7/30/2021  EXAMINATION: CT OF THE CERVICAL SPINE WITHOUT CONTRAST 7/30/2021 10:08 am TECHNIQUE: CT of the cervical spine was performed without the administration of intravenous contrast. Multiplanar reformatted images are provided for review. Dose modulation, iterative reconstruction, and/or weight based adjustment of the mA/kV was utilized to reduce the radiation dose to as low as reasonably achievable. COMPARISON: None. HISTORY: ORDERING SYSTEM PROVIDED HISTORY: Fall TECHNOLOGIST PROVIDED HISTORY: Reason for exam:->Fall FINDINGS: The ring of C1 is intact as is the dense.   There is no compression fracture of the cervical spine.  No jumped or perched facet is noted. Mild multilevel degenerative disc and degenerative joint disease is noted. The prevertebral soft tissues are unremarkable. The airway is widely patent. Images through the lung apices are negative for a pneumothorax. 1. There is no acute compression fracture or subluxation of the cervical spine. 2. Mild multilevel degenerative disc and degenerative joint disease. XR CHEST PORTABLE    Result Date: 7/30/2021  EXAMINATION: ONE XRAY VIEW OF THE CHEST 7/30/2021 1:19 pm COMPARISON: April 23, 2019 HISTORY: ORDERING SYSTEM PROVIDED HISTORY: leukocytosis TECHNOLOGIST PROVIDED HISTORY: Reason for exam:->leukocytosis FINDINGS: No airspace opacity or pleural effusion. The heart is normal size. No pneumothorax. No free air beneath the hemidiaphragms. No pneumonia or pleural effusion. HOSPITAL COURSE:   In the setting of illicit drug use with intoxication, Rachna Rangel developed acute rhabdomyolysis. This resulted in transient renal failure which resolved following IV fluid resuscitation. It is important to note, he does have elevated liver function studies and at the time of discharge, hepatitis panel is pending. This is likely in the setting of rhabdomyolysis but with his illicit drug use history, acute hepatitis must be ruled out. He has no abdominal pain. He has no hyperbilirubinemia. He understands importance of fluid resuscitation upon discharge. We have discussed the absolute need for cessation of illicit drug use and he voiced understanding and agreement. He was also evaluated by the orthopedic team during the hospitalization to rule out compartment syndrome. This has been ruled out and he has become ambulatory with no residual lower extremity pain or swelling.      BRIEF PHYSICAL EXAMINATION AND LABORATORIES ON DAY OF DISCHARGE:  VITALS:  /88   Pulse 62   Temp 97.9 °F (36.6 °C) (Oral)   Resp 12   Ht 5' 8\" (1.727 m)   Wt 151 lb 1.6 oz (68.5 kg) SpO2 99%   BMI 22.97 kg/m²     HEENT:  PERRLA. EOMI. Sclera clear. Buccal mucosa moist.    Neck:  Supple. Trachea midline. No thyromegaly. No JVD. No bruits. Heart:  Rhythm regular, rate controlled. No murmurs. Lungs:  Symmetrical. Clear to auscultation bilaterally. No wheezes. No rhonchi. No rales. Abdomen: Soft. Non-tender. Non-distended. Bowel sounds positive. No organomegaly or masses. No pain on palpation    Extremities:  Peripheral pulses present. No peripheral edema. No ulcers. Neurologic:  Alert x 3. No focal deficit. Cranial nerves grossly intact. Skin:  No petechia. No hemorrhage. No wounds. DISPOSITION:  The patient's condition is good. At this time the patient is without objective evidence of an acute process requiring continuing hospitalization or inpatient management. They are stable for discharge with outpatient follow-up. I have spoken with the patient and discussed the results of the current hospitalization, in addition to providing specific details for the plan of care and counseling regarding the diagnosis and prognosis. The plan has been discussed in detail and they are aware of the specific conditions for emergent return, as well as the importance of follow-up. Their questions are answered at this time and they are agreeable with the plan for discharge to home    DISCHARGE MEDICATIONS:   There are no discharge medications for this patient. FOLLOW UP/INSTRUCTIONS:  · This patient is instructed to follow-up with his primary care physician. · Patient is instructed to follow-up with the consults listed above as directed by them. · he is instructed to resume home medications and take new medications as indicated in the list above. · If the patient has a recurrence of symptoms, he is instructed to go to the ED.     Preparing for this patient's discharge, including paperwork, orders, instructions, and meeting with patient did require > 40 minutes.     Ela Jacobs DO     8/1/2021  8:57 AM

## 2021-08-02 LAB
HAV IGM SER IA-ACNC: NORMAL
HEPATITIS B CORE IGM ANTIBODY: NORMAL
HEPATITIS B SURFACE ANTIGEN INTERPRETATION: NORMAL
HEPATITIS C ANTIBODY INTERPRETATION: NORMAL
ORGANISM: ABNORMAL
URINE CULTURE, ROUTINE: ABNORMAL

## 2021-08-03 ENCOUNTER — TELEPHONE (OUTPATIENT)
Dept: FAMILY MEDICINE CLINIC | Age: 46
End: 2021-08-03

## 2021-08-03 LAB
EKG ATRIAL RATE: 66 BPM
EKG P AXIS: 71 DEGREES
EKG P-R INTERVAL: 148 MS
EKG Q-T INTERVAL: 416 MS
EKG QRS DURATION: 74 MS
EKG QTC CALCULATION (BAZETT): 436 MS
EKG R AXIS: 61 DEGREES
EKG T AXIS: 18 DEGREES
EKG VENTRICULAR RATE: 66 BPM

## 2021-08-03 NOTE — TELEPHONE ENCOUNTER
[facility] Initial Follow Up Call    Outreach made within 2 business days of discharge: Yes    Patient: Meek Still Patient : 1975   MRN: <[id number]>  Reason for Admission: There are no discharge diagnoses documented for the most recent discharge. Discharge Date: 21       Spoke with: left message with Chaz Tang    Discharge department/facility: [facility]         Scheduled appointment with PCP within 7-14 days    Follow Up  No future appointments.     Henry Monte

## 2021-08-04 LAB
BLOOD CULTURE, ROUTINE: NORMAL
CULTURE, BLOOD 2: NORMAL

## 2022-07-21 ENCOUNTER — HOSPITAL ENCOUNTER (EMERGENCY)
Age: 47
Discharge: HOME | End: 2022-07-21
Attending: EMERGENCY MEDICINE
Payer: MEDICAID

## 2022-07-21 ENCOUNTER — APPOINTMENT (OUTPATIENT)
Dept: CT IMAGING | Age: 47
End: 2022-07-21
Payer: MEDICAID

## 2022-07-21 VITALS
TEMPERATURE: 98.2 F | OXYGEN SATURATION: 100 % | HEIGHT: 68 IN | DIASTOLIC BLOOD PRESSURE: 70 MMHG | SYSTOLIC BLOOD PRESSURE: 132 MMHG | BODY MASS INDEX: 22.73 KG/M2 | HEART RATE: 95 BPM | RESPIRATION RATE: 18 BRPM | WEIGHT: 150 LBS

## 2022-07-21 DIAGNOSIS — S22.31XA CLOSED FRACTURE OF ONE RIB OF RIGHT SIDE, INITIAL ENCOUNTER: Primary | ICD-10-CM

## 2022-07-21 PROCEDURE — 99284 EMERGENCY DEPT VISIT MOD MDM: CPT

## 2022-07-21 PROCEDURE — 6370000000 HC RX 637 (ALT 250 FOR IP): Performed by: EMERGENCY MEDICINE

## 2022-07-21 PROCEDURE — 71250 CT THORAX DX C-: CPT

## 2022-07-21 PROCEDURE — 70450 CT HEAD/BRAIN W/O DYE: CPT

## 2022-07-21 PROCEDURE — 72125 CT NECK SPINE W/O DYE: CPT

## 2022-07-21 RX ORDER — HYDROCODONE BITARTRATE AND ACETAMINOPHEN 5; 325 MG/1; MG/1
1 TABLET ORAL ONCE
Status: COMPLETED | OUTPATIENT
Start: 2022-07-21 | End: 2022-07-21

## 2022-07-21 RX ORDER — HYDROCODONE BITARTRATE AND ACETAMINOPHEN 5; 325 MG/1; MG/1
1 TABLET ORAL 3 TIMES DAILY PRN
Qty: 12 TABLET | Refills: 0 | Status: SHIPPED | OUTPATIENT
Start: 2022-07-21 | End: 2022-07-25

## 2022-07-21 RX ADMIN — HYDROCODONE BITARTRATE AND ACETAMINOPHEN 1 TABLET: 5; 325 TABLET ORAL at 17:10

## 2022-07-21 ASSESSMENT — ENCOUNTER SYMPTOMS
VOMITING: 0
ABDOMINAL PAIN: 0
BACK PAIN: 1
COUGH: 0
NAUSEA: 0
SHORTNESS OF BREATH: 0
PHOTOPHOBIA: 0
COLOR CHANGE: 0

## 2022-07-21 ASSESSMENT — PAIN SCALES - GENERAL
PAINLEVEL_OUTOF10: 10
PAINLEVEL_OUTOF10: 10

## 2022-07-21 ASSESSMENT — PAIN - FUNCTIONAL ASSESSMENT: PAIN_FUNCTIONAL_ASSESSMENT: 0-10

## 2022-07-21 NOTE — ED NOTES
Educated pt on how to use incentive spirometry, observed pt complete task twice, between 2000-2500ml both times.       Josephine Johnson, ARACELI  27/02/33 [id number]

## 2022-07-21 NOTE — Clinical Note
Oleksandr Loving was seen and treated in our emergency department on 7/21/2022. He may return to work on 07/25/2022. If you have any questions or concerns, please don't hesitate to call.       Jenise Shin, DO
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
## 2023-05-15 ENCOUNTER — APPOINTMENT (OUTPATIENT)
Dept: GENERAL RADIOLOGY | Age: 48
End: 2023-05-15
Payer: MEDICAID

## 2023-05-15 ENCOUNTER — HOSPITAL ENCOUNTER (EMERGENCY)
Age: 48
Discharge: HOME | End: 2023-05-15
Attending: STUDENT IN AN ORGANIZED HEALTH CARE EDUCATION/TRAINING PROGRAM
Payer: MEDICAID

## 2023-05-15 VITALS
RESPIRATION RATE: 18 BRPM | OXYGEN SATURATION: 97 % | SYSTOLIC BLOOD PRESSURE: 102 MMHG | TEMPERATURE: 97.2 F | BODY MASS INDEX: 21.52 KG/M2 | WEIGHT: 142 LBS | DIASTOLIC BLOOD PRESSURE: 70 MMHG | HEIGHT: 68 IN | HEART RATE: 83 BPM

## 2023-05-15 DIAGNOSIS — Z87.891 HISTORY OF SMOKING: ICD-10-CM

## 2023-05-15 DIAGNOSIS — R05.9 COUGH, UNSPECIFIED TYPE: Primary | ICD-10-CM

## 2023-05-15 DIAGNOSIS — R51.9 NONINTRACTABLE EPISODIC HEADACHE, UNSPECIFIED HEADACHE TYPE: ICD-10-CM

## 2023-05-15 PROCEDURE — 71046 X-RAY EXAM CHEST 2 VIEWS: CPT

## 2023-05-15 PROCEDURE — 99283 EMERGENCY DEPT VISIT LOW MDM: CPT

## 2023-05-15 RX ORDER — PREDNISONE 20 MG/1
20 TABLET ORAL 2 TIMES DAILY
Qty: 10 TABLET | Refills: 0 | Status: SHIPPED | OUTPATIENT
Start: 2023-05-15 | End: 2023-05-20

## 2023-05-15 RX ORDER — ALBUTEROL SULFATE 90 UG/1
2 AEROSOL, METERED RESPIRATORY (INHALATION) 4 TIMES DAILY PRN
Qty: 18 G | Refills: 0 | Status: SHIPPED | OUTPATIENT
Start: 2023-05-15

## 2023-05-15 RX ORDER — BENZONATATE 100 MG/1
100 CAPSULE ORAL EVERY 8 HOURS PRN
Qty: 20 CAPSULE | Refills: 0 | Status: SHIPPED | OUTPATIENT
Start: 2023-05-15

## 2023-05-15 ASSESSMENT — PAIN - FUNCTIONAL ASSESSMENT: PAIN_FUNCTIONAL_ASSESSMENT: 0-10

## 2023-05-15 ASSESSMENT — PAIN SCALES - GENERAL: PAINLEVEL_OUTOF10: 5

## 2023-05-15 ASSESSMENT — ENCOUNTER SYMPTOMS
WHEEZING: 0
DIARRHEA: 0
SHORTNESS OF BREATH: 0
COUGH: 1
VOMITING: 0
SORE THROAT: 0
ABDOMINAL PAIN: 0
TROUBLE SWALLOWING: 0
NAUSEA: 0

## 2023-05-15 ASSESSMENT — PAIN DESCRIPTION - LOCATION: LOCATION: HEAD

## 2023-05-15 ASSESSMENT — PAIN DESCRIPTION - DESCRIPTORS: DESCRIPTORS: POUNDING

## 2023-05-15 NOTE — ED PROVIDER NOTES
[facility] Emergency  ED Provider Note  Department of Emergency Medicine     ED Room:       Written by: Tyrel Fuentes DO  Patient Name: Ashley Sotelo Date: 5/15/2023 10:22 AM  MRN: [id number]    : 1975        Chief Complaint   Patient presents with    Headache     Bilateral ear pain, productive cough brown thick mucus, nasal congestion x1 week     - Chief complaint    CELIA Carrasco is a 51 y.o. male presenting to the ED for evaluation of Headache (Bilateral ear pain, productive cough brown thick mucus, nasal congestion x1 week )      History obtained from the patient. Patient is a 62-year-old male presenting today via private vehicle for evaluation of episodic headaches x1 month, right ear discomfort and fullness x1 week, cough x1 week and noting brown thick mucus production with nasal congestion over the past 3-4 days. He has not had any fevers, chest pain or palpitations, shortness of breath, abdominal pain, nausea vomiting or diarrhea. Denies any neck pain or stiffness, numbness or weakness anywhere or visual disturbance. He denies any headache at this time. He did take episodic ibuprofen and Tylenol for symptoms which does improve them. He is more concerned about the cough. He does smoke about 1 pack of cigarettes per day but denies any diagnosed history of asthma or COPD. Review of Systems   Constitutional:  Negative for chills and fever. HENT:  Positive for congestion (nasal) and ear pain (right ear, feels full). Negative for ear discharge, sore throat and trouble swallowing. Eyes:  Negative for visual disturbance. Respiratory:  Positive for cough. Negative for shortness of breath and wheezing. Cardiovascular:  Negative for chest pain, palpitations and leg swelling. Gastrointestinal:  Negative for abdominal pain, diarrhea, nausea and vomiting. Musculoskeletal:  Negative for neck pain and neck stiffness.    Neurological:  Positive for

## 2023-12-20 DIAGNOSIS — M79.642 PAIN IN BOTH HANDS: ICD-10-CM

## 2023-12-20 DIAGNOSIS — M79.641 PAIN IN BOTH HANDS: ICD-10-CM

## 2024-05-04 ENCOUNTER — HOSPITAL ENCOUNTER (EMERGENCY)
Age: 49
Discharge: HOME | End: 2024-05-04
Attending: FAMILY MEDICINE
Payer: MEDICAID

## 2024-05-04 VITALS
WEIGHT: 150 LBS | DIASTOLIC BLOOD PRESSURE: 66 MMHG | HEIGHT: 68 IN | TEMPERATURE: 99.3 F | BODY MASS INDEX: 22.73 KG/M2 | SYSTOLIC BLOOD PRESSURE: 97 MMHG | HEART RATE: 52 BPM

## 2024-05-04 DIAGNOSIS — M19.049 ARTHRITIS PAIN OF HAND: Primary | ICD-10-CM

## 2024-05-04 DIAGNOSIS — J02.9 ACUTE PHARYNGITIS, UNSPECIFIED ETIOLOGY: ICD-10-CM

## 2024-05-04 LAB
SPECIMEN SOURCE: NORMAL
STREP A, MOLECULAR: NEGATIVE

## 2024-05-04 PROCEDURE — 87651 STREP A DNA AMP PROBE: CPT

## 2024-05-04 PROCEDURE — 99283 EMERGENCY DEPT VISIT LOW MDM: CPT

## 2024-05-04 RX ORDER — BUSPIRONE HYDROCHLORIDE 15 MG/1
TABLET ORAL EVERY 8 HOURS
COMMUNITY

## 2024-05-04 RX ORDER — TRAZODONE HYDROCHLORIDE 150 MG/1
150 TABLET ORAL NIGHTLY
COMMUNITY

## 2024-05-04 RX ORDER — HYDROXYZINE PAMOATE 50 MG/1
1 CAPSULE ORAL EVERY 8 HOURS
COMMUNITY

## 2024-05-04 RX ORDER — IBUPROFEN 600 MG/1
600 TABLET ORAL 3 TIMES DAILY PRN
Qty: 30 TABLET | Refills: 0 | Status: SHIPPED | OUTPATIENT
Start: 2024-05-04

## 2024-05-04 RX ORDER — BUPRENORPHINE AND NALOXONE 8; 2 MG/1; MG/1
FILM, SOLUBLE BUCCAL; SUBLINGUAL
COMMUNITY
Start: 2024-04-11

## 2024-05-04 ASSESSMENT — PAIN - FUNCTIONAL ASSESSMENT: PAIN_FUNCTIONAL_ASSESSMENT: 0-10

## 2024-05-04 ASSESSMENT — PAIN SCALES - GENERAL: PAINLEVEL_OUTOF10: 7

## 2024-05-04 ASSESSMENT — PAIN DESCRIPTION - ORIENTATION: ORIENTATION: RIGHT;LEFT

## 2024-05-04 ASSESSMENT — PAIN DESCRIPTION - LOCATION: LOCATION: HAND

## 2024-05-04 ASSESSMENT — PAIN DESCRIPTION - DESCRIPTORS: DESCRIPTORS: DULL;ACHING

## 2024-05-04 NOTE — ED PROVIDER NOTES
HPI:  5/4/24,   Time: 9:19 AM EDT         Pratik Mak is a 49 y.o. male presenting to the ED for a 2-day history of bilateral stiffness and some swelling in both hands.  It is and aching and stiffness.  He has a background history of symptoms that it has been diagnosed as Raynaud's syndrome.  He also has a history of arthritic problems and he has a history of being diagnosed with carpal tunnel syndrome.  He also complains of throat irritation, dryness and irritation that started 2 days ago.  He denies nasal congestion or nasal discharge or cough.  He denies fever chills or bodyaches.  He denies headache or nausea or vomiting or diarrhea.  He is able to eat and drink without too much difficulty.      ROS:   Pertinent positives and negatives are stated within HPI, all other systems reviewed and are negative.  --------------------------------------------- PAST HISTORY ---------------------------------------------  Past Medical History:  has a past medical history of Acute renal failure (HCC) and Drug use.    Past Surgical History:  has no past surgical history on file.    Social History:  reports that he has been smoking cigarettes. He has a 25.0 pack-year smoking history. He has never used smokeless tobacco. He reports current alcohol use of about 6.0 standard drinks of alcohol per week. He reports that he does not currently use drugs after having used the following drugs: Opiates .    Family History: family history includes Diabetes in his mother; No Known Problems in his father.     The patient’s home medications have been reviewed.    Allergies: Patient has no known allergies.    -------------------------------------------------- RESULTS -------------------------------------------------  All laboratory and radiology results have been personally reviewed by myself   LABS:  Results for orders placed or performed during the hospital encounter of 05/04/24   Rapid Strep Screen    Specimen: Throat   Result

## 2024-06-30 ENCOUNTER — HOSPITAL ENCOUNTER (EMERGENCY)
Age: 49
Discharge: HOME | End: 2024-06-30
Payer: MEDICAID

## 2024-06-30 VITALS
HEART RATE: 60 BPM | DIASTOLIC BLOOD PRESSURE: 73 MMHG | TEMPERATURE: 98.7 F | RESPIRATION RATE: 16 BRPM | OXYGEN SATURATION: 100 % | SYSTOLIC BLOOD PRESSURE: 110 MMHG

## 2024-06-30 DIAGNOSIS — K08.89 PAIN, DENTAL: Primary | ICD-10-CM

## 2024-06-30 PROCEDURE — 99283 EMERGENCY DEPT VISIT LOW MDM: CPT

## 2024-06-30 RX ORDER — AMOXICILLIN AND CLAVULANATE POTASSIUM 875; 125 MG/1; MG/1
1 TABLET, FILM COATED ORAL 2 TIMES DAILY
Qty: 28 TABLET | Refills: 0 | Status: SHIPPED | OUTPATIENT
Start: 2024-06-30 | End: 2024-07-14

## 2024-06-30 ASSESSMENT — PAIN DESCRIPTION - DESCRIPTORS: DESCRIPTORS: THROBBING

## 2024-06-30 ASSESSMENT — PAIN - FUNCTIONAL ASSESSMENT: PAIN_FUNCTIONAL_ASSESSMENT: 0-10

## 2024-06-30 ASSESSMENT — PAIN DESCRIPTION - LOCATION: LOCATION: TEETH

## 2024-06-30 ASSESSMENT — PAIN SCALES - GENERAL: PAINLEVEL_OUTOF10: 3

## 2024-06-30 NOTE — ED PROVIDER NOTES
may have been completed with a voice recognition program. Efforts were made to edit the dictations but occasionally words are mis-transcribed.)       Medication List        START taking these medications      amoxicillin-clavulanate 875-125 MG per tablet  Commonly known as: AUGMENTIN  Take 1 tablet by mouth 2 times daily for 14 days            ASK your doctor about these medications      albuterol sulfate  (90 Base) MCG/ACT inhaler  Commonly known as: Ventolin HFA  Inhale 2 puffs into the lungs 4 times daily as needed for Wheezing     benzonatate 100 MG capsule  Commonly known as: TESSALON  Take 1 capsule by mouth every 8 hours as needed for Cough     buprenorphine-naloxone 8-2 MG Film SL film  Commonly known as: SUBOXONE     busPIRone 15 MG tablet  Commonly known as: BUSPAR     hydrOXYzine pamoate 50 MG capsule  Commonly known as: VISTARIL     ibuprofen 600 MG tablet  Commonly known as: ADVIL;MOTRIN  Take 1 tablet by mouth 3 times daily as needed for Pain     traZODone 150 MG tablet  Commonly known as: DESYREL               Where to Get Your Medications        These medications were sent to [id number] - [place], [place]  [address] -  [phone] - F [phone]  [address] [address]      Phone: [phone]   amoxicillin-clavulanate 875-125 MG per tablet        Follow-up Information       Follow up With Specialties Details Why Contact Info    f/u with dentist  In 1 week pls call your insurance to find dentist with in your network                Electronically signed by Mellissa Rizvi MD on 6/30/2024 at 12:20 PM         Mellissa Rizvi MD  06/30/24 0713

## 2024-08-01 ENCOUNTER — HOSPITAL ENCOUNTER (OUTPATIENT)
Dept: CT IMAGING | Age: 49
Discharge: HOME | End: 2024-08-01
Payer: MEDICAID

## 2024-08-01 DIAGNOSIS — F17.200 TOBACCO USE DISORDER: ICD-10-CM

## 2024-08-01 DIAGNOSIS — Z12.2 SCREENING FOR MALIGNANT NEOPLASM OF RESPIRATORY ORGAN: ICD-10-CM

## 2024-08-01 DIAGNOSIS — F17.210 CIGARETTE SMOKER: ICD-10-CM

## 2024-08-01 PROCEDURE — 71271 CT THORAX LUNG CANCER SCR C-: CPT

## 2024-08-02 ENCOUNTER — OFFICE VISIT (OUTPATIENT)
Dept: ONCOLOGY | Age: 49
End: 2024-08-02
Payer: MEDICAID

## 2024-08-02 ENCOUNTER — HOSPITAL ENCOUNTER (OUTPATIENT)
Dept: INFUSION THERAPY | Age: 49
Discharge: HOME | End: 2024-08-02
Payer: MEDICAID

## 2024-08-02 VITALS
SYSTOLIC BLOOD PRESSURE: 111 MMHG | WEIGHT: 128.9 LBS | OXYGEN SATURATION: 100 % | BODY MASS INDEX: 19.54 KG/M2 | DIASTOLIC BLOOD PRESSURE: 70 MMHG | HEART RATE: 96 BPM | TEMPERATURE: 97.8 F | HEIGHT: 68 IN

## 2024-08-02 DIAGNOSIS — D69.1 GIANT PLATELET SYNDROME (HCC): ICD-10-CM

## 2024-08-02 DIAGNOSIS — D69.6 THROMBOCYTOPENIA (HCC): ICD-10-CM

## 2024-08-02 DIAGNOSIS — D69.1 GIANT PLATELET SYNDROME (HCC): Primary | ICD-10-CM

## 2024-08-02 LAB
ALBUMIN SERPL-MCNC: 4.7 G/DL (ref 3.5–5.2)
ALP SERPL-CCNC: 87 U/L (ref 40–129)
ALT SERPL-CCNC: 16 U/L (ref 0–40)
ANION GAP SERPL CALCULATED.3IONS-SCNC: 7 MMOL/L (ref 7–16)
AST SERPL-CCNC: 20 U/L (ref 0–39)
BASOPHILS # BLD: 0.05 K/UL (ref 0–0.2)
BASOPHILS NFR BLD: 1 % (ref 0–2)
BILIRUB SERPL-MCNC: 0.6 MG/DL (ref 0–1.2)
BUN SERPL-MCNC: 18 MG/DL (ref 6–20)
CALCIUM SERPL-MCNC: 9.7 MG/DL (ref 8.6–10.2)
CHLORIDE SERPL-SCNC: 102 MMOL/L (ref 98–107)
CLOSURE TME COLL+ADP BLD: 72 SEC (ref 48–103)
CO2 SERPL-SCNC: 29 MMOL/L (ref 22–29)
COLLAGEN EPINEPHRINE TIME: 108 SEC (ref 83–176)
CREAT SERPL-MCNC: 0.8 MG/DL (ref 0.7–1.2)
EOSINOPHIL # BLD: 0.09 K/UL (ref 0.05–0.5)
EOSINOPHILS RELATIVE PERCENT: 2 % (ref 0–6)
ERYTHROCYTE [DISTWIDTH] IN BLOOD BY AUTOMATED COUNT: 12.5 % (ref 11.5–15)
FOLATE SERPL-MCNC: 10.8 NG/ML (ref 4.8–24.2)
GFR, ESTIMATED: >90 ML/MIN/1.73M2
GLUCOSE SERPL-MCNC: 80 MG/DL (ref 74–99)
HCT VFR BLD AUTO: 44.7 % (ref 37–54)
HGB BLD-MCNC: 15.1 G/DL (ref 12.5–16.5)
IMM GRANULOCYTES # BLD AUTO: <0.03 K/UL (ref 0–0.58)
IMM GRANULOCYTES NFR BLD: 0 % (ref 0–5)
IRON SATN MFR SERPL: 55 % (ref 20–55)
IRON SERPL-MCNC: 157 UG/DL (ref 59–158)
LDH SERPL-CCNC: 175 U/L (ref 135–225)
LYMPHOCYTES NFR BLD: 1.29 K/UL (ref 1.5–4)
LYMPHOCYTES RELATIVE PERCENT: 33 % (ref 20–42)
MCH RBC QN AUTO: 33.3 PG (ref 26–35)
MCHC RBC AUTO-ENTMCNC: 33.8 G/DL (ref 32–34.5)
MCV RBC AUTO: 98.5 FL (ref 80–99.9)
MONOCYTES NFR BLD: 0.27 K/UL (ref 0.1–0.95)
MONOCYTES NFR BLD: 7 % (ref 2–12)
NEUTROPHILS NFR BLD: 56 % (ref 43–80)
NEUTS SEG NFR BLD: 2.15 K/UL (ref 1.8–7.3)
PLATELET, FLUORESCENCE: 136 K/UL (ref 130–450)
PMV BLD AUTO: 11.7 FL (ref 7–12)
POTASSIUM SERPL-SCNC: 4.4 MMOL/L (ref 3.5–5)
PROT SERPL-MCNC: 7.5 G/DL (ref 6.4–8.3)
RBC # BLD AUTO: 4.54 M/UL (ref 3.8–5.8)
SEND OUT REPORT: NORMAL
SODIUM SERPL-SCNC: 138 MMOL/L (ref 132–146)
TEST NAME: NORMAL
TIBC SERPL-MCNC: 286 UG/DL (ref 250–450)
VIT B12 SERPL-MCNC: 544 PG/ML (ref 211–946)
WBC OTHER # BLD: 3.9 K/UL (ref 4.5–11.5)

## 2024-08-02 PROCEDURE — 82746 ASSAY OF FOLIC ACID SERUM: CPT

## 2024-08-02 PROCEDURE — 84630 ASSAY OF ZINC: CPT

## 2024-08-02 PROCEDURE — 85576 BLOOD PLATELET AGGREGATION: CPT

## 2024-08-02 PROCEDURE — 85025 COMPLETE CBC W/AUTO DIFF WBC: CPT

## 2024-08-02 PROCEDURE — 4004F PT TOBACCO SCREEN RCVD TLK: CPT | Performed by: STUDENT IN AN ORGANIZED HEALTH CARE EDUCATION/TRAINING PROGRAM

## 2024-08-02 PROCEDURE — 36415 COLL VENOUS BLD VENIPUNCTURE: CPT

## 2024-08-02 PROCEDURE — G8420 CALC BMI NORM PARAMETERS: HCPCS | Performed by: STUDENT IN AN ORGANIZED HEALTH CARE EDUCATION/TRAINING PROGRAM

## 2024-08-02 PROCEDURE — 99204 OFFICE O/P NEW MOD 45 MIN: CPT | Performed by: STUDENT IN AN ORGANIZED HEALTH CARE EDUCATION/TRAINING PROGRAM

## 2024-08-02 PROCEDURE — 82525 ASSAY OF COPPER: CPT

## 2024-08-02 PROCEDURE — 83540 ASSAY OF IRON: CPT

## 2024-08-02 PROCEDURE — 3017F COLORECTAL CA SCREEN DOC REV: CPT | Performed by: STUDENT IN AN ORGANIZED HEALTH CARE EDUCATION/TRAINING PROGRAM

## 2024-08-02 PROCEDURE — G8427 DOCREV CUR MEDS BY ELIG CLIN: HCPCS | Performed by: STUDENT IN AN ORGANIZED HEALTH CARE EDUCATION/TRAINING PROGRAM

## 2024-08-02 PROCEDURE — 83550 IRON BINDING TEST: CPT

## 2024-08-02 PROCEDURE — 80074 ACUTE HEPATITIS PANEL: CPT

## 2024-08-02 PROCEDURE — 87389 HIV-1 AG W/HIV-1&-2 AB AG IA: CPT

## 2024-08-02 PROCEDURE — 82607 VITAMIN B-12: CPT

## 2024-08-02 PROCEDURE — 83615 LACTATE (LD) (LDH) ENZYME: CPT

## 2024-08-02 PROCEDURE — 80053 COMPREHEN METABOLIC PANEL: CPT

## 2024-08-02 NOTE — PROGRESS NOTES
Pratik Mak  1975 49 y.o.      Referring Physician:     PCP: No primary care provider on file.    Vitals:    24 0907   BP: 111/70   Pulse: 96   Temp: 97.8 °F (36.6 °C)   SpO2: 100%        Wt Readings from Last 3 Encounters:   24 58.5 kg (128 lb 14.4 oz)   24 68 kg (150 lb)   23 70.3 kg (155 lb)        Body mass index is 19.6 kg/m².          Chief Complaint: No chief complaint on file.         Cancer Staging   No matching staging information was found for the patient.    Prior Radiation Therapy? NO    Concurrent Chemo/radiation? NO    Prior Chemotherapy? NO    Prior Hormonal Therapy? NO    Head and Neck Cancer? No, patient does NOT have HN cancer.      LMP: na    Age at first Menses: na    : na    Para: na          Current Outpatient Medications:     buprenorphine-naloxone (SUBOXONE) 8-2 MG FILM SL film, , Disp: , Rfl:     ibuprofen (ADVIL;MOTRIN) 600 MG tablet, Take 1 tablet by mouth 3 times daily as needed for Pain, Disp: 30 tablet, Rfl: 0    busPIRone (BUSPAR) 15 MG tablet, Take by mouth every 8 (eight) hours (Patient not taking: Reported on 2024), Disp: , Rfl:     hydrOXYzine pamoate (VISTARIL) 50 MG capsule, Take 1 capsule by mouth every 8 (eight) hours (Patient not taking: Reported on 2024), Disp: , Rfl:     traZODone (DESYREL) 150 MG tablet, Take 1 tablet by mouth nightly at bedtime. (Patient not taking: Reported on 2024), Disp: , Rfl:     albuterol sulfate HFA (VENTOLIN HFA) 108 (90 Base) MCG/ACT inhaler, Inhale 2 puffs into the lungs 4 times daily as needed for Wheezing (Patient not taking: Reported on 2024), Disp: 18 g, Rfl: 0    benzonatate (TESSALON) 100 MG capsule, Take 1 capsule by mouth every 8 hours as needed for Cough (Patient not taking: Reported on 2024), Disp: 20 capsule, Rfl: 0       Past Medical History:   Diagnosis Date    Acute renal failure (HCC)     Drug use        No past surgical history on file.    Family History 
your patient with the [facility] Lung Nodule/Lung Cancer Screening Program, please contact the Nurse Navigator at [phone].            ASSESSMENT      Giant platelets  Mild thrombocytopenia  History of illicit drug abuse, currently sober      PLAN     08/02/2024: Today I I have disclosed his initial PCP workup detecting giant platelets.  I discussed different possibilities of these findings.  He also has a mild thrombocytopenia.  He has background history of drug abuse, currently sober.  Denies of alcohol use.  Also denies known liver disorders.  No significant systemic symptoms.  However he has lost 3 pounds since the spring.  He attributes this due to not eating very much, as well as being very active.  Examination today was largely unremarkable, and upon the end of our discussion he is agreeable for further workup.    New laboratory orders to include CBC, CMP, peripheral smear, B12/folate, copper, zinc, iron studies, HIV screening, acute hepatitis panel, LDH, PFA, flow cytometry  Ultrasound liver/spleen  Also briefly discussed role of bone marrow biopsy if primary hematologic pathology is concern    DISPO:   RTC 3 weeks      Thank you for allowing us to participate in the care of Pratik Mak       Approximately spent 48 minutes on chart review as well as time spent on patient encounter, discussing the laboratory, imaging, and clinical findings; and documentation. I have discussed clinical implications and recommendations on the patient's primary issues. More than 50% of time was spent counseling patient. The patient verbalized understanding.      Ab Vásquez MD  Medical Oncology  [facility]  08/02/2024    [facility]) Office  P: [phone]  F: [phone]    [facility]) Office  P: [phone]  F: [phone]     [facility] ([place]) Office  P: [phone]  F: [phone]

## 2024-08-04 LAB
COPPER SERPL-MCNC: 111.8 UG/DL (ref 70–140)
ZINC SERPL-MCNC: 102.8 UG/DL (ref 60–120)

## 2024-08-05 LAB
HAV IGM SERPL QL IA: NONREACTIVE
HBV CORE IGM SERPL QL IA: NONREACTIVE
HBV SURFACE AG SERPL QL IA: NONREACTIVE
HCV AB SERPL QL IA: NONREACTIVE
HIV 1+2 AB+HIV1 P24 AG SERPL QL IA: NONREACTIVE

## 2024-08-06 LAB — PATH REV BLD -IMP: NORMAL

## 2024-08-07 LAB
SEND OUT REPORT: NORMAL
TEST NAME: NORMAL

## 2024-08-10 LAB
SEND OUT REPORT: NORMAL
TEST NAME: NORMAL

## 2024-08-15 LAB
SEND OUT REPORT: NORMAL
TEST NAME: NORMAL

## 2024-08-19 ENCOUNTER — HOSPITAL ENCOUNTER (OUTPATIENT)
Dept: INFUSION THERAPY | Age: 49
End: 2024-08-19

## 2024-08-21 ENCOUNTER — HOSPITAL ENCOUNTER (OUTPATIENT)
Dept: INFUSION THERAPY | Age: 49
Discharge: HOME | End: 2024-08-21

## 2024-08-21 ENCOUNTER — OFFICE VISIT (OUTPATIENT)
Dept: ONCOLOGY | Age: 49
End: 2024-08-21
Payer: MEDICAID

## 2024-08-21 VITALS
SYSTOLIC BLOOD PRESSURE: 117 MMHG | HEART RATE: 63 BPM | BODY MASS INDEX: 20.03 KG/M2 | TEMPERATURE: 98.7 F | DIASTOLIC BLOOD PRESSURE: 76 MMHG | HEIGHT: 68 IN | OXYGEN SATURATION: 97 % | WEIGHT: 132.2 LBS

## 2024-08-21 DIAGNOSIS — D69.1 GIANT PLATELET SYNDROME (HCC): ICD-10-CM

## 2024-08-21 DIAGNOSIS — D72.810 LYMPHOPENIA: Primary | ICD-10-CM

## 2024-08-21 PROCEDURE — 4004F PT TOBACCO SCREEN RCVD TLK: CPT | Performed by: STUDENT IN AN ORGANIZED HEALTH CARE EDUCATION/TRAINING PROGRAM

## 2024-08-21 PROCEDURE — 3017F COLORECTAL CA SCREEN DOC REV: CPT | Performed by: STUDENT IN AN ORGANIZED HEALTH CARE EDUCATION/TRAINING PROGRAM

## 2024-08-21 PROCEDURE — 99213 OFFICE O/P EST LOW 20 MIN: CPT | Performed by: STUDENT IN AN ORGANIZED HEALTH CARE EDUCATION/TRAINING PROGRAM

## 2024-08-21 PROCEDURE — G8420 CALC BMI NORM PARAMETERS: HCPCS | Performed by: STUDENT IN AN ORGANIZED HEALTH CARE EDUCATION/TRAINING PROGRAM

## 2024-08-21 PROCEDURE — 99213 OFFICE O/P EST LOW 20 MIN: CPT

## 2024-08-21 PROCEDURE — G8427 DOCREV CUR MEDS BY ELIG CLIN: HCPCS | Performed by: STUDENT IN AN ORGANIZED HEALTH CARE EDUCATION/TRAINING PROGRAM

## 2024-08-21 NOTE — PROGRESS NOTES
[facility]ICAL ONCOLOGY  [address] [address]  Dept: [phone]  Loc: [phone]    Naya Del Rio MD   ·   MD Zaina Cano APRN  ·  KARYN Howell  ·  KARYN Buckley        Hematology/Oncology   Clinic Consultation Note              Patient: Pratik Mak,  49 y.o. male    Date of Encounter: 08/21/2024     Referring Provider:  KAROLINA Ware    Reason for Visit:   Giant platelets        PCP:  No primary care provider on file.      Chief Complaint   Patient presents with    Follow-up     Giant platelet syndrome         Subjective:  Pt doing ok. Denies of new issues; here to review workup.     HPI from Initial Outpatient  Consultation  (08/02/2024):  The patient is a 49 y.o. male comes in for evaluation after CBC and differential findings of giant platelets.  PCP labs from 7/2/2024 reported platelet count of 118,000, and reportedly found giant platelets on peripheral review.  Other cellular parameters appear to be largely unremarkable.    Patient was accompanied by his significant other.    He has background history of drug abuse, finished rehabilitation around December 2023.  He does not drink alcohol.  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  He also states that he \"does not eat much.\",  And has been biking more lately.  Denies of night sweats, adenopathy, fevers, chills.    He denies of significant pain.  Denies liver issues.  Denies family history of hematologic disorders/diseases.  Also denies of any easy bruising or bleeding.          Past Medical History:   Diagnosis Date    Acute renal failure (HCC)     Drug use        No past surgical history on file.    Social History     Socioeconomic History    Marital status: Single     Spouse name: Not on file    Number of children: Not on file    Years of

## 2024-09-13 ENCOUNTER — HOSPITAL ENCOUNTER (OUTPATIENT)
Dept: ULTRASOUND IMAGING | Age: 49
Discharge: HOME | End: 2024-09-13
Payer: MEDICAID

## 2024-09-13 DIAGNOSIS — D72.810 LYMPHOPENIA: ICD-10-CM

## 2024-09-13 DIAGNOSIS — D69.1 GIANT PLATELET SYNDROME (HCC): ICD-10-CM

## 2024-09-13 PROCEDURE — 76705 ECHO EXAM OF ABDOMEN: CPT

## 2024-09-18 ENCOUNTER — HOSPITAL ENCOUNTER (OUTPATIENT)
Dept: INFUSION THERAPY | Age: 49
Discharge: HOME | End: 2024-09-18
Payer: MEDICAID

## 2024-09-18 DIAGNOSIS — D69.1 GIANT PLATELET SYNDROME (HCC): Primary | ICD-10-CM

## 2024-09-18 DIAGNOSIS — D72.810 LYMPHOPENIA: ICD-10-CM

## 2024-09-18 LAB
ALBUMIN SERPL-MCNC: 4.9 G/DL (ref 3.5–5.2)
ALP SERPL-CCNC: 91 U/L (ref 40–129)
ALT SERPL-CCNC: 13 U/L (ref 0–40)
ANION GAP SERPL CALCULATED.3IONS-SCNC: 10 MMOL/L (ref 7–16)
AST SERPL-CCNC: 22 U/L (ref 0–39)
BASOPHILS # BLD: 0.05 K/UL (ref 0–0.2)
BASOPHILS NFR BLD: 1 % (ref 0–2)
BILIRUB SERPL-MCNC: 0.6 MG/DL (ref 0–1.2)
BUN SERPL-MCNC: 18 MG/DL (ref 6–20)
CALCIUM SERPL-MCNC: 9.7 MG/DL (ref 8.6–10.2)
CHLORIDE SERPL-SCNC: 102 MMOL/L (ref 98–107)
CO2 SERPL-SCNC: 26 MMOL/L (ref 22–29)
CREAT SERPL-MCNC: 1 MG/DL (ref 0.7–1.2)
EOSINOPHIL # BLD: 0.09 K/UL (ref 0.05–0.5)
EOSINOPHILS RELATIVE PERCENT: 2 % (ref 0–6)
ERYTHROCYTE [DISTWIDTH] IN BLOOD BY AUTOMATED COUNT: 12.5 % (ref 11.5–15)
GFR, ESTIMATED: >90 ML/MIN/1.73M2
GLUCOSE SERPL-MCNC: 79 MG/DL (ref 74–99)
HCT VFR BLD AUTO: 38 % (ref 37–54)
HGB BLD-MCNC: 13.4 G/DL (ref 12.5–16.5)
IMM GRANULOCYTES # BLD AUTO: <0.03 K/UL (ref 0–0.58)
IMM GRANULOCYTES NFR BLD: 0 % (ref 0–5)
LYMPHOCYTES NFR BLD: 1.54 K/UL (ref 1.5–4)
LYMPHOCYTES RELATIVE PERCENT: 27 % (ref 20–42)
MCH RBC QN AUTO: 34.2 PG (ref 26–35)
MCHC RBC AUTO-ENTMCNC: 35.3 G/DL (ref 32–34.5)
MCV RBC AUTO: 96.9 FL (ref 80–99.9)
MONOCYTES NFR BLD: 0.44 K/UL (ref 0.1–0.95)
MONOCYTES NFR BLD: 8 % (ref 2–12)
NEUTROPHILS NFR BLD: 63 % (ref 43–80)
NEUTS SEG NFR BLD: 3.61 K/UL (ref 1.8–7.3)
PLATELET, FLUORESCENCE: 140 K/UL (ref 130–450)
PMV BLD AUTO: 11 FL (ref 7–12)
POTASSIUM SERPL-SCNC: 3.8 MMOL/L (ref 3.5–5)
PROT SERPL-MCNC: 7.4 G/DL (ref 6.4–8.3)
RBC # BLD AUTO: 3.92 M/UL (ref 3.8–5.8)
SODIUM SERPL-SCNC: 138 MMOL/L (ref 132–146)
WBC OTHER # BLD: 5.8 K/UL (ref 4.5–11.5)

## 2024-09-18 PROCEDURE — 85025 COMPLETE CBC W/AUTO DIFF WBC: CPT

## 2024-09-18 PROCEDURE — 86038 ANTINUCLEAR ANTIBODIES: CPT

## 2024-09-18 PROCEDURE — 84155 ASSAY OF PROTEIN SERUM: CPT

## 2024-09-18 PROCEDURE — 83521 IG LIGHT CHAINS FREE EACH: CPT

## 2024-09-18 PROCEDURE — 80053 COMPREHEN METABOLIC PANEL: CPT

## 2024-09-18 PROCEDURE — 84165 PROTEIN E-PHORESIS SERUM: CPT

## 2024-09-18 PROCEDURE — 36415 COLL VENOUS BLD VENIPUNCTURE: CPT

## 2024-09-18 PROCEDURE — 86039 ANTINUCLEAR ANTIBODIES (ANA): CPT

## 2024-09-19 LAB — ANA SER QL IA: NEGATIVE

## 2024-09-20 ENCOUNTER — OFFICE VISIT (OUTPATIENT)
Dept: ONCOLOGY | Age: 49
End: 2024-09-20
Payer: MEDICAID

## 2024-09-20 VITALS
SYSTOLIC BLOOD PRESSURE: 106 MMHG | BODY MASS INDEX: 20 KG/M2 | TEMPERATURE: 98.1 F | DIASTOLIC BLOOD PRESSURE: 74 MMHG | WEIGHT: 132 LBS | HEART RATE: 68 BPM | HEIGHT: 68 IN | OXYGEN SATURATION: 98 %

## 2024-09-20 DIAGNOSIS — D69.1 GIANT PLATELET SYNDROME (HCC): Primary | ICD-10-CM

## 2024-09-20 LAB
FREE KAPPA/LAMBDA RATIO: 1.22 (ref 0.22–1.74)
KAPPA LC FREE SER-MCNC: 18.6 MG/L
LAMBDA LC FREE SERPL-MCNC: 15.3 MG/L (ref 4.2–27.7)

## 2024-09-20 PROCEDURE — G8427 DOCREV CUR MEDS BY ELIG CLIN: HCPCS | Performed by: STUDENT IN AN ORGANIZED HEALTH CARE EDUCATION/TRAINING PROGRAM

## 2024-09-20 PROCEDURE — 3017F COLORECTAL CA SCREEN DOC REV: CPT | Performed by: STUDENT IN AN ORGANIZED HEALTH CARE EDUCATION/TRAINING PROGRAM

## 2024-09-20 PROCEDURE — 99213 OFFICE O/P EST LOW 20 MIN: CPT | Performed by: STUDENT IN AN ORGANIZED HEALTH CARE EDUCATION/TRAINING PROGRAM

## 2024-09-20 PROCEDURE — 4004F PT TOBACCO SCREEN RCVD TLK: CPT | Performed by: STUDENT IN AN ORGANIZED HEALTH CARE EDUCATION/TRAINING PROGRAM

## 2024-09-20 PROCEDURE — G8420 CALC BMI NORM PARAMETERS: HCPCS | Performed by: STUDENT IN AN ORGANIZED HEALTH CARE EDUCATION/TRAINING PROGRAM

## 2024-09-22 LAB
ALBUMIN SERPL-MCNC: 4.2 G/DL (ref 3.5–4.7)
ALPHA1 GLOB SERPL ELPH-MCNC: 0.3 G/DL (ref 0.2–0.4)
ALPHA2 GLOB SERPL ELPH-MCNC: 0.7 G/DL (ref 0.5–1)
B-GLOBULIN SERPL ELPH-MCNC: 1 G/DL (ref 0.8–1.3)
GAMMA GLOB SERPL ELPH-MCNC: 1.1 G/DL (ref 0.7–1.6)
PATHOLOGIST: NORMAL
PROT PATTERN SERPL ELPH-IMP: NORMAL
PROT SERPL-MCNC: 7.2 G/DL

## 2025-06-25 ENCOUNTER — HOSPITAL ENCOUNTER (EMERGENCY)
Age: 50
Discharge: HOME | End: 2025-06-25
Attending: EMERGENCY MEDICINE
Payer: MEDICAID

## 2025-06-25 VITALS
SYSTOLIC BLOOD PRESSURE: 105 MMHG | HEART RATE: 78 BPM | OXYGEN SATURATION: 91 % | TEMPERATURE: 98.5 F | RESPIRATION RATE: 14 BRPM | DIASTOLIC BLOOD PRESSURE: 73 MMHG

## 2025-06-25 DIAGNOSIS — T50.901A ACCIDENTAL OVERDOSE, INITIAL ENCOUNTER: Primary | ICD-10-CM

## 2025-06-25 PROCEDURE — 99284 EMERGENCY DEPT VISIT MOD MDM: CPT

## 2025-06-25 ASSESSMENT — ENCOUNTER SYMPTOMS
RESPIRATORY NEGATIVE: 1
GASTROINTESTINAL NEGATIVE: 1

## 2025-06-25 NOTE — ED PROVIDER NOTES
Patient presents emergency department as an overdose.  Patient used cocaine last night and then took a \"Darlene pill\" this morning.  He denies any suicidal homicidal ideations.  He has no complaints at this time.  He states he did not fall or injure himself.  EMS reports that he has been satting at least 96% for the whole time and is arousable to verbal stimuli.  They have not administered any medications and route.    The history is provided by the patient.       Review of Systems   Constitutional: Negative.    Respiratory: Negative.     Cardiovascular: Negative.    Gastrointestinal: Negative.    Psychiatric/Behavioral: Negative.         Physical Exam  Vitals and nursing note reviewed.   Constitutional:       General: He is not in acute distress.     Appearance: He is well-developed and normal weight. He is not ill-appearing or toxic-appearing.      Comments: Somnolent   HENT:      Head: Normocephalic and atraumatic.      Nose: Nose normal.      Mouth/Throat:      Mouth: Mucous membranes are moist.      Pharynx: Oropharynx is clear.   Eyes:      Pupils: Pupils are equal, round, and reactive to light.      Comments: Pupils are pinpoint bilaterally   Cardiovascular:      Rate and Rhythm: Normal rate and regular rhythm.      Pulses: Normal pulses.      Heart sounds: Normal heart sounds. No murmur heard.  Pulmonary:      Effort: Pulmonary effort is normal. No respiratory distress.      Breath sounds: Normal breath sounds. No wheezing or rales.   Abdominal:      General: Bowel sounds are normal.      Palpations: Abdomen is soft.      Tenderness: There is no abdominal tenderness. There is no guarding or rebound.   Musculoskeletal:         General: Normal range of motion.      Cervical back: Normal range of motion and neck supple.   Skin:     General: Skin is warm and dry.      Capillary Refill: Capillary refill takes less than 2 seconds.      Coloration: Skin is not pale.      Findings: No bruising.   Neurological: